# Patient Record
Sex: MALE | Race: WHITE | Employment: FULL TIME | URBAN - METROPOLITAN AREA
[De-identification: names, ages, dates, MRNs, and addresses within clinical notes are randomized per-mention and may not be internally consistent; named-entity substitution may affect disease eponyms.]

---

## 2017-04-20 ENCOUNTER — HOSPITAL ENCOUNTER (OUTPATIENT)
Age: 67
Discharge: HOME | End: 2017-04-20
Payer: MEDICARE

## 2017-04-20 LAB — PROSTATE SPECIFIC ANTIGEN: 1.63 UG/L

## 2019-01-10 DIAGNOSIS — M25.561 RIGHT KNEE PAIN, UNSPECIFIED CHRONICITY: Primary | ICD-10-CM

## 2019-01-11 ENCOUNTER — TELEPHONE (OUTPATIENT)
Dept: ORTHOPEDIC SURGERY | Age: 69
End: 2019-01-11

## 2019-01-14 ENCOUNTER — OFFICE VISIT (OUTPATIENT)
Dept: ORTHOPEDIC SURGERY | Age: 69
End: 2019-01-14
Payer: MEDICARE

## 2019-01-14 VITALS
RESPIRATION RATE: 20 BRPM | SYSTOLIC BLOOD PRESSURE: 141 MMHG | HEIGHT: 70 IN | WEIGHT: 225 LBS | DIASTOLIC BLOOD PRESSURE: 79 MMHG | BODY MASS INDEX: 32.21 KG/M2 | HEART RATE: 67 BPM

## 2019-01-14 DIAGNOSIS — M75.101 ROTATOR CUFF SYNDROME OF RIGHT SHOULDER: ICD-10-CM

## 2019-01-14 DIAGNOSIS — M17.0 BILATERAL PRIMARY OSTEOARTHRITIS OF KNEE: Primary | ICD-10-CM

## 2019-01-14 DIAGNOSIS — M25.561 PAIN IN BOTH KNEES, UNSPECIFIED CHRONICITY: ICD-10-CM

## 2019-01-14 DIAGNOSIS — M25.562 PAIN IN BOTH KNEES, UNSPECIFIED CHRONICITY: ICD-10-CM

## 2019-01-14 PROCEDURE — 4040F PNEUMOC VAC/ADMIN/RCVD: CPT | Performed by: ORTHOPAEDIC SURGERY

## 2019-01-14 PROCEDURE — 1123F ACP DISCUSS/DSCN MKR DOCD: CPT | Performed by: ORTHOPAEDIC SURGERY

## 2019-01-14 PROCEDURE — 1101F PT FALLS ASSESS-DOCD LE1/YR: CPT | Performed by: ORTHOPAEDIC SURGERY

## 2019-01-14 PROCEDURE — G8427 DOCREV CUR MEDS BY ELIG CLIN: HCPCS | Performed by: ORTHOPAEDIC SURGERY

## 2019-01-14 PROCEDURE — G8417 CALC BMI ABV UP PARAM F/U: HCPCS | Performed by: ORTHOPAEDIC SURGERY

## 2019-01-14 PROCEDURE — 1036F TOBACCO NON-USER: CPT | Performed by: ORTHOPAEDIC SURGERY

## 2019-01-14 PROCEDURE — G8484 FLU IMMUNIZE NO ADMIN: HCPCS | Performed by: ORTHOPAEDIC SURGERY

## 2019-01-14 PROCEDURE — 3017F COLORECTAL CA SCREEN DOC REV: CPT | Performed by: ORTHOPAEDIC SURGERY

## 2019-01-14 PROCEDURE — 99214 OFFICE O/P EST MOD 30 MIN: CPT | Performed by: ORTHOPAEDIC SURGERY

## 2019-01-14 ASSESSMENT — ENCOUNTER SYMPTOMS
SHORTNESS OF BREATH: 0
COLOR CHANGE: 0
DIARRHEA: 0
ABDOMINAL DISTENTION: 0
VOMITING: 0
CONSTIPATION: 0
ABDOMINAL PAIN: 0
NAUSEA: 0
CHEST TIGHTNESS: 0
COUGH: 0
APNEA: 0

## 2019-10-01 ENCOUNTER — OFFICE VISIT (OUTPATIENT)
Dept: UROLOGY | Age: 69
End: 2019-10-01
Payer: MEDICARE

## 2019-10-01 VITALS
HEIGHT: 70 IN | DIASTOLIC BLOOD PRESSURE: 60 MMHG | WEIGHT: 212.8 LBS | SYSTOLIC BLOOD PRESSURE: 110 MMHG | BODY MASS INDEX: 30.46 KG/M2 | TEMPERATURE: 98.1 F

## 2019-10-01 DIAGNOSIS — R10.9 FLANK PAIN: ICD-10-CM

## 2019-10-01 DIAGNOSIS — N20.1 URETERAL STONE: Primary | ICD-10-CM

## 2019-10-01 PROCEDURE — 1036F TOBACCO NON-USER: CPT | Performed by: UROLOGY

## 2019-10-01 PROCEDURE — G8427 DOCREV CUR MEDS BY ELIG CLIN: HCPCS | Performed by: UROLOGY

## 2019-10-01 PROCEDURE — 1123F ACP DISCUSS/DSCN MKR DOCD: CPT | Performed by: UROLOGY

## 2019-10-01 PROCEDURE — G8417 CALC BMI ABV UP PARAM F/U: HCPCS | Performed by: UROLOGY

## 2019-10-01 PROCEDURE — 99214 OFFICE O/P EST MOD 30 MIN: CPT | Performed by: UROLOGY

## 2019-10-01 PROCEDURE — 4040F PNEUMOC VAC/ADMIN/RCVD: CPT | Performed by: UROLOGY

## 2019-10-01 PROCEDURE — 3017F COLORECTAL CA SCREEN DOC REV: CPT | Performed by: UROLOGY

## 2019-10-01 PROCEDURE — G8484 FLU IMMUNIZE NO ADMIN: HCPCS | Performed by: UROLOGY

## 2019-10-01 RX ORDER — IBUPROFEN 800 MG/1
800 TABLET ORAL EVERY 6 HOURS PRN
COMMUNITY
End: 2019-12-26 | Stop reason: SDUPTHER

## 2019-10-01 RX ORDER — COVID-19 ANTIGEN TEST
KIT MISCELLANEOUS
COMMUNITY
End: 2019-10-22 | Stop reason: ALTCHOICE

## 2019-10-01 ASSESSMENT — ENCOUNTER SYMPTOMS
VOMITING: 0
COUGH: 0
EYE REDNESS: 0
BACK PAIN: 1
EYE PAIN: 0
ABDOMINAL PAIN: 0
SHORTNESS OF BREATH: 0
WHEEZING: 0
NAUSEA: 0
COLOR CHANGE: 0

## 2019-10-18 ENCOUNTER — HOSPITAL ENCOUNTER (OUTPATIENT)
Age: 69
Discharge: HOME | End: 2019-10-20
Payer: MEDICARE

## 2019-10-18 ENCOUNTER — HOSPITAL ENCOUNTER (OUTPATIENT)
Dept: GENERAL RADIOLOGY | Age: 69
Discharge: HOME | End: 2019-10-20
Payer: MEDICARE

## 2019-10-18 DIAGNOSIS — N20.1 URETERAL STONE: ICD-10-CM

## 2019-10-18 PROCEDURE — 74018 RADEX ABDOMEN 1 VIEW: CPT

## 2019-10-22 ENCOUNTER — OFFICE VISIT (OUTPATIENT)
Dept: UROLOGY | Age: 69
End: 2019-10-22
Payer: MEDICARE

## 2019-10-22 ENCOUNTER — TELEPHONE (OUTPATIENT)
Dept: UROLOGY | Age: 69
End: 2019-10-22

## 2019-10-22 VITALS
DIASTOLIC BLOOD PRESSURE: 70 MMHG | WEIGHT: 217 LBS | SYSTOLIC BLOOD PRESSURE: 120 MMHG | TEMPERATURE: 98 F | HEIGHT: 70 IN | BODY MASS INDEX: 31.07 KG/M2 | HEART RATE: 58 BPM

## 2019-10-22 DIAGNOSIS — N20.1 URETERAL STONE: ICD-10-CM

## 2019-10-22 DIAGNOSIS — N20.0 KIDNEY STONE: Primary | ICD-10-CM

## 2019-10-22 PROCEDURE — 4040F PNEUMOC VAC/ADMIN/RCVD: CPT | Performed by: UROLOGY

## 2019-10-22 PROCEDURE — G8417 CALC BMI ABV UP PARAM F/U: HCPCS | Performed by: UROLOGY

## 2019-10-22 PROCEDURE — 3017F COLORECTAL CA SCREEN DOC REV: CPT | Performed by: UROLOGY

## 2019-10-22 PROCEDURE — 1036F TOBACCO NON-USER: CPT | Performed by: UROLOGY

## 2019-10-22 PROCEDURE — G8484 FLU IMMUNIZE NO ADMIN: HCPCS | Performed by: UROLOGY

## 2019-10-22 PROCEDURE — 99214 OFFICE O/P EST MOD 30 MIN: CPT | Performed by: UROLOGY

## 2019-10-22 PROCEDURE — G8427 DOCREV CUR MEDS BY ELIG CLIN: HCPCS | Performed by: UROLOGY

## 2019-10-22 PROCEDURE — 1123F ACP DISCUSS/DSCN MKR DOCD: CPT | Performed by: UROLOGY

## 2019-10-22 RX ORDER — TRAMADOL HYDROCHLORIDE 50 MG/1
50 TABLET ORAL EVERY 8 HOURS PRN
Refills: 1 | COMMUNITY
Start: 2019-10-05 | End: 2022-02-15

## 2019-10-22 RX ORDER — CELECOXIB 200 MG/1
200 CAPSULE ORAL DAILY
Refills: 2 | COMMUNITY
Start: 2019-10-13 | End: 2020-03-10

## 2019-10-22 ASSESSMENT — ENCOUNTER SYMPTOMS
COUGH: 0
COLOR CHANGE: 0
VOMITING: 0
BACK PAIN: 0
EYE PAIN: 0
NAUSEA: 0
SHORTNESS OF BREATH: 0
ABDOMINAL PAIN: 0
EYE REDNESS: 0
WHEEZING: 0

## 2019-10-25 ENCOUNTER — HOSPITAL ENCOUNTER (OUTPATIENT)
Dept: PREADMISSION TESTING | Age: 69
Discharge: HOME | End: 2019-10-29
Payer: MEDICARE

## 2019-10-25 VITALS
DIASTOLIC BLOOD PRESSURE: 78 MMHG | OXYGEN SATURATION: 97 % | RESPIRATION RATE: 16 BRPM | TEMPERATURE: 97.9 F | HEART RATE: 56 BPM | HEIGHT: 70 IN | BODY MASS INDEX: 31.07 KG/M2 | WEIGHT: 217 LBS | SYSTOLIC BLOOD PRESSURE: 143 MMHG

## 2019-10-25 LAB
ABSOLUTE EOS #: 0 K/UL (ref 0–0.4)
ABSOLUTE IMMATURE GRANULOCYTE: ABNORMAL K/UL (ref 0–0.3)
ABSOLUTE LYMPH #: 21.41 K/UL (ref 1–4.8)
ABSOLUTE MONO #: 0.78 K/UL (ref 0.1–1.3)
ANION GAP SERPL CALCULATED.3IONS-SCNC: 13 MMOL/L (ref 9–17)
ATYPICAL LYMPHOCYTE ABSOLUTE COUNT: 0.52 K/UL
ATYPICAL LYMPHOCYTES: 2 %
BASOPHILS # BLD: 0 % (ref 0–2)
BASOPHILS ABSOLUTE: 0 K/UL (ref 0–0.2)
BUN BLDV-MCNC: 16 MG/DL (ref 8–23)
BUN/CREAT BLD: ABNORMAL (ref 9–20)
CALCIUM SERPL-MCNC: 9.4 MG/DL (ref 8.6–10.4)
CHLORIDE BLD-SCNC: 104 MMOL/L (ref 98–107)
CO2: 23 MMOL/L (ref 20–31)
CREAT SERPL-MCNC: 0.6 MG/DL (ref 0.7–1.2)
DIFFERENTIAL TYPE: ABNORMAL
EOSINOPHILS RELATIVE PERCENT: 0 % (ref 0–4)
GFR AFRICAN AMERICAN: >60 ML/MIN
GFR NON-AFRICAN AMERICAN: >60 ML/MIN
GFR SERPL CREATININE-BSD FRML MDRD: ABNORMAL ML/MIN/{1.73_M2}
GFR SERPL CREATININE-BSD FRML MDRD: ABNORMAL ML/MIN/{1.73_M2}
GLUCOSE BLD-MCNC: 89 MG/DL (ref 70–99)
HCT VFR BLD CALC: 41.9 % (ref 41–53)
HEMOGLOBIN: 13.8 G/DL (ref 13.5–17.5)
IMMATURE GRANULOCYTES: ABNORMAL %
LYMPHOCYTES # BLD: 82 % (ref 24–44)
MCH RBC QN AUTO: 30.8 PG (ref 26–34)
MCHC RBC AUTO-ENTMCNC: 33 G/DL (ref 31–37)
MCV RBC AUTO: 93.4 FL (ref 80–100)
MONOCYTES # BLD: 3 % (ref 1–7)
MORPHOLOGY: NORMAL
NRBC AUTOMATED: ABNORMAL PER 100 WBC
PDW BLD-RTO: 14 % (ref 11.5–14.9)
PLATELET # BLD: 170 K/UL (ref 150–450)
PLATELET ESTIMATE: ABNORMAL
PMV BLD AUTO: 9.2 FL (ref 6–12)
POTASSIUM SERPL-SCNC: 4.3 MMOL/L (ref 3.7–5.3)
RBC # BLD: 4.49 M/UL (ref 4.5–5.9)
RBC # BLD: ABNORMAL 10*6/UL
SEG NEUTROPHILS: 13 % (ref 36–66)
SEGMENTED NEUTROPHILS ABSOLUTE COUNT: 3.39 K/UL (ref 1.3–9.1)
SODIUM BLD-SCNC: 140 MMOL/L (ref 135–144)
WBC # BLD: 26.1 K/UL (ref 3.5–11)
WBC # BLD: ABNORMAL 10*3/UL

## 2019-10-25 PROCEDURE — 85025 COMPLETE CBC W/AUTO DIFF WBC: CPT

## 2019-10-25 PROCEDURE — 80048 BASIC METABOLIC PNL TOTAL CA: CPT

## 2019-10-25 PROCEDURE — 87086 URINE CULTURE/COLONY COUNT: CPT

## 2019-10-25 PROCEDURE — 93005 ELECTROCARDIOGRAM TRACING: CPT | Performed by: ANESTHESIOLOGY

## 2019-10-25 PROCEDURE — 36415 COLL VENOUS BLD VENIPUNCTURE: CPT

## 2019-10-25 RX ORDER — IRBESARTAN 150 MG/1
150 TABLET ORAL DAILY
COMMUNITY
End: 2022-08-22 | Stop reason: SDUPTHER

## 2019-10-26 LAB
CULTURE: NORMAL
EKG ATRIAL RATE: 55 BPM
EKG P AXIS: 34 DEGREES
EKG P-R INTERVAL: 116 MS
EKG Q-T INTERVAL: 440 MS
EKG QRS DURATION: 86 MS
EKG QTC CALCULATION (BAZETT): 420 MS
EKG R AXIS: 19 DEGREES
EKG T AXIS: 27 DEGREES
EKG VENTRICULAR RATE: 55 BPM
Lab: NORMAL
SPECIMEN DESCRIPTION: NORMAL

## 2019-10-26 PROCEDURE — 93010 ELECTROCARDIOGRAM REPORT: CPT | Performed by: INTERNAL MEDICINE

## 2019-10-28 LAB — PATHOLOGIST REVIEW: NORMAL

## 2019-11-20 ENCOUNTER — TELEPHONE (OUTPATIENT)
Dept: UROLOGY | Age: 69
End: 2019-11-20

## 2019-11-20 DIAGNOSIS — N20.0 KIDNEY STONE: Primary | ICD-10-CM

## 2019-12-03 ENCOUNTER — HOSPITAL ENCOUNTER (OUTPATIENT)
Dept: GENERAL RADIOLOGY | Age: 69
Discharge: HOME | End: 2019-12-05
Payer: MEDICARE

## 2019-12-03 ENCOUNTER — HOSPITAL ENCOUNTER (OUTPATIENT)
Age: 69
Discharge: HOME | End: 2019-12-05
Payer: MEDICARE

## 2019-12-03 DIAGNOSIS — N20.1 URETERAL STONE: ICD-10-CM

## 2019-12-03 PROCEDURE — 74018 RADEX ABDOMEN 1 VIEW: CPT

## 2019-12-10 ENCOUNTER — TELEPHONE (OUTPATIENT)
Dept: UROLOGY | Age: 69
End: 2019-12-10

## 2019-12-10 DIAGNOSIS — N20.0 KIDNEY STONE: Primary | ICD-10-CM

## 2019-12-23 ENCOUNTER — TELEPHONE (OUTPATIENT)
Dept: UROLOGY | Age: 69
End: 2019-12-23

## 2019-12-23 DIAGNOSIS — R10.9 FLANK PAIN: Primary | ICD-10-CM

## 2019-12-26 ENCOUNTER — OFFICE VISIT (OUTPATIENT)
Dept: UROLOGY | Age: 69
End: 2019-12-26

## 2019-12-26 VITALS
HEIGHT: 70 IN | SYSTOLIC BLOOD PRESSURE: 131 MMHG | DIASTOLIC BLOOD PRESSURE: 81 MMHG | BODY MASS INDEX: 31.14 KG/M2 | HEART RATE: 58 BPM

## 2019-12-26 DIAGNOSIS — R10.9 FLANK PAIN: ICD-10-CM

## 2019-12-26 DIAGNOSIS — N20.0 KIDNEY STONE: Primary | ICD-10-CM

## 2019-12-26 PROCEDURE — 99024 POSTOP FOLLOW-UP VISIT: CPT | Performed by: NURSE PRACTITIONER

## 2019-12-26 RX ORDER — IBUPROFEN 800 MG/1
800 TABLET ORAL EVERY 12 HOURS PRN
Qty: 14 TABLET | Refills: 0 | Status: SHIPPED | OUTPATIENT
Start: 2019-12-26 | End: 2022-02-15

## 2019-12-26 ASSESSMENT — ENCOUNTER SYMPTOMS
EYE REDNESS: 0
DIARRHEA: 0
COUGH: 0
VOMITING: 0
WHEEZING: 0
ABDOMINAL PAIN: 0
CONSTIPATION: 0
SHORTNESS OF BREATH: 0
EYE PAIN: 0
NAUSEA: 0
BACK PAIN: 0

## 2019-12-31 ENCOUNTER — HOSPITAL ENCOUNTER (OUTPATIENT)
Age: 69
Setting detail: SPECIMEN
Discharge: HOME | End: 2019-12-31
Payer: MEDICARE

## 2019-12-31 ENCOUNTER — OFFICE VISIT (OUTPATIENT)
Dept: UROLOGY | Age: 69
End: 2019-12-31

## 2019-12-31 VITALS
HEART RATE: 56 BPM | HEIGHT: 70 IN | SYSTOLIC BLOOD PRESSURE: 131 MMHG | WEIGHT: 216.93 LBS | DIASTOLIC BLOOD PRESSURE: 87 MMHG | BODY MASS INDEX: 31.06 KG/M2

## 2019-12-31 DIAGNOSIS — N20.0 KIDNEY STONES: Primary | ICD-10-CM

## 2019-12-31 PROCEDURE — 99024 POSTOP FOLLOW-UP VISIT: CPT | Performed by: UROLOGY

## 2020-01-03 LAB
STONE COMPOSITION: NORMAL
STONE DESCRIPTION: NORMAL
STONE MASS: 212 MG
STONE NUMBER: NORMAL
STONE SIZE: NORMAL MM

## 2020-02-26 ENCOUNTER — TELEPHONE (OUTPATIENT)
Dept: UROLOGY | Age: 70
End: 2020-02-26

## 2020-02-26 NOTE — TELEPHONE ENCOUNTER
Called Bo spoke to [place] who informed me results can take 4-6 business days to be finalized. Pt appointment will need to be rescheduled. Called pt left a message appointment being changed to 3/10/2020 at 9:40AM. Pt called to confirm new appointment date.

## 2020-03-10 ENCOUNTER — OFFICE VISIT (OUTPATIENT)
Dept: UROLOGY | Age: 70
End: 2020-03-10
Payer: MEDICARE

## 2020-03-10 VITALS
DIASTOLIC BLOOD PRESSURE: 64 MMHG | SYSTOLIC BLOOD PRESSURE: 118 MMHG | BODY MASS INDEX: 32.07 KG/M2 | HEIGHT: 70 IN | TEMPERATURE: 98 F | WEIGHT: 224 LBS

## 2020-03-10 PROCEDURE — 4040F PNEUMOC VAC/ADMIN/RCVD: CPT | Performed by: UROLOGY

## 2020-03-10 PROCEDURE — 3017F COLORECTAL CA SCREEN DOC REV: CPT | Performed by: UROLOGY

## 2020-03-10 PROCEDURE — G8427 DOCREV CUR MEDS BY ELIG CLIN: HCPCS | Performed by: UROLOGY

## 2020-03-10 PROCEDURE — 99213 OFFICE O/P EST LOW 20 MIN: CPT | Performed by: UROLOGY

## 2020-03-10 PROCEDURE — G8484 FLU IMMUNIZE NO ADMIN: HCPCS | Performed by: UROLOGY

## 2020-03-10 PROCEDURE — 1036F TOBACCO NON-USER: CPT | Performed by: UROLOGY

## 2020-03-10 PROCEDURE — G8417 CALC BMI ABV UP PARAM F/U: HCPCS | Performed by: UROLOGY

## 2020-03-10 PROCEDURE — 1123F ACP DISCUSS/DSCN MKR DOCD: CPT | Performed by: UROLOGY

## 2020-03-10 RX ORDER — GABAPENTIN 100 MG/1
100 CAPSULE ORAL 3 TIMES DAILY
COMMUNITY
End: 2022-08-25

## 2020-03-10 ASSESSMENT — ENCOUNTER SYMPTOMS
BACK PAIN: 0
NAUSEA: 0
VOMITING: 0
ABDOMINAL PAIN: 0
COUGH: 0
COLOR CHANGE: 0
EYE PAIN: 0
EYE REDNESS: 0
WHEEZING: 0
SHORTNESS OF BREATH: 0

## 2020-06-15 ENCOUNTER — TELEPHONE (OUTPATIENT)
Dept: UROLOGY | Age: 70
End: 2020-06-15

## 2020-06-15 ENCOUNTER — HOSPITAL ENCOUNTER (OUTPATIENT)
Dept: GENERAL RADIOLOGY | Age: 70
Discharge: HOME | End: 2020-06-17
Payer: MEDICARE

## 2020-06-15 ENCOUNTER — HOSPITAL ENCOUNTER (OUTPATIENT)
Age: 70
Discharge: HOME | End: 2020-06-17
Payer: MEDICARE

## 2020-06-15 PROCEDURE — 74018 RADEX ABDOMEN 1 VIEW: CPT

## 2020-06-23 ENCOUNTER — OFFICE VISIT (OUTPATIENT)
Dept: UROLOGY | Age: 70
End: 2020-06-23
Payer: MEDICARE

## 2020-06-23 VITALS — TEMPERATURE: 98.1 F

## 2020-06-23 PROCEDURE — G8427 DOCREV CUR MEDS BY ELIG CLIN: HCPCS | Performed by: UROLOGY

## 2020-06-23 PROCEDURE — G8417 CALC BMI ABV UP PARAM F/U: HCPCS | Performed by: UROLOGY

## 2020-06-23 PROCEDURE — 1123F ACP DISCUSS/DSCN MKR DOCD: CPT | Performed by: UROLOGY

## 2020-06-23 PROCEDURE — 3017F COLORECTAL CA SCREEN DOC REV: CPT | Performed by: UROLOGY

## 2020-06-23 PROCEDURE — 99214 OFFICE O/P EST MOD 30 MIN: CPT | Performed by: UROLOGY

## 2020-06-23 PROCEDURE — 4040F PNEUMOC VAC/ADMIN/RCVD: CPT | Performed by: UROLOGY

## 2020-06-23 PROCEDURE — 1036F TOBACCO NON-USER: CPT | Performed by: UROLOGY

## 2020-06-23 ASSESSMENT — ENCOUNTER SYMPTOMS
VOMITING: 0
BACK PAIN: 1
EYE REDNESS: 0
WHEEZING: 0
EYE PAIN: 0
COUGH: 0
NAUSEA: 0
SHORTNESS OF BREATH: 0
ABDOMINAL PAIN: 0
COLOR CHANGE: 0

## 2020-06-23 NOTE — PROGRESS NOTES
[facility]  [facility] UROLOGY SPECIALISTS - [address] [address]  Dept: [address] [facility] Urology Office Note - Established    Patient:  Srinath Fisher  YOB: 1950  Date: 6/23/2020    The patient is a 71 y.o. male who presents todayfor evaluation of the following problems:   Chief Complaint   Patient presents with    Nephrolithiasis     3 mo f/u with KUB        HPI  He is here in follow up for kidney stones. He has some mild right flank pain, but he does a lot of lifting in caring for his mother. He had an ESWL for a right ureteral stone, which went well. He had a KUB, which shows stable non obstructing stones on each side. The pain in his back does not radiate. Summary of old records: N/A    Additional History: N/A    Procedures Today: N/A    Urinalysis today:  No results found for this visit on 06/23/20. Last several PSA's:  Lab Results   Component Value Date    PSA 1.63 04/20/2017    PSA 1.90 03/15/2012     Last total testosterone:  No results found for: TESTOSTERONE    AUA Symptom Score (6/23/2020):                               Last BUN and creatinine:  Lab Results   Component Value Date    BUN 16 10/25/2019     Lab Results   Component Value Date    CREATININE 0.60 (L) 10/25/2019       Additional Lab/Culture results: stone was calcium oxalate. Imaging Reviewed during this Office Visit: KUB images show stone in the kidneys, which are stable.    (results were independently reviewed by physician and radiology report verified)    PAST MEDICAL, FAMILY AND SOCIAL HISTORY UPDATE:  Past Medical History:   Diagnosis Date    Arthritis     right shoulder, knee    BPH (benign prostatic hyperplasia)     Caffeine use     2 cups coffee/day    Chronic back pain     Chronic lymphocytic leukemia (HCC)     numbers are stable    Hyperlipidemia     Hypertension     Hypoglycemia     Kidney stones     Ocular myasthenia gravis ([facility] [address]     Sciatica is no height or weight on file to calculate BMI. Patient is a 71 y.o. male in no acute distress and alert and oriented to person, place and time. Physical Exam  Constitutional: Patient in no acute distress. Neuro: Alert and oriented to person, place and time. Psych: Mood normal, affect normal  Skin: No rash noted  Lungs: Respiratory effort is normal  Cardiovascular: Warm & Pink  Abdomen: Soft, non-tender, non-distended with no CVA,  No flank tenderness,  Or hepatosplenomegaly   Lymphatics: No palpablelymphadenopathy. Bladder non-tender and not distended. Musculoskeletal: Normal gait and station      Assessment and Plan      1. Kidney stone    2. Flank pain    3. Prostate cancer screening           Plan:          He is doing well  Discussed stone prevention  He said he had a normal PSA. Return in about 1 year (around 6/23/2021) for Labs. Prescriptions Ordered:  No orders of the defined types were placed in this encounter. Orders Placed:  Orders Placed This Encounter   Procedures    XR ABDOMEN (KUB) (SINGLE AP VIEW)     Standing Status:   Future     Standing Expiration Date:   6/23/2021    PSA Screening     Standing Status:   Future     Standing Expiration Date:   6/23/2021           Sherrill Church MD    Agree with the ROS entered by the MA.

## 2020-06-23 NOTE — PROGRESS NOTES
Review of Systems   Constitutional: Negative for activity change, chills and fever. Eyes: Negative for pain, redness and visual disturbance. Respiratory: Negative for cough, shortness of breath and wheezing. Cardiovascular: Negative for chest pain and leg swelling. Gastrointestinal: Negative for abdominal pain, nausea and vomiting. Genitourinary: Negative for difficulty urinating, discharge, dysuria, flank pain, frequency, hematuria, scrotal swelling and testicular pain. Musculoskeletal: Positive for back pain and myalgias. Negative for joint swelling. Skin: Negative for color change and rash. Neurological: Negative for dizziness, tremors and numbness. Hematological: Negative for adenopathy. Does not bruise/bleed easily.

## 2021-07-12 DIAGNOSIS — N20.0 KIDNEY STONES: ICD-10-CM

## 2021-07-12 DIAGNOSIS — Z12.5 PROSTATE CANCER SCREENING: Primary | ICD-10-CM

## 2021-07-14 ENCOUNTER — HOSPITAL ENCOUNTER (OUTPATIENT)
Age: 71
Discharge: HOME | End: 2021-07-16
Payer: MEDICARE

## 2021-07-14 ENCOUNTER — HOSPITAL ENCOUNTER (OUTPATIENT)
Age: 71
Discharge: HOME | End: 2021-07-14
Payer: MEDICARE

## 2021-07-14 ENCOUNTER — HOSPITAL ENCOUNTER (OUTPATIENT)
Dept: GENERAL RADIOLOGY | Age: 71
Discharge: HOME | End: 2021-07-16
Payer: MEDICARE

## 2021-07-14 DIAGNOSIS — N20.0 KIDNEY STONES: ICD-10-CM

## 2021-07-14 DIAGNOSIS — Z12.5 PROSTATE CANCER SCREENING: ICD-10-CM

## 2021-07-14 LAB — PROSTATE SPECIFIC ANTIGEN: 2.45 UG/L

## 2021-07-14 PROCEDURE — 36415 COLL VENOUS BLD VENIPUNCTURE: CPT

## 2021-07-14 PROCEDURE — 74018 RADEX ABDOMEN 1 VIEW: CPT

## 2021-07-14 PROCEDURE — G0103 PSA SCREENING: HCPCS

## 2021-07-16 ENCOUNTER — OFFICE VISIT (OUTPATIENT)
Dept: UROLOGY | Age: 71
End: 2021-07-16
Payer: MEDICARE

## 2021-07-16 VITALS
TEMPERATURE: 97 F | SYSTOLIC BLOOD PRESSURE: 117 MMHG | HEIGHT: 70 IN | WEIGHT: 186 LBS | HEART RATE: 57 BPM | DIASTOLIC BLOOD PRESSURE: 66 MMHG | BODY MASS INDEX: 26.63 KG/M2

## 2021-07-16 DIAGNOSIS — N40.1 BPH WITH OBSTRUCTION/LOWER URINARY TRACT SYMPTOMS: ICD-10-CM

## 2021-07-16 DIAGNOSIS — N20.0 KIDNEY STONES: Primary | ICD-10-CM

## 2021-07-16 DIAGNOSIS — Z12.5 PROSTATE CANCER SCREENING: ICD-10-CM

## 2021-07-16 DIAGNOSIS — N13.8 BPH WITH OBSTRUCTION/LOWER URINARY TRACT SYMPTOMS: ICD-10-CM

## 2021-07-16 PROCEDURE — 1123F ACP DISCUSS/DSCN MKR DOCD: CPT | Performed by: UROLOGY

## 2021-07-16 PROCEDURE — 3017F COLORECTAL CA SCREEN DOC REV: CPT | Performed by: UROLOGY

## 2021-07-16 PROCEDURE — G8417 CALC BMI ABV UP PARAM F/U: HCPCS | Performed by: UROLOGY

## 2021-07-16 PROCEDURE — 1036F TOBACCO NON-USER: CPT | Performed by: UROLOGY

## 2021-07-16 PROCEDURE — G8427 DOCREV CUR MEDS BY ELIG CLIN: HCPCS | Performed by: UROLOGY

## 2021-07-16 PROCEDURE — 99214 OFFICE O/P EST MOD 30 MIN: CPT | Performed by: UROLOGY

## 2021-07-16 PROCEDURE — 4040F PNEUMOC VAC/ADMIN/RCVD: CPT | Performed by: UROLOGY

## 2021-07-16 RX ORDER — TAMSULOSIN HYDROCHLORIDE 0.4 MG/1
0.4 CAPSULE ORAL NIGHTLY
Qty: 90 CAPSULE | Refills: 3 | Status: SHIPPED | OUTPATIENT
Start: 2021-07-16 | End: 2022-08-25

## 2021-07-16 RX ORDER — DUTASTERIDE 0.5 MG/1
0.5 CAPSULE, LIQUID FILLED ORAL DAILY
Qty: 90 CAPSULE | Refills: 3 | Status: SHIPPED | OUTPATIENT
Start: 2021-07-16 | End: 2022-07-17

## 2021-07-16 ASSESSMENT — ENCOUNTER SYMPTOMS
SHORTNESS OF BREATH: 0
EYE REDNESS: 0
COLOR CHANGE: 0
EYE PAIN: 0
WHEEZING: 0
ABDOMINAL PAIN: 0
COUGH: 0
NAUSEA: 0
VOMITING: 0
BACK PAIN: 0

## 2021-07-16 NOTE — PROGRESS NOTES
[address] [address]  Dept: [address] [facility] Urology Office Note - Established    Patient:  Keli Orosco  YOB: 1950  Date: 7/16/2021    The patient is a 79 y.o. male who presents todayfor evaluation of the following problems:   Chief Complaint   Patient presents with    1 Year Follow Up       HPI  He is here in follow up for stones. He ha not had any recent pain in his kidneys. He has cut back on salt. He drinks a lot for water. No hematuria or dysuria. PSA is normal. He is on Flomax, which is working well for him. He has a good stream and empties well. He is on Avodart as well. Summary of old records: N/A    Additional History: N/A    Procedures Today: N/A    Urinalysis today:  No results found for this visit on 07/16/21. Last several PSA's:  Lab Results   Component Value Date    PSA 2.45 07/14/2021    PSA 1.63 04/20/2017    PSA 1.90 03/15/2012     Last total testosterone:  No results found for: TESTOSTERONE    AUA Symptom Score (7/16/2021):   INCOMPLETE EMPTYING: How often have you had the sensation of not emptying your bladder?: Not at all  FREQUENCY: How often do you have to urinate less than every two hours?: Not at all  INTERMITTENCY: How often have you found you stopped and started again several times when you urinated?: Not at all  URGENCY: How often have you found it difficult to postpone urination?: Not at all  WEAK STREAM: How often have you had a weak urinary stream?: Not at all  STRAINING: How often have you had to strain to start  urination?: Not at all  NOCTURIA: How many times did you typically get up at night to uriniate?: 2 Times  TOTAL I-PSS SCORE[de-identified] 2  How would you feel if you were to spend the rest of your life with your urinary condition?: Delighted    Last BUN and creatinine:  Lab Results   Component Value Date    BUN 16 10/25/2019     Lab Results   Component Value Date CREATININE 0.60 (L) 10/25/2019       Additional Lab/Culture results: none    Imaging Reviewed during this Office Visit: KUB images reviewed, stable left renal stones noted. (results were independently reviewed by physician and radiology report verified)    PAST MEDICAL, FAMILY AND SOCIAL HISTORY UPDATE:  Past Medical History:   Diagnosis Date    Arthritis     right shoulder, knee    BPH (benign prostatic hyperplasia)     Caffeine use     2 cups coffee/day    Chronic back pain     Chronic lymphocytic leukemia (HCC)     numbers are stable    Hyperlipidemia     Hypertension     Hypoglycemia     Kidney stones     Ocular myasthenia gravis ([address]     Sciatica     lower back    Vitamin D deficiency     Wears glasses      Past Surgical History:   Procedure Laterality Date    COLONOSCOPY      2 times    KNEE ARTHROSCOPY Bilateral     meniscus    LITHOTRIPSY      3 times    TONSILLECTOMY AND ADENOIDECTOMY  1959     Family History   Problem Relation Age of Onset    Glaucoma Mother     Other Mother         Wyonia Saltness Dementia Father     Prostate Cancer Brother     Heart Attack Maternal Uncle      Outpatient Medications Marked as Taking for the 7/16/21 encounter (Office Visit) with Jennifer Vu MD   Medication Sig Dispense Refill    tamsulosin (FLOMAX) 0.4 MG capsule Take 1 capsule by mouth nightly 90 capsule 3    dutasteride (AVODART) 0.5 MG capsule Take 1 capsule by mouth daily 90 capsule 3    gabapentin (NEURONTIN) 100 MG capsule Take 100 mg by mouth 3 times daily.  ibuprofen (ADVIL;MOTRIN) 800 MG tablet Take 1 tablet by mouth every 12 hours as needed for Pain 14 tablet 0    irbesartan (AVAPRO) 150 MG tablet Take 150 mg by mouth daily      traMADol (ULTRAM) 50 MG tablet Take 50 mg by mouth every 8 hours as needed.    1    Cholecalciferol 2000 units TABS Take 2,000 Units by mouth daily D3      desloratadine-pseudoephedrine (CLARINEX-D 24 HOUR) 5-240 MG per tablet Take 1 tablet by mouth daily as needed       Glucosamine HCl--250 MG TABS Take 2 tablets by mouth daily Move Free      pyridostigmine (MESTINON) 60 MG tablet Take 60 mg by mouth 4 times daily       simvastatin (ZOCOR) 20 MG tablet Take 20 mg by mouth nightly. Ciprofloxacin  Social History     Tobacco Use   Smoking Status Never Smoker   Smokeless Tobacco Never Used   Tobacco Comment    smoked a pipe about 1 year in his 19's     (Ifpatient a smoker, smoking cessation counseling offered)    Social History     Substance and Sexual Activity   Alcohol Use Yes    Comment: 2-3 times a week       REVIEW OF SYSTEMS:  Review of Systems    Physical Exam:      Vitals:    07/16/21 1134   BP: 117/66   Pulse: 57   Temp: 97 °F (36.1 °C)     Body mass index is 26.69 kg/m². Patient is a 79 y.o. male in no acute distress and alert and oriented to person, place and time. Physical Exam  Constitutional: Patient in no acute distress. Neuro: Alert and oriented to person, place and time. Psych: Mood normal, affect normal  Lungs: Respiratory effort is normal  Cardiovascular: Warm & Pink  Abdomen: Soft, non-tender, non-distended with no CVA,  No flank tenderness,  Or hepatosplenomegaly   Lymphatics: No palpablelymphadenopathy. Bladder non-tender and not distended. Musculoskeletal: Normal gait and station  RICO normal.     Assessment and Plan      1. Kidney stones    2. Prostate cancer screening    3. BPH with obstruction/lower urinary tract symptoms           Plan:          He is doing well. Continue Flomax and Avodart  Stones are stable. F/U in 1 year with a PSA and RICO. Return in about 1 year (around 7/16/2022) for labs, KUB.     Prescriptions Ordered:  Orders Placed This Encounter   Medications    tamsulosin (FLOMAX) 0.4 MG capsule     Sig: Take 1 capsule by mouth nightly     Dispense:  90 capsule     Refill:  3    dutasteride (AVODART) 0.5 MG capsule     Sig: Take 1 capsule by mouth daily     Dispense:  90 capsule     Refill:  3 Orders Placed:  Orders Placed This Encounter   Procedures    XR ABDOMEN (KUB) (SINGLE AP VIEW)     Standing Status:   Future     Standing Expiration Date:   7/16/2022    PSA, Diagnostic     Standing Status:   Future     Standing Expiration Date:   7/16/2022           Renzo Zhang MD    Agree with the ROS entered by the MA.

## 2021-09-17 NOTE — LETTER
[facility]  [facility] UROLOGY SPECIALISTS - [address]  Dept: [phone]  Dept Fax: [phone]        3/10/20    Patient: Santhosh Osuna  YOB: 1950    Dear Tressa Peterson,    I had the pleasure of seeing one of your patients, EDDIE MCNALLY today in the office today. Below are the relevant portions of my assessment and plan of care. IMPRESSION:  1. Kidney stone        PLAN:  Will follow non obstructing stones with KUB in 3 months. discussed dietary changes   Return in about 3 months (around 6/10/2020). Prescriptions Ordered:  No orders of the defined types were placed in this encounter. Orders Placed:  No orders of the defined types were placed in this encounter. Thank you for allowing me to participate in the care of this patient. I will keep you updated on this patient's follow up and I look forward to serving you and your patients again in the future.         Emmanuelle Curtis MD Continue Regimen: clobetasol sol QHS PRN itch (rf sent) Detail Level: Detailed Render In Strict Bullet Format?: No

## 2022-02-15 PROBLEM — M54.50 CHRONIC LOW BACK PAIN: Status: ACTIVE | Noted: 2020-02-14

## 2022-02-15 PROBLEM — B02.9 HERPES ZOSTER: Status: ACTIVE | Noted: 2020-03-09

## 2022-02-15 PROBLEM — G89.29 CHRONIC LOW BACK PAIN: Status: ACTIVE | Noted: 2020-02-14

## 2022-07-13 DIAGNOSIS — N40.1 BPH WITH OBSTRUCTION/LOWER URINARY TRACT SYMPTOMS: ICD-10-CM

## 2022-07-13 DIAGNOSIS — N13.8 BPH WITH OBSTRUCTION/LOWER URINARY TRACT SYMPTOMS: ICD-10-CM

## 2022-07-17 RX ORDER — DUTASTERIDE 0.5 MG/1
CAPSULE, LIQUID FILLED ORAL
Qty: 90 CAPSULE | Refills: 3 | Status: SHIPPED | OUTPATIENT
Start: 2022-07-17

## 2022-07-21 ENCOUNTER — HOSPITAL ENCOUNTER (OUTPATIENT)
Age: 72
Discharge: HOME | End: 2022-07-23
Payer: MEDICARE

## 2022-07-21 ENCOUNTER — HOSPITAL ENCOUNTER (OUTPATIENT)
Age: 72
Discharge: HOME | End: 2022-07-21
Payer: MEDICARE

## 2022-07-21 ENCOUNTER — HOSPITAL ENCOUNTER (OUTPATIENT)
Dept: GENERAL RADIOLOGY | Age: 72
Discharge: HOME | End: 2022-07-23
Payer: MEDICARE

## 2022-07-21 DIAGNOSIS — Z12.5 PROSTATE CANCER SCREENING: ICD-10-CM

## 2022-07-21 DIAGNOSIS — Z12.5 PROSTATE CANCER SCREENING: Primary | ICD-10-CM

## 2022-07-21 DIAGNOSIS — N20.0 KIDNEY STONES: ICD-10-CM

## 2022-07-21 LAB — PROSTATE SPECIFIC ANTIGEN: 4.28 NG/ML

## 2022-07-21 PROCEDURE — 74018 RADEX ABDOMEN 1 VIEW: CPT

## 2022-07-21 PROCEDURE — G0103 PSA SCREENING: HCPCS

## 2022-07-21 PROCEDURE — 36415 COLL VENOUS BLD VENIPUNCTURE: CPT

## 2022-07-29 ENCOUNTER — OFFICE VISIT (OUTPATIENT)
Dept: UROLOGY | Age: 72
End: 2022-07-29
Payer: MEDICARE

## 2022-07-29 VITALS — TEMPERATURE: 97.6 F | BODY MASS INDEX: 26.77 KG/M2 | HEIGHT: 70 IN | WEIGHT: 187 LBS

## 2022-07-29 DIAGNOSIS — Z12.5 PROSTATE CANCER SCREENING: ICD-10-CM

## 2022-07-29 DIAGNOSIS — N20.0 KIDNEY STONES: Primary | ICD-10-CM

## 2022-07-29 DIAGNOSIS — N13.8 BPH WITH OBSTRUCTION/LOWER URINARY TRACT SYMPTOMS: ICD-10-CM

## 2022-07-29 DIAGNOSIS — R97.20 ELEVATED PSA: ICD-10-CM

## 2022-07-29 DIAGNOSIS — N40.1 BPH WITH OBSTRUCTION/LOWER URINARY TRACT SYMPTOMS: ICD-10-CM

## 2022-07-29 PROCEDURE — 1123F ACP DISCUSS/DSCN MKR DOCD: CPT | Performed by: UROLOGY

## 2022-07-29 PROCEDURE — 99214 OFFICE O/P EST MOD 30 MIN: CPT | Performed by: UROLOGY

## 2022-07-29 ASSESSMENT — ENCOUNTER SYMPTOMS
NAUSEA: 0
SHORTNESS OF BREATH: 0
GASTROINTESTINAL NEGATIVE: 1
ABDOMINAL PAIN: 0
CONSTIPATION: 0
EYE PAIN: 0
BACK PAIN: 0
COUGH: 0
EYES NEGATIVE: 1
EYE REDNESS: 0
WHEEZING: 0
DIARRHEA: 0
RESPIRATORY NEGATIVE: 1
VOMITING: 0

## 2022-07-29 NOTE — LETTER
[address] [address]  Dept: [phone]  Dept Fax: [phone]        7/29/22    Patient: Moira Cee  YOB: 1950    Dear Helen Deluca MD,    I had the pleasure of seeing one of your patients, EDDIE MCNALLY today in the office today. Below are the relevant portions of my assessment and plan of care. IMPRESSION:  1. Kidney stones    2. BPH with obstruction/lower urinary tract symptoms    3. Prostate cancer screening    4. Elevated PSA        PLAN:  He is voiding well. PSA has gone up, despite Avodart. Will obtain ExoDx. Ha small left renal stones. Needs KUB in 6 months. Return in about 4 weeks (around 8/26/2022). Prescriptions Ordered:  No orders of the defined types were placed in this encounter. Orders Placed:  Orders Placed This Encounter   Procedures    XR ABDOMEN (KUB) (SINGLE AP VIEW)     Standing Status:   Future     Standing Expiration Date:   7/29/2023          Thank you for allowing me to participate in the care of this patient. I will keep you updated on this patient's follow up and I look forward to serving you and your patients again in the future.         Memo Cruz MD

## 2022-07-29 NOTE — PROGRESS NOTES
[address] [address]  Dept: [address] [facility] Urology Office Note - Established    Patient:  Roberto Arita  YOB: 1950  Date: 7/29/2022    The patient is a 70 y.o. male who presents todayfor evaluation of the following problems:   Chief Complaint   Patient presents with    1 Year Follow Up     1 year f/u with KUB & Labs       HPI  He is here in follow up for stones. He has been doing very well. He has made dietary changes. He had a ureteral stone, which was treated with ESWL. KUB shows a left renal stone. He has lost over 60 pounds. His PSA is up to 4.28. He continues on Flomax and Avodart. Summary of old records: N/A    Additional History: N/A    Procedures Today: N/A    Urinalysis today:  No results found for this visit on 07/29/22. Last several PSA's:  Lab Results   Component Value Date    PSA 4.28 (H) 07/21/2022    PSA 2.45 07/14/2021    PSA 1.63 04/20/2017     Last total testosterone:  No results found for: TESTOSTERONE    AUA Symptom Score (7/29/2022):                                Last BUN and creatinine:  Lab Results   Component Value Date    BUN 16 10/25/2019     Lab Results   Component Value Date    CREATININE 0.60 (L) 10/25/2019       Additional Lab/Culture results: none    Imaging Reviewed during this Office Visit: none  (results were independently reviewed by physician and radiology report verified)    PAST MEDICAL, FAMILY AND SOCIAL HISTORY UPDATE:  Past Medical History:   Diagnosis Date    Arthritis     right shoulder, knee    BPH (benign prostatic hyperplasia)     Caffeine use     2 cups coffee/day    Chronic back pain     Chronic lymphocytic leukemia ([address]     numbers are stable    Hyperlipidemia     Hypertension     Hypoglycemia     Kidney stones     Ocular myasthenia gravis ([address]     Sciatica     lower back    Vitamin D deficiency     Wears glasses Past Surgical History:   Procedure Laterality Date    COLONOSCOPY      2 times    KNEE ARTHROSCOPY Bilateral     meniscus    LITHOTRIPSY      3 times    TONSILLECTOMY AND ADENOIDECTOMY  1959     Family History   Problem Relation Age of Onset    Glaucoma Mother     Other Mother         tia    Dementia Father     Prostate Cancer Brother     Heart Attack Maternal Uncle      Outpatient Medications Marked as Taking for the 7/29/22 encounter (Office Visit) with Pipe Mclean MD   Medication Sig Dispense Refill    dutasteride (AVODART) 0.5 MG capsule take 1 capsule by mouth once daily 90 capsule 3    simvastatin (ZOCOR) 20 MG tablet Take 1 tablet by mouth nightly 90 tablet 3    CLARITIN-D 24 HOUR  MG per extended release tablet Take 1 tablet by mouth daily 301 tablet 11    tamsulosin (FLOMAX) 0.4 MG capsule Take 1 capsule by mouth nightly 90 capsule 3    gabapentin (NEURONTIN) 100 MG capsule Take 100 mg by mouth 3 times daily. irbesartan (AVAPRO) 150 MG tablet Take 150 mg by mouth daily      Cholecalciferol 2000 units TABS Take 2,000 Units by mouth daily D3      desloratadine-pseudoephedrine (CLARINEX-D 24-HOUR) 5-240 MG per extended release tablet Take 1 tablet by mouth daily as needed       Glucosamine HCl--250 MG TABS Take 2 tablets by mouth daily Move Free      pyridostigmine (MESTINON) 60 MG tablet Take 60 mg by mouth 4 times daily          Ciprofloxacin  Social History     Tobacco Use   Smoking Status Never   Smokeless Tobacco Never   Tobacco Comments    smoked a pipe about 1 year in his 19's     (Ifpatient a smoker, smoking cessation counseling offered)    Social History     Substance and Sexual Activity   Alcohol Use Yes    Comment: 2-3 times a week       REVIEW OF SYSTEMS:  Review of Systems    Physical Exam:      Vitals:    07/29/22 1255   Temp: 97.6 °F (36.4 °C)     Body mass index is 26.83 kg/m².   Patient is a 70 y.o. male in no acute distress and alert and oriented to person, place and time. Physical Exam  Constitutional: Patient in no acute distress. Neuro: Alert and oriented to person, place and time. Psych: Mood normal, affect normal  Lungs: Respiratory effort is normal  Cardiovascular: Warm & Pink  Abdomen: Soft, non-tender, non-distended with no CVA,  No flank tenderness,  Or hepatosplenomegaly   Lymphatics: No palpablelymphadenopathy. Bladder non-tender and not distended. Musculoskeletal: Normal gait and station  Testiscles: Normal, bilaterally  Prostate: deferred. Assessment and Plan      1. Kidney stones    2. BPH with obstruction/lower urinary tract symptoms    3. Prostate cancer screening    4. Elevated PSA           Plan:         He is voiding well. PSA has gone up, despite Avodart. Will obtain ExoDx. Ha small left renal stones. Needs KUB in 6 months. Return in about 4 weeks (around 8/26/2022). Prescriptions Ordered:  No orders of the defined types were placed in this encounter. Orders Placed:  Orders Placed This Encounter   Procedures    XR ABDOMEN (KUB) (SINGLE AP VIEW)     Standing Status:   Future     Standing Expiration Date:   7/29/2023             Adeline Child MD    Agree with the ROS entered by the MA.

## 2022-08-22 DIAGNOSIS — N40.1 BPH WITH OBSTRUCTION/LOWER URINARY TRACT SYMPTOMS: ICD-10-CM

## 2022-08-22 DIAGNOSIS — N13.8 BPH WITH OBSTRUCTION/LOWER URINARY TRACT SYMPTOMS: ICD-10-CM

## 2022-08-22 RX ORDER — TAMSULOSIN HYDROCHLORIDE 0.4 MG/1
0.4 CAPSULE ORAL DAILY
Qty: 30 CAPSULE | Refills: 5 | Status: SHIPPED | OUTPATIENT
Start: 2022-08-22 | End: 2022-08-25 | Stop reason: SDUPTHER

## 2022-08-22 NOTE — TELEPHONE ENCOUNTER
Patient requesting a refill stating that he does not have enough pills to last him the week. Patient states that he and Dr. Stacy Barnes discussed taking Flomax Twice daily, so he will need a new prescription sent to [facility] on [place].

## 2022-08-23 RX ORDER — TAMSULOSIN HYDROCHLORIDE 0.4 MG/1
0.4 CAPSULE ORAL NIGHTLY
Qty: 90 CAPSULE | Refills: 3 | Status: CANCELLED | OUTPATIENT
Start: 2022-08-23

## 2022-08-25 PROBLEM — M51.369 DEGENERATION OF LUMBAR INTERVERTEBRAL DISC: Status: ACTIVE | Noted: 2022-08-25

## 2022-08-25 PROBLEM — G56.20 LESION OF ULNAR NERVE: Status: ACTIVE | Noted: 2022-08-25

## 2022-08-25 PROBLEM — M51.36 DEGENERATION OF LUMBAR INTERVERTEBRAL DISC: Status: ACTIVE | Noted: 2022-08-25

## 2022-08-25 PROBLEM — M17.9 OSTEOARTHRITIS OF KNEE: Status: ACTIVE | Noted: 2022-08-25

## 2022-08-25 PROBLEM — H25.13 NUCLEAR SCLEROSIS OF BOTH EYES: Status: ACTIVE | Noted: 2022-08-25

## 2022-08-25 PROBLEM — G70.00 MYASTHENIA GRAVIS (HCC): Status: ACTIVE | Noted: 2022-08-25

## 2022-08-25 PROBLEM — N40.1 LOWER URINARY TRACT SYMPTOMS DUE TO BENIGN PROSTATIC HYPERPLASIA: Status: ACTIVE | Noted: 2022-08-25

## 2022-08-25 PROBLEM — M46.1 SACROILIITIS, NOT ELSEWHERE CLASSIFIED (HCC): Status: ACTIVE | Noted: 2022-08-25

## 2022-08-25 PROBLEM — S83.281A TEAR OF LATERAL MENISCUS OF RIGHT KNEE, CURRENT: Status: ACTIVE | Noted: 2022-08-25

## 2022-08-25 PROBLEM — E78.5 HYPERLIPIDEMIA: Status: ACTIVE | Noted: 2022-08-25

## 2022-08-25 PROBLEM — H40.003 PREGLAUCOMA, UNSPECIFIED, BILATERAL: Status: ACTIVE | Noted: 2022-08-25

## 2022-08-25 PROBLEM — H25.10 NUCLEAR SENILE CATARACT: Status: ACTIVE | Noted: 2022-08-25

## 2022-08-25 PROBLEM — I10 ESSENTIAL HYPERTENSION: Status: ACTIVE | Noted: 2022-08-25

## 2022-08-25 PROBLEM — M19.019 LOCALIZED, PRIMARY OSTEOARTHRITIS OF SHOULDER REGION: Status: ACTIVE | Noted: 2022-08-25

## 2022-08-25 PROBLEM — R73.9 HYPERGLYCEMIA: Status: ACTIVE | Noted: 2022-08-25

## 2022-08-25 PROBLEM — H02.831 DERMATOCHALASIS OF RIGHT UPPER EYELID: Status: ACTIVE | Noted: 2022-08-25

## 2022-08-25 PROBLEM — M47.817 LUMBOSACRAL SPONDYLOSIS WITHOUT MYELOPATHY: Status: ACTIVE | Noted: 2022-08-25

## 2022-08-25 PROBLEM — M43.10 ACQUIRED SPONDYLOLISTHESIS: Status: ACTIVE | Noted: 2022-08-25

## 2022-08-25 PROBLEM — M75.101 RUPTURE OF RIGHT ROTATOR CUFF: Status: ACTIVE | Noted: 2022-08-25

## 2022-08-25 PROBLEM — H02.834 DERMATOCHALASIS OF LEFT UPPER EYELID: Status: ACTIVE | Noted: 2022-08-25

## 2022-08-25 PROBLEM — M76.899 PES ANSERINUS TENDINITIS: Status: ACTIVE | Noted: 2022-07-11

## 2022-08-30 ENCOUNTER — OFFICE VISIT (OUTPATIENT)
Dept: UROLOGY | Age: 72
End: 2022-08-30
Payer: MEDICARE

## 2022-08-30 ENCOUNTER — TELEPHONE (OUTPATIENT)
Dept: UROLOGY | Age: 72
End: 2022-08-30

## 2022-08-30 VITALS
BODY MASS INDEX: 24.91 KG/M2 | DIASTOLIC BLOOD PRESSURE: 71 MMHG | WEIGHT: 174 LBS | HEART RATE: 60 BPM | RESPIRATION RATE: 16 BRPM | SYSTOLIC BLOOD PRESSURE: 135 MMHG | TEMPERATURE: 97.8 F | HEIGHT: 70 IN

## 2022-08-30 DIAGNOSIS — Z87.898 HISTORY OF ELEVATED PSA: ICD-10-CM

## 2022-08-30 DIAGNOSIS — N13.8 BPH WITH OBSTRUCTION/LOWER URINARY TRACT SYMPTOMS: Primary | ICD-10-CM

## 2022-08-30 DIAGNOSIS — N40.1 BPH WITH OBSTRUCTION/LOWER URINARY TRACT SYMPTOMS: Primary | ICD-10-CM

## 2022-08-30 PROCEDURE — G8427 DOCREV CUR MEDS BY ELIG CLIN: HCPCS | Performed by: UROLOGY

## 2022-08-30 PROCEDURE — 1123F ACP DISCUSS/DSCN MKR DOCD: CPT | Performed by: UROLOGY

## 2022-08-30 PROCEDURE — 99213 OFFICE O/P EST LOW 20 MIN: CPT | Performed by: UROLOGY

## 2022-08-30 PROCEDURE — G8420 CALC BMI NORM PARAMETERS: HCPCS | Performed by: UROLOGY

## 2022-08-30 PROCEDURE — 1036F TOBACCO NON-USER: CPT | Performed by: UROLOGY

## 2022-08-30 PROCEDURE — 3017F COLORECTAL CA SCREEN DOC REV: CPT | Performed by: UROLOGY

## 2022-08-30 RX ORDER — TAMSULOSIN HYDROCHLORIDE 0.4 MG/1
0.4 CAPSULE ORAL 2 TIMES DAILY
Qty: 180 CAPSULE | Refills: 5 | Status: SHIPPED | OUTPATIENT
Start: 2022-08-30

## 2022-08-30 ASSESSMENT — ENCOUNTER SYMPTOMS
DIARRHEA: 0
COUGH: 0
BACK PAIN: 0
EYE PAIN: 0
NAUSEA: 0
ABDOMINAL PAIN: 0
WHEEZING: 0
VOMITING: 0
CONSTIPATION: 0
SHORTNESS OF BREATH: 0

## 2022-08-30 NOTE — PROGRESS NOTES
[address] [address]  Dept: [address] [facility] Urology Office Note - Established    Patient:  Rosemarie Acuna  YOB: 1950  Date: 8/30/2022    The patient is a 70 y.o. male who presents todayfor evaluation of the following problems:   Chief Complaint   Patient presents with    Results     4 weeks w/ exo dx       HPI  He is here in follow up for elevated PSA. He had an ExoDx, which came back at 59. His PSA has been going up on Avodart. He is voiding fine. He is on Flomax BID as well. Summary of old records: N/A    Additional History: N/A    Procedures Today: N/A    Urinalysis today:  No results found for this visit on 08/30/22. Last several PSA's:  Lab Results   Component Value Date    PSA 3.78 08/08/2022    PSA 4.28 (H) 07/21/2022    PSA 2.45 07/14/2021     Last total testosterone:  No results found for: TESTOSTERONE    AUA Symptom Score (8/30/2022):   INCOMPLETE EMPTYING: How often have you had the sensation of not emptying your bladder?: Not at all  FREQUENCY: How often do you have to urinate less than every two hours?: Not at all  INTERMITTENCY: How often have you found you stopped and started again several times when you urinated?: Not at all  URGENCY: How often have you found it difficult to postpone urination?: Not at all  WEAK STREAM: How often have you had a weak urinary stream?: Not at all  STRAINING: How often have you had to strain to start  urination?: Not at all  NOCTURIA: How many times did you typically get up at night to uriniate?: NONE  TOTAL I-PSS SCORE[de-identified] 0       Last BUN and creatinine:  Lab Results   Component Value Date    BUN 16 10/25/2019     Lab Results   Component Value Date    CREATININE 0.60 (L) 10/25/2019       Additional Lab/Culture results: none    Imaging Reviewed during this Office Visit: none  (results were independently reviewed by physician and a smoker, smoking cessation counseling offered)    Social History     Substance and Sexual Activity   Alcohol Use Yes    Comment: 2-3 times a week       REVIEW OF SYSTEMS:  Review of Systems    Physical Exam:      Vitals:    08/30/22 1004   BP: 135/71   Pulse: 60   Resp: 16   Temp: 97.8 °F (36.6 °C)     Body mass index is 24.97 kg/m². Patient is a 70 y.o. male in no acute distress and alert and oriented to person, place and time. Physical Exam  Constitutional: Patient in no acute distress. Neuro: Alert and oriented to person, place and time. Psych: Mood normal, affect normal  Lungs: Respiratory effort is normal  Cardiovascular: Warm & Pink  Abdomen: Soft, non-tender, non-distended with no CVA,  No flank tenderness,  Or hepatosplenomegaly   Lymphatics: No palpablelymphadenopathy. Bladder non-tender and not distended. Assessment and Plan      1. BPH with obstruction/lower urinary tract symptoms    2. History of elevated PSA           Plan:         PSA was elevated. ExoDx was elevated as well. Will move forward with prostate MRI. Return in about 6 weeks (around 10/11/2022). Prescriptions Ordered:  No orders of the defined types were placed in this encounter. Orders Placed:  No orders of the defined types were placed in this encounter. Sergio Rausch MD    Agree with the ROS entered by the MA.

## 2022-08-30 NOTE — LETTER
[address] [address]  Dept: [phone]  Dept Fax: [phone]        8/30/22    Patient: Eddi Hall  YOB: 1950    Dear Veto Cheng MD,    I had the pleasure of seeing one of your patients, EDDIE MCNALLY today in the office today. Below are the relevant portions of my assessment and plan of care. IMPRESSION:  1. BPH with obstruction/lower urinary tract symptoms    2. History of elevated PSA        PLAN:  PSA was elevated. ExoDx was elevated as well. Will move forward with prostate MRI. Return in about 6 weeks (around 10/11/2022). Prescriptions Ordered:  No orders of the defined types were placed in this encounter. Orders Placed:  No orders of the defined types were placed in this encounter. Thank you for allowing me to participate in the care of this patient. I will keep you updated on this patient's follow up and I look forward to serving you and your patients again in the future.         Indy Murillo MD

## 2022-09-20 ENCOUNTER — HOSPITAL ENCOUNTER (OUTPATIENT)
Dept: MRI IMAGING | Age: 72
Discharge: HOME | End: 2022-09-22
Payer: MEDICARE

## 2022-09-20 DIAGNOSIS — Z87.898 HISTORY OF ELEVATED PSA: ICD-10-CM

## 2022-09-20 LAB
GFR NON-AFRICAN AMERICAN: >60 ML/MIN
GFR SERPL CREATININE-BSD FRML MDRD: >60 ML/MIN
GFR SERPL CREATININE-BSD FRML MDRD: NORMAL ML/MIN/{1.73_M2}
POC CREATININE: 0.52 MG/DL (ref 0.51–1.19)

## 2022-09-20 PROCEDURE — 82565 ASSAY OF CREATININE: CPT

## 2022-09-20 PROCEDURE — 72197 MRI PELVIS W/O & W/DYE: CPT

## 2022-09-20 PROCEDURE — 2580000003 HC RX 258: Performed by: UROLOGY

## 2022-09-20 PROCEDURE — A9579 GAD-BASE MR CONTRAST NOS,1ML: HCPCS | Performed by: UROLOGY

## 2022-09-20 PROCEDURE — 6360000004 HC RX CONTRAST MEDICATION: Performed by: UROLOGY

## 2022-09-20 RX ORDER — SODIUM CHLORIDE 0.9 % (FLUSH) 0.9 %
10 SYRINGE (ML) INJECTION PRN
Status: DISCONTINUED | OUTPATIENT
Start: 2022-09-20 | End: 2022-09-23 | Stop reason: HOSPADM

## 2022-09-20 RX ORDER — 0.9 % SODIUM CHLORIDE 0.9 %
40 INTRAVENOUS SOLUTION INTRAVENOUS ONCE
Status: COMPLETED | OUTPATIENT
Start: 2022-09-20 | End: 2022-09-20

## 2022-09-20 RX ADMIN — SODIUM CHLORIDE, PRESERVATIVE FREE 10 ML: 5 INJECTION INTRAVENOUS at 12:28

## 2022-09-20 RX ADMIN — GADOTERIDOL 17 ML: 279.3 INJECTION, SOLUTION INTRAVENOUS at 12:28

## 2022-09-20 RX ADMIN — SODIUM CHLORIDE 40 ML: 9 INJECTION, SOLUTION INTRAVENOUS at 12:27

## 2022-09-26 ENCOUNTER — TELEPHONE (OUTPATIENT)
Dept: UROLOGY | Age: 72
End: 2022-09-26

## 2022-09-26 NOTE — TELEPHONE ENCOUNTER
Patient left a voicemail on nurse line wanting to know his results of his MRI. \"I would like to know if I have any lesions or if surgery is needed. \"     Obie Blackwood will speak with NP Geannie Ahumada since Tamela Clement is out of the office until 9/30/22

## 2022-09-27 NOTE — TELEPHONE ENCOUNTER
I spoke with patient regarding MRI results, he will keep f/u appt 10/11/22 with Dr. Eli Thompson to discuss further management of his elevated PSA. Patient and I also discussed that there is free fluid within the pelvis which recommends folllow up with CT abd/pelvis with contrast.  Patient was encouraged to call PCP to discuss follow up regarding this. We will also fax report to PCP.

## 2022-10-04 PROBLEM — G70.00 MYASTHENIA GRAVIS (HCC): Status: RESOLVED | Noted: 2022-08-25 | Resolved: 2022-10-04

## 2022-10-04 PROBLEM — Z86.69 HISTORY OF MYASTHENIA GRAVIS: Status: ACTIVE | Noted: 2022-10-04

## 2022-10-04 PROBLEM — M46.1 SACROILIITIS, NOT ELSEWHERE CLASSIFIED (HCC): Status: RESOLVED | Noted: 2022-08-25 | Resolved: 2022-10-04

## 2022-10-11 ENCOUNTER — OFFICE VISIT (OUTPATIENT)
Dept: UROLOGY | Age: 72
End: 2022-10-11
Payer: MEDICARE

## 2022-10-11 ENCOUNTER — HOSPITAL ENCOUNTER (OUTPATIENT)
Age: 72
Setting detail: SPECIMEN
Discharge: HOME | End: 2022-10-11

## 2022-10-11 VITALS — WEIGHT: 175 LBS | BODY MASS INDEX: 25.05 KG/M2 | HEIGHT: 70 IN

## 2022-10-11 DIAGNOSIS — Z87.898 HISTORY OF ELEVATED PSA: Primary | ICD-10-CM

## 2022-10-11 DIAGNOSIS — Z87.898 HISTORY OF ELEVATED PSA: ICD-10-CM

## 2022-10-11 DIAGNOSIS — N13.8 BPH WITH OBSTRUCTION/LOWER URINARY TRACT SYMPTOMS: ICD-10-CM

## 2022-10-11 DIAGNOSIS — N40.1 BPH WITH OBSTRUCTION/LOWER URINARY TRACT SYMPTOMS: ICD-10-CM

## 2022-10-11 PROCEDURE — G8484 FLU IMMUNIZE NO ADMIN: HCPCS | Performed by: UROLOGY

## 2022-10-11 PROCEDURE — 3017F COLORECTAL CA SCREEN DOC REV: CPT | Performed by: UROLOGY

## 2022-10-11 PROCEDURE — G8417 CALC BMI ABV UP PARAM F/U: HCPCS | Performed by: UROLOGY

## 2022-10-11 PROCEDURE — G8427 DOCREV CUR MEDS BY ELIG CLIN: HCPCS | Performed by: UROLOGY

## 2022-10-11 PROCEDURE — 1123F ACP DISCUSS/DSCN MKR DOCD: CPT | Performed by: UROLOGY

## 2022-10-11 PROCEDURE — 99214 OFFICE O/P EST MOD 30 MIN: CPT | Performed by: UROLOGY

## 2022-10-11 PROCEDURE — 1036F TOBACCO NON-USER: CPT | Performed by: UROLOGY

## 2022-10-11 ASSESSMENT — ENCOUNTER SYMPTOMS
GASTROINTESTINAL NEGATIVE: 1
RESPIRATORY NEGATIVE: 1
CONSTIPATION: 0
WHEEZING: 0
EYE PAIN: 0
ABDOMINAL PAIN: 0
EYES NEGATIVE: 1
VOMITING: 0
SHORTNESS OF BREATH: 0
DIARRHEA: 0
BACK PAIN: 0
COUGH: 0
NAUSEA: 0
EYE REDNESS: 0

## 2022-10-11 NOTE — PROGRESS NOTES
Review of Systems   Constitutional: Negative. Negative for appetite change, chills and fatigue. Eyes: Negative. Negative for pain, redness and visual disturbance. Respiratory: Negative. Negative for cough, shortness of breath and wheezing. Cardiovascular: Negative. Negative for chest pain and leg swelling. Gastrointestinal: Negative. Negative for abdominal pain, constipation, diarrhea, nausea and vomiting. Genitourinary:  Positive for difficulty urinating. Negative for dysuria, flank pain, frequency, hematuria and urgency. Musculoskeletal: Negative. Negative for back pain, joint swelling and myalgias. Skin: Negative. Negative for rash and wound. Neurological: Negative. Negative for dizziness, weakness and numbness. Hematological: Negative. Does not bruise/bleed easily.

## 2022-10-11 NOTE — PROGRESS NOTES
[address] [address]  Dept: [address] [facility] Urology Office Note - Established    Patient:  Jessica Krishnan  YOB: 1950  Date: 10/11/2022    The patient is a 67 y.o. male who presents todayfor evaluation of the following problems:   Chief Complaint   Patient presents with    Results     6 weeks with MRI       HPI  He is here in follow up for elevated PSA. He is on Avodart, and his PSA was going up. ExoDx was elevated at 64. MRI is PI RADS 2. He is on an OTC testosterone supplement. He is on Flomax BID. He does have some issues starting his stream.       Summary of old records: N/A    Additional History: N/A    Procedures Today: N/A    Urinalysis today:  No results found for this visit on 10/11/22. Last several PSA's:  Lab Results   Component Value Date    PSA 3.78 08/08/2022    PSA 4.28 (H) 07/21/2022    PSA 2.45 07/14/2021     Last total testosterone:  No results found for: TESTOSTERONE    AUA Symptom Score (10/11/2022): Last BUN and creatinine:  Lab Results   Component Value Date    BUN 16 10/25/2019     Lab Results   Component Value Date    CREATININE 0.52 09/20/2022       Additional Lab/Culture results: none    Imaging Reviewed during this Office Visit: MRI images reviewed, PI RADS 2. Lesion in bladder is median lobe.    (results were independently reviewed by physician and radiology report verified)    PAST MEDICAL, FAMILY AND SOCIAL HISTORY UPDATE:  Past Medical History:   Diagnosis Date    Arthritis     right shoulder, knee    BPH (benign prostatic hyperplasia)     Caffeine use     2 cups coffee/day    Chronic back pain     Chronic lymphocytic leukemia ([address]     numbers are stable    History of myasthenia gravis     Hyperlipidemia     Hypertension     Hypoglycemia     Kidney stones     Ocular myasthenia gravis ([address]     Sciatica     lower back    Vitamin D deficiency     Wears glasses      Past Surgical History:   Procedure Laterality Date    COLONOSCOPY      2 times    KNEE ARTHROSCOPY Bilateral     meniscus    LITHOTRIPSY      3 times    TONSILLECTOMY AND ADENOIDECTOMY  1959     Family History   Problem Relation Age of Onset    Glaucoma Mother     Other Mother         tia    Dementia Father     Prostate Cancer Brother     Heart Attack Maternal Uncle      Outpatient Medications Marked as Taking for the 10/11/22 encounter (Office Visit) with Whitney Christianson MD   Medication Sig Dispense Refill    tamsulosin (FLOMAX) 0.4 MG capsule Take 1 capsule by mouth in the morning and at bedtime 180 capsule 5    irbesartan (AVAPRO) 150 MG tablet Take 1 tablet by mouth daily 30 tablet 11    meloxicam (MOBIC) 15 MG tablet Take 1 tablet by mouth daily 30 tablet 11    dutasteride (AVODART) 0.5 MG capsule take 1 capsule by mouth once daily 90 capsule 3    simvastatin (ZOCOR) 20 MG tablet Take 1 tablet by mouth nightly 90 tablet 3    CLARITIN-D 24 HOUR  MG per extended release tablet Take 1 tablet by mouth daily 301 tablet 11    Cholecalciferol 2000 units TABS Take 2,000 Units by mouth daily D3      Glucosamine HCl--250 MG TABS Take 2 tablets by mouth daily Move Free      pyridostigmine (MESTINON) 60 MG tablet Take 60 mg by mouth 4 times daily          Ciprofloxacin  Social History     Tobacco Use   Smoking Status Never   Smokeless Tobacco Never   Tobacco Comments    smoked a pipe about 1 year in his 19's     (Ifpatient a smoker, smoking cessation counseling offered)    Social History     Substance and Sexual Activity   Alcohol Use Yes    Comment: 2-3 times a week       REVIEW OF SYSTEMS:  Review of Systems    Physical Exam:    There were no vitals filed for this visit. Body mass index is 25.11 kg/m². Patient is a 67 y.o. male in no acute distress and alert and oriented to person, place and time.   Physical Exam  Constitutional: Patient in no acute distress. Neuro: Alert and oriented to person, place and time. Lungs: Respiratory effort is normal  Cardiovascular: Warm & Pink  Abdomen: Soft, non-tender, non-distended with no CVA,  No flank tenderness,  Or hepatosplenomegaly   Lymphatics: No palpablelymphadenopathy. Bladder non-tender and not distended. Assessment and Plan      1. BPH with obstruction/lower urinary tract symptoms    2. History of elevated PSA           Plan:         MRI is negative. EXo DX is elevated at 64. He has voiding complaints despite Avodart and Flomax BID. Will move forward with cysto and prostate biopsy. Will move forward under MAC. Return in about 4 weeks (around 11/8/2022) for surgery. Prescriptions Ordered:  No orders of the defined types were placed in this encounter. Orders Placed:  Orders Placed This Encounter   Procedures    PSA, Diagnostic     Standing Status:   Future     Standing Expiration Date:   10/11/2023             Sandra Pedroza MD    Agree with the ROS entered by the MA.

## 2022-10-11 NOTE — LETTER
[address] [address]  Dept: [phone]  Dept Fax: [phone]        10/11/22    Patient: Vladimir Grace  YOB: 1950    Dear Arsenio Bruno MD,    I had the pleasure of seeing one of your patients, EDDIE MCNALLY today in the office today. Below are the relevant portions of my assessment and plan of care. IMPRESSION:  1. BPH with obstruction/lower urinary tract symptoms    2. History of elevated PSA        PLAN:  MRI is negative. EXo DX is elevated at 64. He has voiding complaints despite Avodart and Flomax BID. Will move forward with cysto and prostate biopsy. Will move forward under MAC. Return in about 4 weeks (around 11/8/2022) for surgery. Prescriptions Ordered:  No orders of the defined types were placed in this encounter. Orders Placed:  Orders Placed This Encounter   Procedures    PSA, Diagnostic     Standing Status:   Future     Standing Expiration Date:   10/11/2023          Thank you for allowing me to participate in the care of this patient. I will keep you updated on this patient's follow up and I look forward to serving you and your patients again in the future.         Lay Sahu MD

## 2022-10-12 ENCOUNTER — TELEPHONE (OUTPATIENT)
Dept: UROLOGY | Age: 72
End: 2022-10-12

## 2022-10-12 NOTE — TELEPHONE ENCOUNTER
Attempted to contact patient for procedure scheduling. Left voicemail for patient to call back for procedure scheduling.

## 2022-10-13 NOTE — TELEPHONE ENCOUNTER
Received call back from Father Reyna Greco, he is tentatively scheduled for 11/28/2022 @ [facility] . Trupti Herrera . will call back with all surgery details once scheduled

## 2022-10-17 DIAGNOSIS — Z79.2 PROPHYLACTIC ANTIBIOTIC: Primary | ICD-10-CM

## 2022-10-17 DIAGNOSIS — Z87.898 HISTORY OF ELEVATED PSA: ICD-10-CM

## 2022-10-17 LAB
FLUOROQUINOLONE RESISTANT ORG, CULT: NORMAL
ZZ NTE WITH NAME CLEAN UP: SPECIMEN SOURCE: NORMAL

## 2022-10-17 RX ORDER — CEPHALEXIN 500 MG/1
500 CAPSULE ORAL 3 TIMES DAILY
Qty: 9 CAPSULE | Refills: 0 | Status: SHIPPED | OUTPATIENT
Start: 2022-10-17 | End: 2022-10-20

## 2022-10-17 NOTE — PROGRESS NOTES
Status: Final result    Visible to patient: No (not released)    Dx: BPH with obstruction/lower urinary tr. .. Specimen Information: Rectal Swab   0 Result Notes  Component 6 d ago    Specimen Source . RECTAL SWAB    Fluoroquinolone Resistant Org Cult          Comment: PERFORMED AT [facility]   [address] [place], [place]  [address]   (NOTE)   Fluoroquinolone-Resistant Organism, Culture   ARUP test code [id number]   Collected: 10/11/2022 21:33 MT   Started: 10/13/2022 12:45 MT                                   Source: Rectal                 Body Site: Rectal Swab                 Free Text Sources: Rectal                                   Final Report                                   Culture negative for fluoroquinolone-resistant   organisms                 Interpretive Results   This test is not intended for screening donor material.     ===========================================================                    Resulting Agency [facility]              Specimen Collected: 10/11/22 21:33 EDT Last Resulted: 10/17/22 11:08 EDT           Patient is getting scheduled for prostate bx, has cipro allergy- will send keflex.

## 2022-11-11 RX ORDER — SODIUM CHLORIDE, SODIUM LACTATE, POTASSIUM CHLORIDE, CALCIUM CHLORIDE 600; 310; 30; 20 MG/100ML; MG/100ML; MG/100ML; MG/100ML
1000 INJECTION, SOLUTION INTRAVENOUS CONTINUOUS
Status: CANCELLED | OUTPATIENT
Start: 2022-11-11

## 2022-11-11 NOTE — DISCHARGE INSTRUCTIONS
Pre-operative Instructions    Please arrive at the surgery center by 12:15 PM on 11/28/2022 (or as directed by your surgeon's office). See Directons to Surgery Center below. FASTING    NOTHING TO EAT OR DRINK AFTER MIDNIGHT the night prior to surgery (This includes gum, candy, mints, chewing tobacco, etc). (Follow bowel prep instructions if instructed by your surgeon.)                MEDICATIONS    What to STOP: ANY BLOOD THINNING MEDICATION(S) as directed by your surgeon or prescribing physician. FAILURE TO STOP CERTAIN MEDICATIONS MAY INTERFERE WITH YOUR SCHEDULED SURGERY. According to the medication list you provided today, PLEASE STOP: ibuprofen (Motrin), diclofenac sodium (Voltaren)    2. What to CONTINUE leading up to your surgery:   Please take all your other daily medications except the medications listed above that you were instructed to hold. 3. What to [place] with SMALL SIP OF WATER: irbesartan (Avapro), pyridostigmine (Mestinon)                       IF APPLICABLE:  -If you have been given a blood band, you must bring it with you the day of surgery, unclasped.  -Use routine inhalers and bring inhalers the day of surgery.   -Bring C-Pap/Bi-pap with you morning of surgery if planning on staying in the hospital overnight.  -Do not take diabetic medications on the day of surgery. OTHER IMPORTANT REMINDERS    1) Please REMEMBER to get Covid-19 Screening if scheduled    2) You may be required to provide a urine sample upon your arrival to the pre-op area, so please take this into consideration. 3) If  NOT planning on staying in the hospital overnight : A. You will need an adult family member /friend to drive you home after your procedure.  Taxi cabs or any form of public transportation ALONE is not acceptable.   -Your  must be 25years of age or older and able to sign off on your discharge instructions.     -It is preferable that the friend or family member stay at the hospital throughout your procedure. Ron Varma must remain with you once you have arrived home for the first 24 hours after your surgery if you receive anesthesia or medication. If you do not have someone to stay with you, your procedure may be cancelled. 4) Do not wear any jewelry or body piercings day of surgery. 5) In case of illness - If you have cold or flu like symptoms (high fever, runny nose, sore throat, cough, etc.) rash, nausea, vomiting, loose stools, and/or recent contact with someone who has a contagious disease (Covid-19, chicken pox, measles, etc.) PLEASE notify your surgeon as soon as possible. 11/11/22  2:17 PM      ___________________  _______________________  Signature (Provider)              Signature (Patient)     Day of Surgery/Procedure    As a patient at [address] [place] [address] you can expect quality medical and nursing care that is centered on your individual needs. Our goal is to make your surgical experience as comfortable as possible  . Directions to the [address] [place] [address] is located at [address] [place], [address] Please pull into the Emergency/Surgery Center parking lot or there is additional parking across the street. You will enter the facility under through the glass doors and proceed to registration check-in which is right inside the door. Thereafter you will be directed to the [address] [place] [address]. Patient Instructions    ·Please shower the night before and the morning of surgery with an antibacterial soap. Please use the cleaning solution (bottle) given to you the night before your surgery after your shower. Unless otherwise told by your physician, please do not shave legs or any part of your body below your neck the night before or day of your surgery. You may shave your face or neck. ·Please wear loose, comfortable clothing.   If you are potentially going to have a cast or brace bring clothing that will fit over them. ·Bring a list of all medications you take, along with the dose of the medications and how often you take it. If more convenient bring the pharmacy bottles in a zip lock bag. ·Brush your teeth but do not swallow water. ·Bring your eyeglasses and case with you. No contacts are to be worn the day of surgery. You also may bring your hearing aids. ·Do not bring any valuables, such as jewelry, cash or credit cards. If you are staying overnight with us, please bring a SMALL bag of personal items. We cannot accommodate large items, like suitcases. ·If your child is having surgery please make arrangements for any other children to be cared for at home on the day of surgery. Other children are not permitted in recovery room and we want you to be able to spend time with the patient. If other arrangements are not available then we suggest that you have a second adult to stay in the waiting room. ·If you are having any type of anesthesia you are to have nothing to eat or drink after midnight the night before your surgery. This includes gum, mints, water or smoking or chewing tobacco.  The only exception to this is a small sip of water to take with any morning dose of heart, blood pressure, or seizure medications. ·Bring your inhaler if you are currently using one. ·Bring your blood band if one has been given to you. Please do not close the clasp. ·If you are on C-PAP or Bi-PAP at home and plan on staying in the hospital overnight for your surgery please bring the machine with you. ·Do not wear any jewelry or body piercings day of surgery. Also, NO lotion, perfume or deodorant to be used the day of surgery. If you have any other questions regarding your procedure/surgery please call  your surgeon's office.      If you have a last minute question(s) the DAY OF your surgery, you may call [phone]

## 2022-11-15 ENCOUNTER — HOSPITAL ENCOUNTER (OUTPATIENT)
Dept: PREADMISSION TESTING | Age: 72
Discharge: HOME | End: 2022-11-19
Payer: MEDICARE

## 2022-11-15 VITALS
SYSTOLIC BLOOD PRESSURE: 110 MMHG | RESPIRATION RATE: 18 BRPM | BODY MASS INDEX: 24.77 KG/M2 | TEMPERATURE: 97.6 F | WEIGHT: 173 LBS | OXYGEN SATURATION: 98 % | DIASTOLIC BLOOD PRESSURE: 63 MMHG | HEART RATE: 62 BPM | HEIGHT: 70 IN

## 2022-11-15 LAB
ANION GAP SERPL CALCULATED.3IONS-SCNC: 8 MMOL/L (ref 9–17)
BUN BLDV-MCNC: 18 MG/DL (ref 8–23)
CHLORIDE BLD-SCNC: 106 MMOL/L (ref 98–107)
CO2: 26 MMOL/L (ref 20–31)
CREAT SERPL-MCNC: 0.59 MG/DL (ref 0.7–1.2)
GFR SERPL CREATININE-BSD FRML MDRD: >60 ML/MIN/1.73M2
GLUCOSE BLD-MCNC: 76 MG/DL (ref 70–99)
HCT VFR BLD CALC: 39.9 % (ref 40.7–50.3)
HEMOGLOBIN: 12.8 G/DL (ref 13–17)
POTASSIUM SERPL-SCNC: 4.7 MMOL/L (ref 3.7–5.3)
SODIUM BLD-SCNC: 140 MMOL/L (ref 135–144)

## 2022-11-15 PROCEDURE — 82947 ASSAY GLUCOSE BLOOD QUANT: CPT

## 2022-11-15 PROCEDURE — 85014 HEMATOCRIT: CPT

## 2022-11-15 PROCEDURE — 85018 HEMOGLOBIN: CPT

## 2022-11-15 PROCEDURE — 93005 ELECTROCARDIOGRAM TRACING: CPT | Performed by: ANESTHESIOLOGY

## 2022-11-15 PROCEDURE — 80051 ELECTROLYTE PANEL: CPT

## 2022-11-15 PROCEDURE — 82565 ASSAY OF CREATININE: CPT

## 2022-11-15 PROCEDURE — 36415 COLL VENOUS BLD VENIPUNCTURE: CPT

## 2022-11-15 PROCEDURE — 87086 URINE CULTURE/COLONY COUNT: CPT

## 2022-11-15 PROCEDURE — 84520 ASSAY OF UREA NITROGEN: CPT

## 2022-11-15 RX ORDER — IBUPROFEN 800 MG/1
800 TABLET ORAL 3 TIMES DAILY
COMMUNITY

## 2022-11-15 NOTE — PROGRESS NOTES
Anesthesia Focused Assessment    Hx of anesthesia complications:  no  Family hx of anesthesia complications:  no      Prior + Covid-19 test?  yes  Date: 10/22/2022  Symptoms: cough, congestion, myalgias, fatigue x 5 days  Complications: none  Hospitalization required? no      STOP-BANG Sleep Apnea Questionnaire    SNORE loudly (heard through closed doors)? No  TIRED, fatigued, sleepy during daytime? No  OBSERVED stopping breathing during sleep? No  High blood PRESSURE or being treated? Yes    BMI over 35? No  AGE over 48? Yes  NECK circumference over 16\"? No  GENDER (male)? Yes             Total 3  High risk 5-8  Intermediate risk 3-4  Low risk 0-2    ----------------------------------------------------------------------------------------------------------------------  JOELLE                              No  If yes, machine? DM1                                            No  DM2                   No    Coronary Artery Disease      No  HTN         Yes  Defib/AICD/Pacemaker               No             Renal Failure                   No  If yes, on dialysis           Active smoker? No  Drinks alcohol? Yes, 2-3 per week  Illicit drugs? No  Dentition?        benign      Past Medical History:   Diagnosis Date    Arthritis     right shoulder, knee    BPH (benign prostatic hyperplasia)     Caffeine use     2 cups coffee/day    Chronic back pain     Chronic lymphocytic leukemia ([address]     numbers are stable. [facility] Dr. Edson Miller. 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[address]     Sciatica     lower back    Vitamin D deficiency     Wears glasses          Patient was evaluated in PAT & anesthesia guidelines were applied.    NPO guidelines, medication instructions and scheduled arrival time were reviewed with patient. Patient was sent for post PAT anesthesia interview for covid + history and evaluated by Dr. Stacy Morales. Medical or cardiac clearance ordered: no, per Dr. Stacy Morales, recommended to delay surgery 6 weeks due to recent covid history.      ALONDRA Clarke - CNP   11/15/22  3:50 PM

## 2022-11-16 ENCOUNTER — TELEPHONE (OUTPATIENT)
Dept: UROLOGY | Age: 72
End: 2022-11-16

## 2022-11-16 LAB
CULTURE: NO GROWTH
EKG ATRIAL RATE: 62 BPM
EKG P AXIS: 51 DEGREES
EKG P-R INTERVAL: 114 MS
EKG Q-T INTERVAL: 414 MS
EKG QRS DURATION: 84 MS
EKG QTC CALCULATION (BAZETT): 420 MS
EKG R AXIS: 41 DEGREES
EKG T AXIS: 39 DEGREES
EKG VENTRICULAR RATE: 62 BPM
SPECIMEN DESCRIPTION: NORMAL

## 2022-11-16 PROCEDURE — 93010 ELECTROCARDIOGRAM REPORT: CPT | Performed by: INTERNAL MEDICINE

## 2022-11-16 NOTE — TELEPHONE ENCOUNTER
Received call from [address] at [facility] - pt tested COVID positive on 10/22/2022. PT scheduled for Prostate Biopsy on 11/28/2022. Anesthesia recommended tat surgery be delayed for 6 weeks. .. pt will be 5 weeks post COVID diagnosis for procedure. Spoke to patient, he does not wish to post pone procedure. Patient stated he is feeling better and is back to his old self. Spoke to Dr. Chante Bradley notified of anesthesia's recommendation & pts wishes to proceed. Per verbal instructions from Dr. Chante Bradley - proceed as planned unless pts COVID symptoms return. Spoke to [address] in [facility] to notify her of Dr. Aleida Rueda decision. Patient notified & verbalized an understanding.

## 2022-11-22 ENCOUNTER — TELEPHONE (OUTPATIENT)
Dept: UROLOGY | Age: 72
End: 2022-11-22

## 2022-11-22 NOTE — TELEPHONE ENCOUNTER
11/28/2022 Procedure w/ Dr. White Bio time changed to 12:30pm; 10:30am arrival. NPO: midnight. Pt notified of time change. Pt verbalized an understanding and all questions were answered.

## 2022-11-23 ENCOUNTER — TELEPHONE (OUTPATIENT)
Dept: UROLOGY | Age: 72
End: 2022-11-23

## 2022-11-23 NOTE — TELEPHONE ENCOUNTER
Procedure time changed to 1:45pm; 11:45am arrival. NPO midnight. Pt notified. Pt verbalized an understanding and all questions were answered.

## 2022-11-28 ENCOUNTER — ANESTHESIA EVENT (OUTPATIENT)
Dept: OPERATING ROOM | Age: 72
End: 2022-11-28
Payer: MEDICARE

## 2022-11-28 ENCOUNTER — ANESTHESIA (OUTPATIENT)
Dept: OPERATING ROOM | Age: 72
End: 2022-11-28
Payer: MEDICARE

## 2022-11-28 ENCOUNTER — HOSPITAL ENCOUNTER (OUTPATIENT)
Dept: ULTRASOUND IMAGING | Age: 72
Discharge: HOME | End: 2022-11-30
Payer: MEDICARE

## 2022-11-28 ENCOUNTER — HOSPITAL ENCOUNTER (OUTPATIENT)
Age: 72
Setting detail: OUTPATIENT SURGERY
Discharge: HOME | End: 2022-11-28
Attending: UROLOGY | Admitting: UROLOGY
Payer: MEDICARE

## 2022-11-28 VITALS
HEIGHT: 70 IN | HEART RATE: 50 BPM | TEMPERATURE: 96.8 F | RESPIRATION RATE: 18 BRPM | SYSTOLIC BLOOD PRESSURE: 127 MMHG | BODY MASS INDEX: 24.77 KG/M2 | OXYGEN SATURATION: 99 % | WEIGHT: 173 LBS | DIASTOLIC BLOOD PRESSURE: 78 MMHG

## 2022-11-28 DIAGNOSIS — R97.20 ELEVATED PSA: ICD-10-CM

## 2022-11-28 PROCEDURE — 6360000002 HC RX W HCPCS: Performed by: NURSE ANESTHETIST, CERTIFIED REGISTERED

## 2022-11-28 PROCEDURE — 2709999900 HC NON-CHARGEABLE SUPPLY: Performed by: UROLOGY

## 2022-11-28 PROCEDURE — 3700000000 HC ANESTHESIA ATTENDED CARE: Performed by: UROLOGY

## 2022-11-28 PROCEDURE — 3700000001 HC ADD 15 MINUTES (ANESTHESIA): Performed by: UROLOGY

## 2022-11-28 PROCEDURE — 7100000041 HC SPAR PHASE II RECOVERY - ADDTL 15 MIN: Performed by: UROLOGY

## 2022-11-28 PROCEDURE — 76942 ECHO GUIDE FOR BIOPSY: CPT

## 2022-11-28 PROCEDURE — 2580000003 HC RX 258: Performed by: ANESTHESIOLOGY

## 2022-11-28 PROCEDURE — 2500000003 HC RX 250 WO HCPCS: Performed by: UROLOGY

## 2022-11-28 PROCEDURE — 7100000040 HC SPAR PHASE II RECOVERY - FIRST 15 MIN: Performed by: UROLOGY

## 2022-11-28 PROCEDURE — 2580000003 HC RX 258: Performed by: UROLOGY

## 2022-11-28 PROCEDURE — 2500000003 HC RX 250 WO HCPCS: Performed by: NURSE ANESTHETIST, CERTIFIED REGISTERED

## 2022-11-28 PROCEDURE — 3600000012 HC SURGERY LEVEL 2 ADDTL 15MIN: Performed by: UROLOGY

## 2022-11-28 PROCEDURE — 88305 TISSUE EXAM BY PATHOLOGIST: CPT

## 2022-11-28 PROCEDURE — 3600000002 HC SURGERY LEVEL 2 BASE: Performed by: UROLOGY

## 2022-11-28 RX ORDER — PROPOFOL 10 MG/ML
INJECTION, EMULSION INTRAVENOUS PRN
Status: DISCONTINUED | OUTPATIENT
Start: 2022-11-28 | End: 2022-11-28 | Stop reason: SDUPTHER

## 2022-11-28 RX ORDER — LIDOCAINE HYDROCHLORIDE 10 MG/ML
INJECTION, SOLUTION EPIDURAL; INFILTRATION; INTRACAUDAL; PERINEURAL PRN
Status: DISCONTINUED | OUTPATIENT
Start: 2022-11-28 | End: 2022-11-28 | Stop reason: ALTCHOICE

## 2022-11-28 RX ORDER — AZITHROMYCIN 250 MG/1
TABLET, FILM COATED ORAL
COMMUNITY
Start: 2022-10-31

## 2022-11-28 RX ORDER — POLYETHYLENE GLYCOL 3350, SODIUM CHLORIDE, SODIUM BICARBONATE, POTASSIUM CHLORIDE 420; 11.2; 5.72; 1.48 G/4L; G/4L; G/4L; G/4L
POWDER, FOR SOLUTION ORAL
COMMUNITY
Start: 2022-10-12

## 2022-11-28 RX ORDER — LIDOCAINE HYDROCHLORIDE 10 MG/ML
INJECTION, SOLUTION EPIDURAL; INFILTRATION; INTRACAUDAL; PERINEURAL PRN
Status: DISCONTINUED | OUTPATIENT
Start: 2022-11-28 | End: 2022-11-28 | Stop reason: SDUPTHER

## 2022-11-28 RX ORDER — SODIUM CHLORIDE, SODIUM LACTATE, POTASSIUM CHLORIDE, CALCIUM CHLORIDE 600; 310; 30; 20 MG/100ML; MG/100ML; MG/100ML; MG/100ML
1000 INJECTION, SOLUTION INTRAVENOUS CONTINUOUS
Status: DISCONTINUED | OUTPATIENT
Start: 2022-11-28 | End: 2022-11-28 | Stop reason: HOSPADM

## 2022-11-28 RX ORDER — SODIUM CHLORIDE 0.9 % (FLUSH) 0.9 %
5-40 SYRINGE (ML) INJECTION EVERY 12 HOURS SCHEDULED
Status: DISCONTINUED | OUTPATIENT
Start: 2022-11-28 | End: 2022-11-28 | Stop reason: HOSPADM

## 2022-11-28 RX ORDER — SODIUM CHLORIDE 9 MG/ML
INJECTION, SOLUTION INTRAVENOUS PRN
Status: DISCONTINUED | OUTPATIENT
Start: 2022-11-28 | End: 2022-11-28 | Stop reason: HOSPADM

## 2022-11-28 RX ORDER — MAGNESIUM HYDROXIDE 1200 MG/15ML
LIQUID ORAL PRN
Status: DISCONTINUED | OUTPATIENT
Start: 2022-11-28 | End: 2022-11-28 | Stop reason: ALTCHOICE

## 2022-11-28 RX ORDER — SODIUM CHLORIDE 0.9 % (FLUSH) 0.9 %
5-40 SYRINGE (ML) INJECTION PRN
Status: DISCONTINUED | OUTPATIENT
Start: 2022-11-28 | End: 2022-11-28 | Stop reason: HOSPADM

## 2022-11-28 RX ADMIN — PROPOFOL 30 MG: 10 INJECTION, EMULSION INTRAVENOUS at 13:52

## 2022-11-28 RX ADMIN — PROPOFOL 50 MG: 10 INJECTION, EMULSION INTRAVENOUS at 13:50

## 2022-11-28 RX ADMIN — Medication 2 G: at 14:07

## 2022-11-28 RX ADMIN — LIDOCAINE HYDROCHLORIDE 50 MG: 10 INJECTION, SOLUTION EPIDURAL; INFILTRATION; INTRACAUDAL; PERINEURAL at 13:56

## 2022-11-28 RX ADMIN — PROPOFOL 200 MCG/KG/MIN: 10 INJECTION, EMULSION INTRAVENOUS at 13:58

## 2022-11-28 RX ADMIN — SODIUM CHLORIDE, POTASSIUM CHLORIDE, SODIUM LACTATE AND CALCIUM CHLORIDE 1000 ML: 600; 310; 30; 20 INJECTION, SOLUTION INTRAVENOUS at 12:40

## 2022-11-28 RX ADMIN — LIDOCAINE HYDROCHLORIDE 50 MG: 10 INJECTION, SOLUTION EPIDURAL; INFILTRATION; INTRACAUDAL; PERINEURAL at 14:15

## 2022-11-28 ASSESSMENT — PAIN SCALES - GENERAL: PAINLEVEL_OUTOF10: 0

## 2022-11-28 ASSESSMENT — PAIN - FUNCTIONAL ASSESSMENT: PAIN_FUNCTIONAL_ASSESSMENT: 0-10

## 2022-11-28 NOTE — DISCHARGE INSTRUCTIONS
You may experience blood in stool, urine, and semen. This should resolve over the next couple days. Tylenol for pain control  Pt ok to discharge home in good condition  No heavy lifting, >10 lbs for today  Pt should avoid strenuous activity for today  Pt should walk moderately at home  Pt ok to shower   Pt may resume diet as tolerated  Pt should take Rx as directed: antibiotic that was previously perscribed. No driving while on narcotics  Please call attending physician or hospital  with questions  Call or Present to ED if fever (> 101F), intractable nausea vomiting or pain. Pt should follow up with Dr. Atwood in 1 week for pathology results. call to confirm appointment       No alcoholic beverages, no driving or operating machinery, no making important decisions for 24 hours. You may have a normal diet but should eat lightly day of surgery. Drink plenty of fluids.   Urinate within 8 hours after surgery, if unable to urinate call your doctor

## 2022-11-28 NOTE — ANESTHESIA POSTPROCEDURE EVALUATION
Department of Anesthesiology  Postprocedure Note    Patient: Hang Alex  MRN: [id number]  YOB: 1950  Date of evaluation: 11/28/2022      Procedure Summary     Date: 11/28/22 Room / Location: [address]    Anesthesia Start: 8679 Anesthesia Stop: 9495    Procedure: CYSTOSCOPY,  PROSTATE BIOPSY ULTRASOUND Diagnosis:       Elevated PSA      (HISTORY OF ELEVATED PSA,)    Surgeons: Kathleen Acosta MD Responsible Provider: Candi Bedolla MD    Anesthesia Type: MAC ASA Status: 2          Anesthesia Type: No value filed.     Na Phase I:      Na Phase II: Na Score: 4      Anesthesia Post Evaluation    Patient location during evaluation: PACU  Patient participation: complete - patient participated  Level of consciousness: awake and alert  Pain score: 2  Airway patency: patent  Nausea & Vomiting: no nausea and no vomiting  Complications: no  Cardiovascular status: hemodynamically stable  Respiratory status: acceptable  Hydration status: stable

## 2022-11-28 NOTE — H&P
History and Physical    Patient:  Juliana Bruner  MRN: [id number]  YOB: 1950    CHIEF COMPLAINT:  Elevated PSA    HISTORY OF PRESENT ILLNESS:   The patient is a 67 y.o. male who presents with elevated PSA, ExoDx 64, PIRADS 2 on MRI, on OTC testosterone supplement, difficulty streaming desipte avodart and flomax bid. Past Medical History:    Past Medical History:   Diagnosis Date    Arthritis     right shoulder, knee    BPH (benign prostatic hyperplasia)     Caffeine use     2 cups coffee/day    Chronic back pain     Chronic lymphocytic leukemia ([facility]     numbers are stable. [facility] Dr. Danilo Issa. 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[facility]     Sciatica     lower back    Vitamin D deficiency     Wears glasses        Past Surgical History:    Past Surgical History:   Procedure Laterality Date    COLONOSCOPY      2 times    KNEE ARTHROSCOPY Bilateral     meniscus    LITHOTRIPSY      3 times    TONSILLECTOMY AND ADENOIDECTOMY  1959       Medications Prior to Admission:    Prior to Admission medications    Medication Sig Start Date End Date Taking?  Authorizing Provider   ibuprofen (ADVIL;MOTRIN) 800 MG tablet Take 800 mg by mouth three times daily    Historical Provider, MD   diclofenac sodium (VOLTAREN) 1 % GEL Apply 1 g topically 2 times daily    Historical Provider, MD   tamsulosin (FLOMAX) 0.4 MG capsule Take 1 capsule by mouth in the morning and at bedtime 8/30/22   Bisi Fitzgerald MD   irbesartan (AVAPRO) 150 MG tablet Take 1 tablet by mouth daily 8/22/22   Ciro Suarez MD   dutasteride (AVODART) 0.5 MG capsule take 1 capsule by mouth once daily 7/17/22   Bisi Fitzgerald MD   simvastatin (ZOCOR) 20 MG tablet Take 1 tablet by mouth nightly 5/10/22   Ciro Suarez MD   CLARITIN-D 24 HOUR  MG per extended release tablet Take 1 tablet by mouth daily 4/14/22   Roseline Burrows MD   Cholecalciferol 2000 units TABS Take 2,000 Units by mouth daily D3    Historical Provider, MD   Glucosamine HCl--250 MG TABS Take 2 tablets by mouth daily Move Free    Historical Provider, MD   pyridostigmine (MESTINON) 60 MG tablet Take 60 mg by mouth 4 times daily     Historical Provider, MD       Allergies:  Ciprofloxacin    Social History:    Social History     Socioeconomic History    Marital status: Single     Spouse name: Not on file    Number of children: Not on file    Years of education: Not on file    Highest education level: Not on file   Occupational History    Not on file   Tobacco Use    Smoking status: Never    Smokeless tobacco: Never    Tobacco comments:     smoked a pipe about 1 year in his 19's   Vaping Use    Vaping Use: Never used   Substance and Sexual Activity    Alcohol use: Yes     Comment: 2-3 times a week    Drug use: Never    Sexual activity: Not on file   Other Topics Concern    Not on file   Social History Narrative    Not on file     Social Determinants of Health     Financial Resource Strain: Not on file   Food Insecurity: Not on file   Transportation Needs: Not on file   Physical Activity: Sufficiently Active    Days of Exercise per Week: 3 days    Minutes of Exercise per Session: 60 min   Stress: Not on file   Social Connections: Not on file   Intimate Partner Violence: Not on file   Housing Stability: Not on file       Family History:    Family History   Problem Relation Age of Onset    Glaucoma Mother     Other Mother         tia    Dementia Father     Prostate Cancer Brother     Heart Attack Maternal Uncle        REVIEW OF SYSTEMS:  Constitutional: negative  Eyes: negative  Respiratory: negative  Cardiovascular: negative  Gastrointestinal: negative  Genitourinary: see HPI  Musculoskeletal: negative  Skin: negative   Neurological: negative  Hematological/Lymphatic: negative  Psychological: negative      Physical Exam:      No data found. Constitutional: Patient in no acute distress; Neuro: alert and oriented to person place and time. Psych: Mood and affect normal.  Lungs: Respiratory effort normal  Cardiovascular:  Normal peripheral pulses. Regular rate. Abdomen: Soft, non-tender, non-distended        LABS:   No results for input(s): WBC, HGB, HCT, MCV, PLT in the last 72 hours. No results for input(s): NA, K, CL, CO2, PHOS, BUN, CREATININE, CA in the last 72 hours. Lab Results   Component Value Date    PSA 3.78 08/08/2022    PSA 4.28 (H) 07/21/2022    PSA 2.45 07/14/2021       Additional Lab/culture results:    Urinalysis: No results for input(s): COLORU, PHUR, LABCAST, WBCUA, RBCUA, MUCUS, TRICHOMONAS, YEAST, BACTERIA, CLARITYU, SPECGRAV, LEUKOCYTESUR, UROBILINOGEN, BILIRUBINUR, BLOODU in the last 72 hours. Invalid input(s): NITRATE, GLUCOSEUKETONESUAMORPHOUS     -----------------------------------------------------------------  Imaging Results:    Assessment and Plan   Impression:    67 y.o. male with difficulty streaming, elevated PSA, PIRADS 2    Plan:   OR today for cystoscopy, prostate biopsy.     Liam Poon MD  9:43 PM 11/27/2022

## 2022-11-28 NOTE — ANESTHESIA PRE PROCEDURE
Department of Anesthesiology  Preprocedure Note       Name:  Darlene Gorman   Age:  67 y.o.  :  1950                                          MRN:  [id number]         Date:  2022      Surgeon: Mi Delgado):  Yomaira Ling MD    Procedure: Procedure(s):  CYSTOSCOPY PROSTATE BIOPSY, ULTRASOUND    Medications prior to admission:   Prior to Admission medications    Medication Sig Start Date End Date Taking?  Authorizing Provider   azithromycin (ZITHROMAX) 250 MG tablet take 2 tablets by mouth TODAY then take 1 tablet DAILY FOR 4 DAYS 10/31/22   Historical Provider, MD   polyethylene glycol-electrolytes (NULYTELY) 420 g solution  10/12/22   Historical Provider, MD   ibuprofen (ADVIL;MOTRIN) 800 MG tablet Take 800 mg by mouth three times daily    Historical Provider, MD   diclofenac sodium (VOLTAREN) 1 % GEL Apply 1 g topically 2 times daily    Historical Provider, MD   tamsulosin (FLOMAX) 0.4 MG capsule Take 1 capsule by mouth in the morning and at bedtime 22   Yomaira Ling MD   irbesartan (AVAPRO) 150 MG tablet Take 1 tablet by mouth daily 22   Gerardo Fair MD   dutasteride (AVODART) 0.5 MG capsule take 1 capsule by mouth once daily 22   Yomaira Ling MD   simvastatin (ZOCOR) 20 MG tablet Take 1 tablet by mouth nightly 5/10/22   Gerardo Fair MD   CLARITIN-D 24 HOUR  MG per extended release tablet Take 1 tablet by mouth daily 22   Gerardo Fair MD   Cholecalciferol 2000 units TABS Take 2,000 Units by mouth daily D3    Historical Provider, MD   Glucosamine HCl--250 MG TABS Take 2 tablets by mouth daily Move Free    Historical Provider, MD   pyridostigmine (MESTINON) 60 MG tablet Take 60 mg by mouth 4 times daily     Historical Provider, MD       Current medications:    Current Facility-Administered Medications   Medication Dose Route Frequency Provider Last Rate Last Admin    lactated ringers infusion 1,000 mL  1,000 mL IntraVENous Continuous Leahej Salinasr Rebeca Camarena MD           Allergies: Allergies   Allergen Reactions    Ciprofloxacin Other (See Comments)     Chronic lymph leukemia, , occular miastenia gravis and cipro make it worse         Problem List:    Patient Active Problem List   Diagnosis Code    Microscopic hematuria R31.29    Calculus of kidney N20.0    Hypertrophy of prostate with urinary obstruction and other lower urinary tract symptoms (LUTS) N40.1, N13.8    Chronic low back pain M54.50, G89.29    Chronic lymphocytic leukemia (HCC) C91.10    Herpes zoster B02.9    Acquired spondylolisthesis M43.10    Degeneration of lumbar intervertebral disc M51.36    Dermatochalasis of left upper eyelid H02.834    Dermatochalasis of right upper eyelid H02.831    Essential hypertension I10    Hyperglycemia R73.9    Hyperlipidemia E78.5    Lesion of ulnar nerve G56.20    Localized, primary osteoarthritis of shoulder region M19.019    Lower urinary tract symptoms due to benign prostatic hyperplasia N40.1    Lumbosacral spondylosis without myelopathy M47.817    Nuclear sclerosis of both eyes H25.13    Nuclear senile cataract H25.10    Osteoarthritis of knee M17.9    Pes anserinus tendinitis M76.899    Preglaucoma, unspecified, bilateral H40.003    Rupture of right rotator cuff M75.101    Tear of lateral meniscus of right knee, current S83.281A    History of myasthenia gravis Z86.69       Past Medical History:        Diagnosis Date    Arthritis     right shoulder, knee    BPH (benign prostatic hyperplasia)     Caffeine use     2 cups coffee/day    Chronic back pain     Chronic lymphocytic leukemia (HCC)     numbers are stable. [facility] Dr. Hetal Cline.  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[address]     Sciatica     lower back    Vitamin D deficiency  Wears glasses        Past Surgical History:        Procedure Laterality Date    COLONOSCOPY      2 times    KNEE ARTHROSCOPY Bilateral     meniscus    LITHOTRIPSY      3 times    TONSILLECTOMY AND ADENOIDECTOMY  1959       Social History:    Social History     Tobacco Use    Smoking status: Never    Smokeless tobacco: Never    Tobacco comments:     smoked a pipe about 1 year in his 19's   Substance Use Topics    Alcohol use: Yes     Comment: 2-3 times a week                                Counseling given: Not Answered  Tobacco comments: smoked a pipe about 1 year in his 20's      Vital Signs (Current):   Vitals:    11/28/22 1232   BP: 123/76   Pulse: 56   Resp: 18   Temp: 96.8 °F (36 °C)   SpO2: 100%   Weight: 173 lb (78.5 kg)   Height: 5' 10\" (1.778 m)                                              BP Readings from Last 3 Encounters:   11/28/22 123/76   11/15/22 110/63   10/04/22 110/60       NPO Status: Time of last liquid consumption: 1800                        Time of last solid consumption: 1800                        Date of last liquid consumption: 11/27/22                        Date of last solid food consumption: 11/27/22    BMI:   Wt Readings from Last 3 Encounters:   11/28/22 173 lb (78.5 kg)   11/15/22 173 lb (78.5 kg)   10/11/22 175 lb (79.4 kg)     Body mass index is 24.82 kg/m².     CBC:   Lab Results   Component Value Date/Time    WBC 26.1 10/25/2019 03:35 PM    RBC 4.49 10/25/2019 03:35 PM    HGB 12.8 11/15/2022 02:46 PM    HCT 39.9 11/15/2022 02:46 PM    MCV 93.4 10/25/2019 03:35 PM    RDW 14.0 10/25/2019 03:35 PM     10/25/2019 03:35 PM       CMP:   Lab Results   Component Value Date/Time     11/15/2022 02:46 PM    K 4.7 11/15/2022 02:46 PM     11/15/2022 02:46 PM    CO2 26 11/15/2022 02:46 PM    BUN 18 11/15/2022 02:46 PM    CREATININE 0.59 11/15/2022 02:46 PM    GFRAA >60 10/25/2019 03:35 PM    LABGLOM >60 11/15/2022 02:46 PM    GLUCOSE 76 11/15/2022 02:46 PM CALCIUM 9.4 10/25/2019 03:35 PM       POC Tests: No results for input(s): POCGLU, POCNA, POCK, POCCL, POCBUN, POCHEMO, POCHCT in the last 72 hours. Coags: No results found for: PROTIME, INR, APTT    HCG (If Applicable): No results found for: PREGTESTUR, PREGSERUM, HCG, HCGQUANT     ABGs: No results found for: PHART, PO2ART, CVP6GUH, ZAG3AER, BEART, F3IEAIRJ     Type & Screen (If Applicable):  No results found for: LABABO, LABRH    Drug/Infectious Status (If Applicable):  No results found for: HIV, HEPCAB    COVID-19 Screening (If Applicable): No results found for: COVID19        Anesthesia Evaluation    Airway: Mallampati: II     Neck ROM: full     Dental:          Pulmonary:Negative Pulmonary ROS breath sounds clear to auscultation                             Cardiovascular:    (+) hypertension:, hyperlipidemia        Rhythm: regular  Rate: normal                    Neuro/Psych:   (+) neuromuscular disease: myasthenia gravis,             GI/Hepatic/Renal:   (+) renal disease: kidney stones,           Endo/Other:    (+) : arthritis:., malignancy/cancer. Abdominal:         (-) obese       Vascular: negative vascular ROS. Other Findings:           Anesthesia Plan      MAC     ASA 2       Induction: intravenous. Anesthetic plan and risks discussed with patient. Plan discussed with CRNA.                     Kate Mtz MD   11/28/2022

## 2022-11-28 NOTE — OP NOTE
Operative Note      Patient: Zane Hernandez  YOB: 1950  MRN: [id number]    Date of Procedure: 11/28/2022    Pre-Op Diagnosis: HISTORY OF ELEVATED PSA    Post-Op Diagnosis: Same       Procedure(s):  CYSTOSCOPY,  PROSTATE BIOPSY ULTRASOUND    Surgeon(s):  Sandra Pedroza MD    Assistant:   Resident: Odalis Chatman MD    Anesthesia: General    Estimated Blood Loss (mL): Minimal    Complications: None    Specimens:   ID Type Source Tests Collected by Time Destination   A : PROSTATE BIOPSY X 12 Tissue Prostate Dougie Valle MD 11/28/2022 1409        Implants:  * No implants in log *      Drains: * No LDAs found *    Findings:   Cystoscopy: Bilateral prostatic lobes obstructing, intravesical median lobe      Indication: This is 67 y.o. gentleman with PSA of 3.78. Lab Results   Component Value Date    PSA 3.78 08/08/2022    PSA 4.28 (H) 07/21/2022      He is here today for cystoscopy, biopsy of the prostate. Risks benefits and alternatives goals and possible complications of the procedure were explained to the patient for consent was obtained. He has elected to proceed. Procedure in Detail: Patient was brought back to the operating room table. He was laid in the left lateral position. EPC cuffs were placed on and functioning prior to induction of anesthesia. Anesthesia was inducted. A timeout performed. We started with the cystoscopy portion of the procedure. A flexible cystoscope was lubricated and inserted to the patient's urethra advanced into the bladder. Bilateral obstructing prostatic lobes was observed. In the bladder bilateral ureteral orifice ease were in orthotopic positions. Complete cystoscopy did not reveal any tumors or lesions. Upon retroflexion of the cystoscope a large intravesical median lobe was observed. The flexible cystoscope was removed and that concluded this portion of the procedure.     We then proceeded with the prostate biopsy part of the operation. The ultrasound probe was placed per rectum using the Bluetest biopsy system. The prostate was brought into view. His prostate is approximately 43 cc by ultrasound. Seminal vesicles appeared normal.      We used 1 percent lidocaine to inject around the neurovascular bundle bilaterally. We proceeded with a standard 12 core biopsy starting on the right side taking biopsies from the medial and lateral aspects of the base, mid and apical gland and then on the left side. These core specimens were sent off for permanent pathology. After completion of the biopsy we then removed the ultrasound probe. There is no evidence of brisk bleeding per the rectum. The patient tolerated the procedure well. His anesthesia was reversed. He was then taken to recovery in stable condition. He was discharged home in stable condition and instructed to follow-up for review of his pathology results. He is instructed to call if he has any fevers shaking chills. Dr. Atwood was present for the entirety of the procedure. Disposition Follow-up: Patient will follow up in 1-2 weeks for review of pathology results. Patient will need to undergo BPH surgery.        Electronically signed by Rao Bell MD on 11/28/2022 at 2:20 PM

## 2022-12-01 LAB — SURGICAL PATHOLOGY REPORT: NORMAL

## 2022-12-06 ENCOUNTER — OFFICE VISIT (OUTPATIENT)
Dept: UROLOGY | Age: 72
End: 2022-12-06
Payer: MEDICARE

## 2022-12-06 VITALS — HEIGHT: 70 IN | WEIGHT: 173 LBS | TEMPERATURE: 98.6 F | BODY MASS INDEX: 24.77 KG/M2

## 2022-12-06 DIAGNOSIS — Z87.898 HISTORY OF ELEVATED PSA: Primary | ICD-10-CM

## 2022-12-06 DIAGNOSIS — N40.1 BPH WITH OBSTRUCTION/LOWER URINARY TRACT SYMPTOMS: ICD-10-CM

## 2022-12-06 DIAGNOSIS — N13.8 BPH WITH OBSTRUCTION/LOWER URINARY TRACT SYMPTOMS: ICD-10-CM

## 2022-12-06 DIAGNOSIS — C61 PROSTATE CANCER (HCC): ICD-10-CM

## 2022-12-06 PROCEDURE — G8427 DOCREV CUR MEDS BY ELIG CLIN: HCPCS | Performed by: UROLOGY

## 2022-12-06 PROCEDURE — 3017F COLORECTAL CA SCREEN DOC REV: CPT | Performed by: UROLOGY

## 2022-12-06 PROCEDURE — 99214 OFFICE O/P EST MOD 30 MIN: CPT | Performed by: UROLOGY

## 2022-12-06 PROCEDURE — 1123F ACP DISCUSS/DSCN MKR DOCD: CPT | Performed by: UROLOGY

## 2022-12-06 PROCEDURE — 1036F TOBACCO NON-USER: CPT | Performed by: UROLOGY

## 2022-12-06 PROCEDURE — G8420 CALC BMI NORM PARAMETERS: HCPCS | Performed by: UROLOGY

## 2022-12-06 PROCEDURE — G8484 FLU IMMUNIZE NO ADMIN: HCPCS | Performed by: UROLOGY

## 2022-12-06 ASSESSMENT — ENCOUNTER SYMPTOMS
SHORTNESS OF BREATH: 0
EYE REDNESS: 0
VOMITING: 0
EYE PAIN: 0
WHEEZING: 0
COUGH: 0
BACK PAIN: 0
DIARRHEA: 0
NAUSEA: 0
ABDOMINAL PAIN: 0
CONSTIPATION: 0

## 2022-12-06 NOTE — LETTER
[address] [address]  Dept: [phone]  Dept Fax: [phone]        12/6/22    Patient: Cipriano Pereira  YOB: 1950    Dear Lashaun Hammond MD,    I had the pleasure of seeing one of your patients, EDDIE MCNALLY today in the office today. Below are the relevant portions of my assessment and plan of care. IMPRESSION:  1. History of elevated PSA    2. Prostate cancer ([address]    3. BPH with obstruction/lower urinary tract symptoms        PLAN:  Doing well post biopsy. Path showed [place] [address] disease. Will obtain CT and bone scan. Likely will need radical prostatectomy. Return in about 2 weeks (around 12/20/2022). Prescriptions Ordered:  No orders of the defined types were placed in this encounter. Orders Placed:  Orders Placed This Encounter   Procedures    CT PELVIS W CONTRAST     Standing Status:   Future     Standing Expiration Date:   12/6/2023     Order Specific Question:   Additional Contrast?     Answer:   Oral     Order Specific Question:   STAT Creatinine as needed:     Answer:   No    NM BONE SCAN WHOLE BODY     Standing Status:   Future     Standing Expiration Date:   12/6/2023          Thank you for allowing me to participate in the care of this patient. I will keep you updated on this patient's follow up and I look forward to serving you and your patients again in the future.         Cyndi King MD

## 2022-12-06 NOTE — PROGRESS NOTES
Review of Systems   Constitutional:  Negative for chills, fatigue and fever. Eyes:  Negative for pain, redness and visual disturbance. Respiratory:  Negative for cough, shortness of breath and wheezing. Cardiovascular:  Negative for chest pain and leg swelling. Gastrointestinal:  Negative for abdominal pain, constipation, diarrhea, nausea and vomiting. Genitourinary:  Positive for hematuria. Negative for difficulty urinating, dysuria, flank pain, frequency, scrotal swelling, testicular pain and urgency. Musculoskeletal:  Negative for back pain, joint swelling and myalgias. Skin:  Negative for rash and wound. Neurological:  Negative for dizziness, tremors, weakness and numbness. Hematological:  Does not bruise/bleed easily.

## 2022-12-06 NOTE — PROGRESS NOTES
[address] [address]  Dept: [address] [facility] Urology Office Note - Established    Patient:  Eladio Celaya  YOB: 1950  Date: 12/6/2022    The patient is a 67 y.o. male who presents todayfor evaluation of the following problems:   Chief Complaint   Patient presents with    Results       HPI  He is here in follow up after prostate biopsy. Cysto showed some BPH as well. Path did show prostate cancer. Path was [place] 7 and 8. His brother had prostate cancer. Summary of old records: N/A    Additional History: N/A    Procedures Today: N/A    Urinalysis today:  No results found for this visit on 12/06/22. Last several PSA's:  Lab Results   Component Value Date    PSA 3.78 08/08/2022    PSA 4.28 (H) 07/21/2022    PSA 2.45 07/14/2021     Last total testosterone:  No results found for: TESTOSTERONE    AUA Symptom Score (12/6/2022): Last BUN and creatinine:  Lab Results   Component Value Date    BUN 18 11/15/2022     Lab Results   Component Value Date    CREATININE 0.59 (L) 11/15/2022       Additional Lab/Culture results: none    Imaging Reviewed during this Office Visit: none  (results were independently reviewed by physician and radiology report verified)    PAST MEDICAL, FAMILY AND SOCIAL HISTORY UPDATE:  Past Medical History:   Diagnosis Date    Arthritis     right shoulder, knee    BPH (benign prostatic hyperplasia)     Caffeine use     2 cups coffee/day    Chronic back pain     Chronic lymphocytic leukemia ([address]     numbers are stable. [facility] Dr. Miguel Angel Corey.  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[address]     Sciatica     lower back    Vitamin D deficiency     Wears glasses      Past Surgical History:   Procedure Laterality Date    COLONOSCOPY      2 times    CYSTOSCOPY  11/28/2022    KNEE ARTHROSCOPY Bilateral     meniscus    LITHOTRIPSY      3 times    PROSTATE BIOPSY  11/28/2022    PROSTATE BIOPSY N/A 11/28/2022    CYSTOSCOPY,  PROSTATE BIOPSY ULTRASOUND performed by Kwame Jack MD at [address] [place] [address]     Family History   Problem Relation Age of Onset    Glaucoma Mother     Other Mother         tia    Dementia Father     Prostate Cancer Brother     Heart Attack Maternal Uncle      No outpatient medications have been marked as taking for the 12/6/22 encounter (Office Visit) with Kwame Jack MD.       Ciprofloxacin  Social History     Tobacco Use   Smoking Status Never   Smokeless Tobacco Never   Tobacco Comments    smoked a pipe about 1 year in his 19's     (Ifpatient a smoker, smoking cessation counseling offered)    Social History     Substance and Sexual Activity   Alcohol Use Yes    Comment: 2-3 times a week       REVIEW OF SYSTEMS:  Review of Systems    Physical Exam:      Vitals:    12/06/22 1122   Temp: 98.6 °F (37 °C)     Body mass index is 24.82 kg/m². Patient is a 67 y.o. male in no acute distress and alert and oriented to person, place and time. Physical Exam  Constitutional: Patient in no acute distress. Neuro: Alert and oriented to person, place and time. Psych: Mood normal, affect normal    Assessment and Plan      1. History of elevated PSA    2. Prostate cancer ([address]    3. BPH with obstruction/lower urinary tract symptoms       Cancer is new. Plan:         Doing well post biopsy. Path showed [place] 8 disease. Will obtain CT and bone scan. Likely will need radical prostatectomy. Return in about 2 weeks (around 12/20/2022). Prescriptions Ordered:  No orders of the defined types were placed in this encounter.     Orders Placed:  Orders Placed This Encounter   Procedures    CT PELVIS W CONTRAST Standing Status:   Future     Standing Expiration Date:   12/6/2023     Order Specific Question:   Additional Contrast?     Answer:   Oral     Order Specific Question:   STAT Creatinine as needed:     Answer:   No    NM BONE SCAN WHOLE BODY     Standing Status:   Future     Standing Expiration Date:   12/6/2023             Sandra Pedroza MD    Agree with the ROS entered by the MA.

## 2022-12-08 ENCOUNTER — HOSPITAL ENCOUNTER (OUTPATIENT)
Dept: NUCLEAR MEDICINE | Age: 72
Discharge: HOME | End: 2022-12-10
Payer: MEDICARE

## 2022-12-08 ENCOUNTER — HOSPITAL ENCOUNTER (OUTPATIENT)
Dept: CT IMAGING | Age: 72
Discharge: HOME | End: 2022-12-10
Payer: MEDICARE

## 2022-12-08 DIAGNOSIS — C61 PROSTATE CANCER (HCC): ICD-10-CM

## 2022-12-08 PROCEDURE — 78306 BONE IMAGING WHOLE BODY: CPT | Performed by: UROLOGY

## 2022-12-08 PROCEDURE — 2580000003 HC RX 258: Performed by: UROLOGY

## 2022-12-08 PROCEDURE — A9503 TC99M MEDRONATE: HCPCS | Performed by: UROLOGY

## 2022-12-08 PROCEDURE — 3430000000 HC RX DIAGNOSTIC RADIOPHARMACEUTICAL: Performed by: UROLOGY

## 2022-12-08 PROCEDURE — 72193 CT PELVIS W/DYE: CPT

## 2022-12-08 PROCEDURE — 6360000004 HC RX CONTRAST MEDICATION: Performed by: UROLOGY

## 2022-12-08 RX ORDER — 0.9 % SODIUM CHLORIDE 0.9 %
100 INTRAVENOUS SOLUTION INTRAVENOUS ONCE
Status: COMPLETED | OUTPATIENT
Start: 2022-12-08 | End: 2022-12-08

## 2022-12-08 RX ORDER — SODIUM CHLORIDE 0.9 % (FLUSH) 0.9 %
10 SYRINGE (ML) INJECTION PRN
Status: DISCONTINUED | OUTPATIENT
Start: 2022-12-08 | End: 2022-12-11 | Stop reason: HOSPADM

## 2022-12-08 RX ORDER — TC 99M MEDRONATE 20 MG/10ML
25 INJECTION, POWDER, LYOPHILIZED, FOR SOLUTION INTRAVENOUS
Status: COMPLETED | OUTPATIENT
Start: 2022-12-08 | End: 2022-12-08

## 2022-12-08 RX ADMIN — SODIUM CHLORIDE, PRESERVATIVE FREE 10 ML: 5 INJECTION INTRAVENOUS at 07:56

## 2022-12-08 RX ADMIN — IOPAMIDOL 75 ML: 755 INJECTION, SOLUTION INTRAVENOUS at 07:55

## 2022-12-08 RX ADMIN — SODIUM CHLORIDE, PRESERVATIVE FREE 10 ML: 5 INJECTION INTRAVENOUS at 07:36

## 2022-12-08 RX ADMIN — TC 99M MEDRONATE 26.4 MILLICURIE: 20 INJECTION, POWDER, LYOPHILIZED, FOR SOLUTION INTRAVENOUS at 07:36

## 2022-12-08 RX ADMIN — SODIUM CHLORIDE 100 ML: 9 INJECTION, SOLUTION INTRAVENOUS at 07:56

## 2022-12-21 ENCOUNTER — TELEPHONE (OUTPATIENT)
Dept: UROLOGY | Age: 72
End: 2022-12-21

## 2022-12-21 NOTE — TELEPHONE ENCOUNTER
Pt called into the office to get results of CT and bone scan-adv patient that he was scheduled to come in for those results. Patient stated \" That's pathetic, I have them on my chart. Writer adv a message would be sent to the NP and I would f/u with him. Patient verbalized understanding, call was ended.

## 2023-01-06 ENCOUNTER — TELEPHONE (OUTPATIENT)
Dept: UROLOGY | Age: 73
End: 2023-01-06

## 2023-01-06 NOTE — TELEPHONE ENCOUNTER
Patient called in and stated \"I just want to thank you guvanessa for sending over my records. I am also eagerly waiting on if surgery will be scheduled. \"    Writer let patient know that Radha Robert is back in office today. We will speak with him and follow up.     Verbalized understanding

## 2023-01-12 ENCOUNTER — TELEPHONE (OUTPATIENT)
Dept: UROLOGY | Age: 73
End: 2023-01-12

## 2023-01-12 ENCOUNTER — OFFICE VISIT (OUTPATIENT)
Dept: UROLOGY | Age: 73
End: 2023-01-12
Payer: MEDICARE

## 2023-01-12 VITALS
BODY MASS INDEX: 24.77 KG/M2 | SYSTOLIC BLOOD PRESSURE: 132 MMHG | TEMPERATURE: 98 F | RESPIRATION RATE: 16 BRPM | WEIGHT: 173 LBS | HEIGHT: 70 IN | DIASTOLIC BLOOD PRESSURE: 78 MMHG | HEART RATE: 79 BPM

## 2023-01-12 DIAGNOSIS — C61 PROSTATE CANCER (HCC): Primary | ICD-10-CM

## 2023-01-12 PROCEDURE — 1123F ACP DISCUSS/DSCN MKR DOCD: CPT | Performed by: UROLOGY

## 2023-01-12 PROCEDURE — 3078F DIAST BP <80 MM HG: CPT | Performed by: UROLOGY

## 2023-01-12 PROCEDURE — G8427 DOCREV CUR MEDS BY ELIG CLIN: HCPCS | Performed by: UROLOGY

## 2023-01-12 PROCEDURE — G8484 FLU IMMUNIZE NO ADMIN: HCPCS | Performed by: UROLOGY

## 2023-01-12 PROCEDURE — 3075F SYST BP GE 130 - 139MM HG: CPT | Performed by: UROLOGY

## 2023-01-12 PROCEDURE — 1036F TOBACCO NON-USER: CPT | Performed by: UROLOGY

## 2023-01-12 PROCEDURE — G8420 CALC BMI NORM PARAMETERS: HCPCS | Performed by: UROLOGY

## 2023-01-12 PROCEDURE — 99215 OFFICE O/P EST HI 40 MIN: CPT | Performed by: UROLOGY

## 2023-01-12 PROCEDURE — 3017F COLORECTAL CA SCREEN DOC REV: CPT | Performed by: UROLOGY

## 2023-01-12 ASSESSMENT — ENCOUNTER SYMPTOMS
SHORTNESS OF BREATH: 0
COUGH: 0
ABDOMINAL PAIN: 0
NAUSEA: 0
EYE PAIN: 0
VOMITING: 0
BACK PAIN: 0
DIARRHEA: 0
CONSTIPATION: 0
WHEEZING: 0

## 2023-01-12 NOTE — TELEPHONE ENCOUNTER
Robotic prostatectomy w/ PLND @ [facility] 1/31/23 8:00am **STOP BLOOD THINNERS 1/24/23**   PAT @ [address] [place] [address] 1/18/23 12:30pm   Preteaching done 1/12/23 in office   Cystogram @ [address] [place] [address] 2/7/23 10:00am   Post-op @ office 2/7/23 11:00am         Spoke with patient procedure info given to patient.

## 2023-01-12 NOTE — PROGRESS NOTES
Review of Systems   Constitutional:  Negative for appetite change, chills, fatigue and fever. Eyes:  Negative for pain and visual disturbance. Respiratory:  Negative for cough, shortness of breath and wheezing. Cardiovascular:  Negative for chest pain and leg swelling. Gastrointestinal:  Negative for abdominal pain, constipation, diarrhea, nausea and vomiting. Genitourinary:  Negative for difficulty urinating, dysuria, frequency, hematuria, penile pain and testicular pain. Musculoskeletal:  Negative for back pain and myalgias. Neurological:  Negative for dizziness, tremors, weakness, light-headedness, numbness and headaches. Hematological:  Negative for adenopathy. Does not bruise/bleed easily.  None

## 2023-01-12 NOTE — PROGRESS NOTES
[address] [address]  Dept: [address] [facility] Urology Office Note - Established    Patient:  Javi Sanabria  YOB: 1950  Date: 1/12/2023    The patient is a 67 y.o. male who presents todayfor evaluation of the following problems:   Chief Complaint   Patient presents with    Results     Discuss bone scan /ct . Discuss surgery        HPI  Patient is presenting for f/u prostate ca. Has raj 7 and 8 disease. CT and bone scan neg for mets. Here to discuss treatment- cares for his bed bound mother. Summary of old records: N/A    Additional History: N/A    Procedures Today: N/A    Urinalysis today:  No results found for this visit on 01/12/23. Last several PSA's:  Lab Results   Component Value Date    PSA 3.78 08/08/2022    PSA 4.28 (H) 07/21/2022    PSA 2.45 07/14/2021     Last total testosterone:  No results found for: TESTOSTERONE    AUA Symptom Score (1/12/2023):   INCOMPLETE EMPTYING: How often have you had the sensation of not emptying your bladder?: Not at all  FREQUENCY: How often do you have to urinate less than every two hours?: Not at all  INTERMITTENCY: How often have you found you stopped and started again several times when you urinated?: Not at all  URGENCY: How often have you found it difficult to postpone urination?: Not at all  WEAK STREAM: How often have you had a weak urinary stream?: Not at all  STRAINING: How often have you had to strain to start  urination?: Not at all  NOCTURIA: How many times did you typically get up at night to uriniate?: NONE  TOTAL I-PSS SCORE[de-identified] 0       Last BUN and creatinine:  Lab Results   Component Value Date    BUN 18 11/15/2022     Lab Results   Component Value Date    CREATININE 0.59 (L) 11/15/2022       Additional Lab/Culture results: none    Imaging Reviewed during this Office Visit: none  (results were independently reviewed by physician and radiology report verified)    PAST MEDICAL, FAMILY AND SOCIAL HISTORY UPDATE:  Past Medical History:   Diagnosis Date    Arthritis     right shoulder, knee    BPH (benign prostatic hyperplasia)     Caffeine use     2 cups coffee/day    Chronic back pain     Chronic lymphocytic leukemia ([address]     numbers are stable. [facility] Dr. Rana Schlatter.  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[address]     Sciatica     lower back    Vitamin D deficiency     Wears glasses      Past Surgical History:   Procedure Laterality Date    COLONOSCOPY      2 times    CYSTOSCOPY  11/28/2022    KNEE ARTHROSCOPY Bilateral     meniscus    LITHOTRIPSY      3 times    PROSTATE BIOPSY  11/28/2022    PROSTATE BIOPSY N/A 11/28/2022    CYSTOSCOPY,  PROSTATE BIOPSY ULTRASOUND performed by Aldo Griffiths MD at [facility]     Family History   Problem Relation Age of Onset    Glaucoma Mother     Other Mother         tia    Dementia Father     Prostate Cancer Brother     Heart Attack Maternal Uncle      Outpatient Medications Marked as Taking for the 1/12/23 encounter (Office Visit) with Kavita Magallanes MD   Medication Sig Dispense Refill    azithromycin (ZITHROMAX) 250 MG tablet take 2 tablets by mouth TODAY then take 1 tablet DAILY FOR 4 DAYS      polyethylene glycol-electrolytes (NULYTELY) 420 g solution       ibuprofen (ADVIL;MOTRIN) 800 MG tablet Take 800 mg by mouth three times daily      diclofenac sodium (VOLTAREN) 1 % GEL Apply 1 g topically 2 times daily      tamsulosin (FLOMAX) 0.4 MG capsule Take 1 capsule by mouth in the morning and at bedtime 180 capsule 5    irbesartan (AVAPRO) 150 MG tablet Take 1 tablet by mouth daily 30 tablet 11    dutasteride (AVODART) 0.5 MG capsule take 1 capsule by mouth once daily 90 capsule 3    simvastatin (ZOCOR) 20 MG tablet Take 1 tablet by mouth nightly 90 tablet 3    CLARITIN-D 24 HOUR  MG per extended release tablet Take 1 tablet by mouth daily 301 tablet 11    Cholecalciferol 2000 units TABS Take 2,000 Units by mouth daily D3      Glucosamine HCl--250 MG TABS Take 2 tablets by mouth daily Move Free      pyridostigmine (MESTINON) 60 MG tablet Take 60 mg by mouth 4 times daily          Ciprofloxacin  Social History     Tobacco Use   Smoking Status Never   Smokeless Tobacco Never   Tobacco Comments    smoked a pipe about 1 year in his 19's     (Ifpatient a smoker, smoking cessation counseling offered)    Social History     Substance and Sexual Activity   Alcohol Use Yes    Comment: 2-3 times a week       REVIEW OF SYSTEMS:  Review of Systems    Physical Exam:      Vitals:    01/12/23 0933   BP: 132/78   Pulse: 79   Resp: 16   Temp: 98 °F (36.7 °C)     Body mass index is 24.82 kg/m². Patient is a 67 y.o. male in no acute distress and alert and oriented to person, place and time. Physical Exam  Constitutional: Patient in no acute distress. Neuro: Alert and oriented to person, place and time. Psych: Mood normal, affect normal  Skin: No rash noted  HEENT: Head: Normocephalic andatraumatic  Conjunctivae and EOM are normal. Pupils are equal, round  Nose:Normal  Right External Ear: Normal; Left External Ear: Normal  Mouth: Mucosa Moist  Neck: Supple  Lungs: Respiratory effort is normal  Cardiovascular: Warm & Pink  Abdomen: Soft, non-tender, non-distended with no CVA,  No flank tenderness,  Or hepatosplenomegaly       Assessment and Plan      1. Prostate cancer [facility])           Plan:     Robotic laparoscopic radical prostatectomy with PLND  Disease primarily right-side. Cat 1  Do mod standard on right and standard on left and nodes  D/C SAME DAY  This is invasive surgery which risks discussed. Thoroughly reviewed activity restriction with caring for mother- agreeable. Return for Surgery. Prescriptions Ordered:  No orders of the defined types were placed in this encounter. Orders Placed:  No orders of the defined types were placed in this encounter. Hood Harris MD    Agree with the ROS entered by the MA.

## 2023-01-17 PROBLEM — Z01.818 PREOP EXAMINATION: Status: ACTIVE | Noted: 2023-01-17

## 2023-01-17 NOTE — H&P
HISTORY and [address] [address]       NAME:  Brice Fleming  MRN: [id number]   YOB: 1950   Date: 1/18/2023   Age: 67 y.o. Gender: male     COMPLAINT AND PRESENT HISTORY:   Brice Fleming is 67 y.o.,  male, presents for pre-anesthesia/admission testing for PROSTATECTOMY LAPAROSCOPIC ROBOTIC XI WITH PELVIC LYMPH NODE DISSECTION per Dr. Mark Romero. Primary dx: PROSTATE CANCER. HPI:  SEE PORTION OF NOTE BELOW PER DR. STEVENS, 1-12-23 (REVIEWED):  \"The patient is a 67 y.o. male who presents todayfor evaluation of the following problems:        Chief Complaint   Patient presents with    Results       Discuss bone scan /ct . Discuss surgery          HPI  Patient is presenting for f/u prostate ca. Has raj 7 and 8 disease. CT and bone scan neg for mets. Here to discuss treatment- cares for his bed bound mother. Plan:     Robotic laparoscopic radical prostatectomy with PLND  Disease primarily right-side. Cat 1  Do mod standard on right and standard on left and nodes  D/C SAME DAY  This is invasive surgery which risks discussed. Thoroughly reviewed activity restriction with caring for mother- agreeable. \"    Update: Patient states that prostate was initially dx'd November/December 2022 r/t PSA- states was following w/Dr. Alden Montanez prior to this- PSA increased to \"1 to 4\". States he was given the option of radiation or surgery, he has chosen surgery. States he is not urinating any more frequently than his normal, denies dysuria, denies any visible hematuria, denies flank pain, denies pelvic pain, denies fever/chills, denies nausea/vomiting. States that his brother had hx of prostate CA, but states was exposed to agent orange exposure. He states that his brother's prostate was removed d/t advanced stage (\"level 3\"). Completed CYSTOSCOPY, PROSTATE BIOPSY ULTRASOUND 11-28-22.   Lab Results   Component Value Date    PSA 3.78 08/08/2022    PSA 4.28 (H) 07/21/2022    PSA 2.45 07/14/2021 RECENT IMAGING R/T HPI     Narrative   EXAMINATION:   MULTIPARAMETRIC MRI OF THE PROSTATE WITH AND WITHOUT CONTRAST       9/20/2022:       TECHNIQUE:   Multiparametric imaging with dynamic contrast enhanced imaging and diffusion   weighted imaging was performed. COMPARISON:   No priors       HISTORY:   ORDERING SYSTEM PROVIDED HISTORY: History of elevated PSA   TECHNOLOGIST PROVIDED HISTORY:   STAT Creatinine as needed:->No   Reason for Exam: History of elevated PSA       FINDINGS:   Prostate:  4.0 x 4.5 x 5.1 cm. Estimated volume 48 cc. PERIPHERAL ZONE:       Mostly diffusely T2 hyperintense except toward the base where it is more   hypointense. No abnormal ADC or DWI signal.  Overall score PI-RADS 2. TRANSITION ZONE: Enlarged and heterogeneous with multiple nodules, consistent   with BPH. A 2.7 cm BPH nodule projects into the bladder base. No   significant restricted diffusion or abnormal enhancement. SEMINAL VESICLES:       Somewhat smaller size with some areas showing decreased signal intensity may   reflect chronic inflammation. Neurovascular bundle:  Unremarkable. Lymphadenopathy:  No evidence of lymphadenopathy. Bladder:  Urinary bladder is mostly collapsed. No suspicious lesions. Bowel:  Extensive sigmoid diverticulosis. Peritoneal cavity:   Nonspecific small volume pelvic free fluid. May reflect   occult infectious or inflammatory process. Consider CT abdomen pelvis. Bones/soft tissues:   Normal bone marrow signal intensity. No suspicious or   aggressive osseous lesions. Impression   1. BPH. A 2.7 cm nodule projects into the bladder base. 2. Clinically significant cancer is unlikely to be present in the peripheral   zone. PI-RADS 2 as discussed above. 3. Pelvic free fluid. Nonspecific. Could be related to occult   infectious/inflammatory process or malignancy.   Consider CT abdomen pelvis   with contrast for further evaluation. Narrative   EXAMINATION:   CT OF THE PELVIS WITH CONTRAST 12/8/2022 7:50 am       TECHNIQUE:   CT of the pelvis was performed with the administration of intravenous   contrast. Multiplanar reformatted images are provided for review. Automated   exposure control, iterative reconstruction, and/or weight based adjustment of   the mA/kV was utilized to reduce the radiation dose to as low as reasonably   achievable. COMPARISON:   MR dated 09/20/2022       HISTORY:   ORDERING SYSTEM PROVIDED HISTORY: Prostate cancer [facility])   TECHNOLOGIST PROVIDED HISTORY:       STAT Creatinine as needed:->No   Reason for Exam: new finding of spot on prostate       FINDINGS:   Heterogeneous enlargement of the prostate with dystrophic calcification. There is posterior impression identified the base of the bladder. The   bladder is incompletely distended. No gross mass or lesion. There is no   evidence of pelvic adenopathy. No significant atherosclerotic disease seen   within the pelvic vessels. Only minimal atherosclerotic disease within the   common iliac arteries bilaterally. Pelvic portions of the gastrointestinal tract reveal extensive diverticulosis   of the colon. Minimal wall thickening identified of the sigmoid colon to   suggest chronic inflammation. No significant surrounding stranding. No   abnormal mass or fluid collections seen within the pelvis. Trace fluid   identified within the pelvis. Small bilateral inguinal hernias containing fat only. Bony structures reveal degenerative changes seen within the spine. There is   no aggressive osseous abnormality present. Degenerative changes seen within   the hips. Impression   There is heterogeneous enlargement of the prostate with posterior impression   identified of the base of the bladder. There is incomplete distension of the   bladder with no significant wall thickening.        Trace fluid identified within the pelvis with extensive diverticulosis and   mild wall thickening of the sigmoid colon to suggest chronic inflammation. No significant surrounding stranding to suggest an acute process. Narrative   EXAMINATION:   WHOLE BODY BONE SCAN  12/8/2022       TECHNIQUE:   The patient was injected intravenously with 26.4 mCi of 99 mTc MDP and   scintigraphy of the entire skeleton was performed approximately three hours   later. COMPARISON:   No prior bone scintigraphy for comparison       HISTORY:   ORDERING SYSTEM PROVIDED HISTORY: Prostate cancer [facility])   TECHNOLOGIST PROVIDED HISTORY:   Reason for Exam: Prostate cancer ([facility] [address]   Additional signs and symptoms: prostate biospy last week, arthritis, chronic   lymphocytic leukemia x 2006       FINDINGS:   There is mild diffuse radiotracer activity in the spine which is likely   degenerative. Uptake in bilateral shoulders, sternoclavicular joints,   elbows, wrists, hands, hips, and knees are most likely degenerative related   as well. Asymmetric increased uptake is noted in the right greater trochanter which   could be degenerative or inflammatory. Questionable faint focal uptake is   noted in the posterior left 5th rib. Attention on follow-up is recommended. Otherwise, there is no suspicious focus of abnormal increased radiotracer   uptake in the axial or visualized appendicular skeleton. Physiologic activity is present in the kidneys and urinary bladder. Impression   Asymmetric increased uptake noted in the right greater trochanter could be   degenerative or inflammatory. Clinical correlation is recommended. Otherwise, there is no definite scintigraphic evidence to suggest   osteoblastic metastatic disease.          SURGICAL PATHOLOGY REPORT  Order: [id number]  Status: Final result    Visible to patient: Yes (seen)    Next appt: 02/07/2023 at 10:00 AM in Radiology [facility] ROOM 1)    0 Result Notes  Component 11/28/22 0759    Surgical Pathology Report -- Diagnosis --   A. PROSTATE, LB, NEEDLE CORE BIOPSY:        -  BENIGN PROSTATIC TISSUE. B.  PROSTATE, LLB, NEEDLE CORE BIOPSY:        -  BENIGN PROSTATIC TISSUE. C.  PROSTATE, LM, NEEDLE CORE BIOPSY:        -  BENIGN PROSTATIC TISSUE. D.  PROSTATE, LLM, NEEDLE CORE BIOPSY:        -  BENIGN PROSTATIC TISSUE. E.  PROSTATE, LA, NEEDLE CORE BIOPSY:        -  BENIGN PROSTATIC TISSUE. F.  PROSTATE, LLA, NEEDLE CORE BIOPSY:        -  BENIGN PROSTATIC TISSUE. G.  PROSTATE, RB, NEEDLE CORE BIOPSY:        -  BENIGN PROSTATIC TISSUE. H.  PROSTATE, RLB, NEEDLE CORE BIOPSY:        -  BENIGN PROSTATIC TISSUE. I.  PROSTATE, RM, NEEDLE CORE BIOPSY:             -  ADENOCARCINOMA, QUINN SCORE 4+3 = 7 (WHO/ISUP GRADE   GROUP 3), INVOLVING 1 OF 1 CORE, 0.6 MM CONTIGUOUS LENGTH, 4% OF TOTAL   BIOPSY VOLUME. J.  PROSTATE, RLM, NEEDLE CORE BIOPSY:             -  ADENOCARCINOMA, QUINN SCORE 3+4= 7 (WHO/ISUP GRADE   GROUP 2), INVOLVING 1 OF 1 CORE, 2.4 MM DISCONTIGUOUS LENGTH, 14% OF   TOTAL BIOPSY VOLUME.     K.  PROSTATE, RA, NEEDLE CORE BIOPSY:             -  ADENOCARCINOMA, QUINN SCORE 4+3 = 7 (WHO/ISUP GRADE   GROUP 3), INVOLVING 1 OF 1 CORE, 5.0 MM DISCONTIGUOUS LENGTH, 31% OF   TOTAL BIOPSY VOLUME. L.  PROSTATE, RLA, NEEDLE CORE BIOPSY:             -  ADENOCARCINOMA, QUINN SCORE 4+4 = 8 (WHO/ISUP GRADE   GROUP 4), INVOLVING 1 OF 1 CORE, 2.4 MM CONTIGUOUS LENGTH, 16% OF   TOTAL BIOPSY VOLUME. Gwen Liriano M.D.   **Electronically Signed Out**         jet/11/30/2022         Review of additional significant medical hx:  CHRONIC LYMPHOCYTIC LEUKEMIA: Patient states that his numbers are stable, most recently followed with Dr. Hyacinth Alatorre at [facility] [address] (last OV noted 10-30-20). States that his prior local oncologist passes away unexpectedly, started following w/Dr. Hyacinth Alatorre after this (OV note placed on surgery chart- see Care Everywhere for further detail).  Was told per specialist that when he has annual physical, requested PCP send him copies of lab work. Denies any prior hx of chemo/radiation. COVID-19: States x3 days had \"bad flu\"- felt \"normal\" by day 5. OCULAR MYASTHENIA GRAVIS: States he noted blurry vision with reading 12-15 years ago, he was referred to a neuro ophthalmologist- was started on Mestinon, which cleared s/s within 24 hours- still takes 4 times/day. He will be following w/new specialist at [facility] [place] in the future. HLD, HTN: States BP is typically stable, takes all medications as rx'd. Denies current/recent chest pain, palpitations, SOB, + dizziness (at times, states r/t low blood sugar- states PCP is aware), denies leg swelling, denies persistent headaches. Current medications r/t condition: AVAPRO, SIMVASTATIN  BP Readings from Last 3 Encounters:   01/18/23 111/73   01/12/23 132/78   11/28/22 127/78      HYPOGLYCEMIA: States occurs sporadically, has to be careful w/Latter-day fasting. States he will feel a little lightheaded, eats some carbs to help. He is not diabetic. Denies any syncope hx. KIDNEY STONES: States s/s improved w/decreased sodium intake- has not had a kidney stone in about a year- has needed surgical intervention. Activity level: Patient states that he does power walk, depending on the weather. He does take care of his mother, states he is primary caregiver. He does try to walk 3 miles/day, he is also a vegetarian for x2 years. Functional Capacity per patient:              1. Patient is able to walk 2 city blocks on level ground without SOB. 2. Patient is able to climb 2 flights of stairs without SOB. Denies hx of MRSA infection. Denies hx of blood clots. Denies hx of any personal or family hx of complications w/anesthesia.    PAST MEDICAL HISTORY     Past Medical History:   Diagnosis Date    Arthritis     right shoulder, knee    BPH (benign prostatic hyperplasia)     Caffeine use     2 cups coffee/day    Chronic back pain     Chronic lymphocytic leukemia ([address]     numbers are stable. [facility] Dr. José Keenan. last appt 2020    COVID-19 10/22/2022    body aches, sinus congestion x 3 days, felt \"normal\" by day 5    Elevated PSA     History of myasthenia gravis     occular    Hyperlipidemia     Hypertension     Hypoglycemia     Kidney stones     Lightheadedness     Patient states dizziness/lightheadedness w/hypoglycemia    Ocular myasthenia gravis ([address]     Prostate cancer ([address] 12/2022    PVC's (premature ventricular contractions)     Sciatica     lower back    Vitamin D deficiency     Wears glasses        SURGICAL HISTORY       Past Surgical History:   Procedure Laterality Date    COLONOSCOPY      2 times    CYSTOSCOPY  11/28/2022    EXTRACORPOREAL SHOCK WAVE LITHOTRIPSY  06/20/2016    Procedure: EXTRACORPOREAL SHOCK WAVE LITHOTRIPSY, right needs KUB; Surgeon: Veto Delgadillo MD; [facility]; Service: Urology Surgery; Laterality: Right; MR    EYE SURGERY      30 years ago laser to close holes in retina    KNEE ARTHROSCOPY Bilateral     meniscus    LITHOTRIPSY      3 times    PROSTATE BIOPSY  11/28/2022    PROSTATE BIOPSY N/A 11/28/2022    CYSTOSCOPY,  PROSTATE BIOPSY ULTRASOUND performed by Antonette Grey MD at [facility]       Social History     Socioeconomic History    Marital status: Single     Spouse name: None    Number of children: None    Years of education: None    Highest education level: None   Tobacco Use    Smoking status: Former     Types: Pipe, Cigarettes, Cigars    Smokeless tobacco: Never    Tobacco comments:     Smoked a pipe about 1 year in his 19's, also smoked cigarettes in college, smoked x1 cigar in 1992 (quit smoking in 1992).    Vaping Use    Vaping Use: Never used   Substance and Sexual Activity    Alcohol use: Not Currently     Alcohol/week: 3.0 standard drinks     Types: 3 Standard drinks or equivalent per week     Comment: 2-3 times a week glass of wine, once in a while a mixed drink    Drug use: Never     Social Determinants of Health     Physical Activity: Sufficiently Active    Days of Exercise per Week: 3 days    Minutes of Exercise per Session: 60 min       REVIEW OF SYSTEMS      Allergies   Allergen Reactions    Ciprofloxacin Other (See Comments)     Chronic lymph leukemia, , occular miastenia gravis and cipro make it worse         Current Outpatient Medications on File Prior to Encounter   Medication Sig Dispense Refill    acetaminophen (TYLENOL) 500 MG tablet Take 1,000 mg by mouth every 6 hours as needed for Pain      tamsulosin (FLOMAX) 0.4 MG capsule Take 1 capsule by mouth in the morning and at bedtime 180 capsule 5    irbesartan (AVAPRO) 150 MG tablet Take 1 tablet by mouth daily 30 tablet 11    dutasteride (AVODART) 0.5 MG capsule take 1 capsule by mouth once daily 90 capsule 3    simvastatin (ZOCOR) 20 MG tablet Take 1 tablet by mouth nightly 90 tablet 3    CLARITIN-D 24 HOUR  MG per extended release tablet Take 1 tablet by mouth daily 301 tablet 11    Cholecalciferol 2000 units TABS Take 2,000 Units by mouth daily D3      Glucosamine HCl--250 MG TABS Take 2 tablets by mouth daily Move Free      pyridostigmine (MESTINON) 60 MG tablet Take 60 mg by mouth 4 times daily        No current facility-administered medications on file prior to encounter. Review of Systems   Constitutional:  Negative for chills and fever. HENT:  Negative for congestion (Takes Claritin-D- hx of congestion), ear pain, rhinorrhea, sore throat and trouble swallowing. Respiratory:  Negative for apnea, cough, shortness of breath and wheezing. Cardiovascular:  Negative for chest pain, palpitations and leg swelling. Gastrointestinal:  Negative for abdominal pain, anal bleeding, blood in stool, constipation, diarrhea, nausea and vomiting. Genitourinary:         See HPI.    Musculoskeletal:         States \"bone on bone\" to right hip area, effects pain radiating down RLE. Skin:  Positive for wound (Cut under right thumb, no bleeding). Negative for rash. Allergic/Immunologic: Positive for environmental allergies. Neurological:  Positive for dizziness (Hx of with hypoglycemia, denies currently) and light-headedness (Hx of with hypoglycemia, no complaints currently). Negative for syncope and headaches. Hematological:  Does not bruise/bleed easily. GENERAL PHYSICAL EXAM     Vitals: /73   Pulse 60 Comment: Per auscultation  Temp 97.5 °F (36.4 °C) (Infrared)   Resp 16   Ht 5' 10.5\" (1.791 m)   Wt 170 lb (77.1 kg)   SpO2 100%   BMI 24.05 kg/m²               Physical Exam  Vitals reviewed. Constitutional:       General: He is not in acute distress. Appearance: He is well-developed. He is not ill-appearing, toxic-appearing or diaphoretic. HENT:      Head: Normocephalic. Right Ear: External ear normal.      Left Ear: External ear normal.      Nose: Nose normal.      Mouth/Throat:      Pharynx: No oropharyngeal exudate or posterior oropharyngeal erythema. Tonsils: No tonsillar abscesses. Eyes:      General:         Right eye: No discharge. Left eye: No discharge. Conjunctiva/sclera: Conjunctivae normal.      Pupils: Pupils are equal, round, and reactive to light. Comments: + glasses. Cardiovascular:      Rate and Rhythm: Normal rate and regular rhythm. Pulses: Intact distal pulses. Heart sounds: Normal heart sounds. Comments: Overall rhythm is regular w/isolated, occasional irregular beats- consistent w/PVC's noted on EKG 11-15-22. Pulmonary:      Effort: Pulmonary effort is normal. No accessory muscle usage or respiratory distress. Breath sounds: Normal breath sounds. No decreased breath sounds, wheezing, rhonchi or rales. Abdominal:      General: Bowel sounds are normal. There is no distension. Palpations: Abdomen is soft.  There is no mass.      Tenderness: There is no abdominal tenderness. There is no right CVA tenderness, left CVA tenderness, guarding or rebound. Musculoskeletal:      Right lower leg: No swelling or tenderness. No edema. Left lower leg: No swelling or tenderness. No edema. Comments: Negative Shelia's sign b/l (performed in sitting position). Lymphadenopathy:      Cervical: No cervical adenopathy. Skin:     General: Skin is warm and dry. Findings: Erythema (Mild erythema inbetween eye brows, forehead region- patient states dry skin, uses medicated lotion) present. Comments: Superficial cut to right thumb nail bed area, scabbing noted, no active bleeding, no surrounding erythema. Neurological:      Mental Status: He is alert and oriented to person, place, and time.    Psychiatric:         Behavior: Behavior normal.       LAB REVIEW     Lab Results   Component Value Date     01/18/2023    K 5.0 01/18/2023     01/18/2023    CO2 28 01/18/2023    BUN 11 01/18/2023    CREATININE 0.64 (L) 01/18/2023    GLUCOSE 89 01/18/2023    CALCIUM 9.1 01/18/2023    LABGLOM >60 01/18/2023    GFRAA >60 10/25/2019     Lab Results   Component Value Date    WBC 25.2 (H) 01/18/2023    HGB 13.8 01/18/2023    HCT 42.4 01/18/2023    MCV 97.0 01/18/2023     01/18/2023     Contains abnormal data CBC with Auto Differential  Order: [id number]  Status: Final result    Visible to patient: Yes (not seen)    Next appt: 02/07/2023 at 10:00 AM in Radiology [facility] ROOM 1)    0 Result Notes  Component Ref Range & Units 1/18/23 1300 11/15/22 1446 8/8/22 10/25/19 1535   WBC 3.5 - 11.0 k/uL 25.2 High     R  26.1 High     RBC 4.5 - 5.9 m/uL 4.37 Low     R  4.49 Low     Hemoglobin 13.5 - 17.5 g/dL 13.8  12.8 Low  R   R  13.8    Hematocrit 41 - 53 % 42.4  39.9 Low  R   R  41.9    MCV 80 - 100 fL 97.0    R  93.4    MCH 26 - 34 pg 31.5    R  30.8    MCHC 31 - 37 g/dL 32.5    R  33.0    RDW 11.5 - 14.9 % 13.9    R  14.0    Platelets 150 - 450 k/uL 162    R  170    MPV 6.0 - 12.0 fL 8.7    R  9.2    Seg Neutrophils 36 - 66 % 16 Low     13 Low     Lymphocytes 24 - 44 % 81 High     82 High     Monocytes 1 - 7 % 3    3    Eosinophils % 0 - 4 % 0    0    Basophils 0 - 2 % 0    0    Segs Absolute 1.3 - 9.1 k/uL 4.03    3.39    Absolute Lymph # 1.0 - 4.8 k/uL 20.41 High     21.41 High     Absolute Mono # 0.1 - 1.3 k/uL 0.76    0.78    Absolute Eos # 0.0 - 0.4 k/uL 0.00    0.00    Basophils Absolute 0.0 - 0.2 k/uL 0.00    R  0.00    Morphology  SMUDGE CELLS PRESENT    Normal    Neutrophils %         NRBC Automated     NOT REPORTED R    Differential Type     NOT REPORTED    Immature Granulocytes     NOT REPORTED R    Absolute Immature Granulocyte     NOT REPORTED R    WBC Morphology     NOT REPORTED    RBC Morphology     NOT REPORTED    Platelet Estimate     NOT REPORTED    Atypical Lymphocytes     2 R    Atypical Lymphocytes Absolute     0.52 R    Lymphocytes %         Monocytes %         Eosinophils %         Basophils %         Neutrophils Absolute         Lymphocytes Absolute         Monocytes Absolute         Eosinophils Absolute         Resulting [place] [id number]  [address] [facility]              Specimen Collected: 01/18/23 13:00 EST Last Resulted: 01/18/23 14:35 EST           EKG REVIEW, DATE: 11-15-22     Narrative & Impression    Sinus rhythm with occasional Premature ventricular complexes  Otherwise normal ECG  No previous ECGs available      Specimen Collected: 11/15/22 15:22 EST Last Resulted: 11/16/22 08:11 EST        SURGERY / PROVISIONAL DIAGNOSES:      PROSTATECTOMY LAPAROSCOPIC ROBOTIC XI WITH PELVIC LYMPH NODE DISSECTION    PROSTATE CANCER    Patient Active Problem List    Diagnosis Date Noted    Preop examination 01/17/2023    Spondylosis without myelopathy or radiculopathy, sacral and sacrococcygeal region 11/08/2022    History of myasthenia gravis 10/04/2022    Acquired spondylolisthesis 08/25/2022    Degeneration of lumbar intervertebral disc 08/25/2022    Dermatochalasis of left upper eyelid 08/25/2022    Dermatochalasis of right upper eyelid 08/25/2022    Essential hypertension 08/25/2022    Hyperglycemia 08/25/2022    Hyperlipidemia 08/25/2022    Lesion of ulnar nerve 08/25/2022    Localized, primary osteoarthritis of shoulder region 08/25/2022    Lower urinary tract symptoms due to benign prostatic hyperplasia 08/25/2022    Lumbosacral spondylosis without myelopathy 08/25/2022    Nuclear sclerosis of both eyes 08/25/2022    Nuclear senile cataract 08/25/2022    Osteoarthritis of knee 08/25/2022    Preglaucoma, unspecified, bilateral 08/25/2022    Rupture of right rotator cuff 08/25/2022    Tear of lateral meniscus of right knee, current 08/25/2022    Other specified enthesopathies of right lower limb, excluding foot 07/12/2022    Pes anserinus tendinitis 07/11/2022    Pain in right leg 05/11/2022    Unilateral primary osteoarthritis, right hip 04/07/2022    Unsp athscl native arteries of extremities, right leg ([facility] [address] 04/07/2022    Primary osteoarthritis, left hand 03/03/2022    Other specified arthritis, unspecified hand 03/03/2022    Pain in right knee 11/22/2021    Unilateral primary osteoarthritis, right knee 11/22/2021    Herpes zoster 03/09/2020    Chronic low back pain 02/14/2020    Chronic lymphocytic leukemia ([facility] [address] 02/22/2016    Calculus of kidney 01/14/2015    Hypertrophy of prostate with urinary obstruction and other lower urinary tract symptoms (LUTS) 01/14/2015    Microscopic hematuria 01/12/2015     Multiple problems were reconciled today from outside sources (see updated \"Patient Active Problem List\" above). See Care Everywhere for additional detail. Note: Patient questioning when he should stop Avodart, Flomax prior to surgery- advised to call Dr. Deniz Llanes office for clarification.     EKG noted above, CBC, BMP completed today has been previewed per myself. CLEARANCE: Medical clearance requested per surgeon (patient stated). Based on my personal evaluation of patient including review of patient's chart, medical clearance will be required for scheduled surgery d/t the following issues which are discussed in H&P above (also discussed w/anesthesia, Dr. Sid Stratton- per Dr. Sid Stratton, up to surgeon if they would like HemOc clearance, order given to re-check potassium in pre-op (K+ today is 5.0, specimen slightly hemolyzed), day of surgery):  - CHRONIC LYMPHOCYTIC LEUKEMIA (WBC- 25.2, 1-18-23)  - HX OF OCULAR MYASTHENIA GRAVIS  - HX OF HTN, HLD  - HX OF HYPOGLYCEMIA, DIZZINESS/LIGHTHEADEDNESS ASSOCIATED W/HYPOGLYCEMIA  - EKG RESULT, 11-15-22: \"SINUS RHYTHM WITH OCCASIONAL Janas Sprout / Estefania Arnoldo NORMAL ECG / Farzana Suzanne"  Dr. Leora Ryder office who will be responsible for making sure the clearance is obtained and is in the chart for [email or web address]     Total time spent on encounter- PAT provider minutes: 31-40 minutes     Nancy Burrows, APRN - CNP on 1/18/2023 at 3:38 PM

## 2023-01-17 NOTE — H&P (VIEW-ONLY)
HISTORY and [place] [facility] [id number]       NAME:  Keo Pham  MRN: [id number]   YOB: 1950   Date: 1/18/2023   Age: 67 y.o. Gender: male     COMPLAINT AND PRESENT HISTORY:   Keo Pham is 67 y.o.,  male, presents for pre-anesthesia/admission testing for PROSTATECTOMY LAPAROSCOPIC ROBOTIC XI WITH PELVIC LYMPH NODE DISSECTION per Dr. Jose Mclean. Primary dx: PROSTATE CANCER. HPI:  SEE PORTION OF NOTE BELOW PER DR. STEEVNS, 1-12-23 (REVIEWED):  \"The patient is a 67 y.o. male who presents todayfor evaluation of the following problems:        Chief Complaint   Patient presents with    Results       Discuss bone scan /ct . Discuss surgery          HPI  Patient is presenting for f/u prostate ca. Has raj 7 and 8 disease. CT and bone scan neg for mets. Here to discuss treatment- cares for his bed bound mother. Plan:     Robotic laparoscopic radical prostatectomy with PLND  Disease primarily right-side. Cat 1  Do mod standard on right and standard on left and nodes  D/C SAME DAY  This is invasive surgery which risks discussed. Thoroughly reviewed activity restriction with caring for mother- agreeable. \"    Update: Patient states that prostate was initially dx'd November/December 2022 r/t PSA- states was following w/Dr. De La Cruz Oas prior to this- PSA increased to \"1 to 4\". States he was given the option of radiation or surgery, he has chosen surgery. States he is not urinating any more frequently than his normal, denies dysuria, denies any visible hematuria, denies flank pain, denies pelvic pain, denies fever/chills, denies nausea/vomiting. States that his brother had hx of prostate CA, but states was exposed to agent orange exposure. He states that his brother's prostate was removed d/t advanced stage (\"level 3\"). Completed CYSTOSCOPY, PROSTATE BIOPSY ULTRASOUND 11-28-22.   Lab Results   Component Value Date    PSA 3.78 08/08/2022    PSA 4.28 (H) 07/21/2022    PSA 2.45 07/14/2021 RECENT IMAGING R/T HPI     Narrative   EXAMINATION:   MULTIPARAMETRIC MRI OF THE PROSTATE WITH AND WITHOUT CONTRAST       9/20/2022:       TECHNIQUE:   Multiparametric imaging with dynamic contrast enhanced imaging and diffusion   weighted imaging was performed. COMPARISON:   No priors       HISTORY:   ORDERING SYSTEM PROVIDED HISTORY: History of elevated PSA   TECHNOLOGIST PROVIDED HISTORY:   STAT Creatinine as needed:->No   Reason for Exam: History of elevated PSA       FINDINGS:   Prostate:  4.0 x 4.5 x 5.1 cm. Estimated volume 48 cc. PERIPHERAL ZONE:       Mostly diffusely T2 hyperintense except toward the base where it is more   hypointense. No abnormal ADC or DWI signal.  Overall score PI-RADS 2. TRANSITION ZONE: Enlarged and heterogeneous with multiple nodules, consistent   with BPH. A 2.7 cm BPH nodule projects into the bladder base. No   significant restricted diffusion or abnormal enhancement. SEMINAL VESICLES:       Somewhat smaller size with some areas showing decreased signal intensity may   reflect chronic inflammation. Neurovascular bundle:  Unremarkable. Lymphadenopathy:  No evidence of lymphadenopathy. Bladder:  Urinary bladder is mostly collapsed. No suspicious lesions. Bowel:  Extensive sigmoid diverticulosis. Peritoneal cavity:   Nonspecific small volume pelvic free fluid. May reflect   occult infectious or inflammatory process. Consider CT abdomen pelvis. Bones/soft tissues:   Normal bone marrow signal intensity. No suspicious or   aggressive osseous lesions. Impression   1. BPH. A 2.7 cm nodule projects into the bladder base. 2. Clinically significant cancer is unlikely to be present in the peripheral   zone. PI-RADS 2 as discussed above. 3. Pelvic free fluid. Nonspecific. Could be related to occult   infectious/inflammatory process or malignancy.   Consider CT abdomen pelvis   with contrast for further evaluation. Narrative   EXAMINATION:   CT OF THE PELVIS WITH CONTRAST 12/8/2022 7:50 am       TECHNIQUE:   CT of the pelvis was performed with the administration of intravenous   contrast. Multiplanar reformatted images are provided for review. Automated   exposure control, iterative reconstruction, and/or weight based adjustment of   the mA/kV was utilized to reduce the radiation dose to as low as reasonably   achievable. COMPARISON:   MR dated 09/20/2022       HISTORY:   ORDERING SYSTEM PROVIDED HISTORY: Prostate cancer [facility])   TECHNOLOGIST PROVIDED HISTORY:       STAT Creatinine as needed:->No   Reason for Exam: new finding of spot on prostate       FINDINGS:   Heterogeneous enlargement of the prostate with dystrophic calcification. There is posterior impression identified the base of the bladder. The   bladder is incompletely distended. No gross mass or lesion. There is no   evidence of pelvic adenopathy. No significant atherosclerotic disease seen   within the pelvic vessels. Only minimal atherosclerotic disease within the   common iliac arteries bilaterally. Pelvic portions of the gastrointestinal tract reveal extensive diverticulosis   of the colon. Minimal wall thickening identified of the sigmoid colon to   suggest chronic inflammation. No significant surrounding stranding. No   abnormal mass or fluid collections seen within the pelvis. Trace fluid   identified within the pelvis. Small bilateral inguinal hernias containing fat only. Bony structures reveal degenerative changes seen within the spine. There is   no aggressive osseous abnormality present. Degenerative changes seen within   the hips. Impression   There is heterogeneous enlargement of the prostate with posterior impression   identified of the base of the bladder. There is incomplete distension of the   bladder with no significant wall thickening.        Trace fluid identified within the pelvis with extensive diverticulosis and   mild wall thickening of the sigmoid colon to suggest chronic inflammation. No significant surrounding stranding to suggest an acute process. Narrative   EXAMINATION:   WHOLE BODY BONE SCAN  12/8/2022       TECHNIQUE:   The patient was injected intravenously with 26.4 mCi of 99 mTc MDP and   scintigraphy of the entire skeleton was performed approximately three hours   later. COMPARISON:   No prior bone scintigraphy for comparison       HISTORY:   ORDERING SYSTEM PROVIDED HISTORY: Prostate cancer [facility])   TECHNOLOGIST PROVIDED HISTORY:   Reason for Exam: Prostate cancer ([facility] [address]   Additional signs and symptoms: prostate biospy last week, arthritis, chronic   lymphocytic leukemia x 2006       FINDINGS:   There is mild diffuse radiotracer activity in the spine which is likely   degenerative. Uptake in bilateral shoulders, sternoclavicular joints,   elbows, wrists, hands, hips, and knees are most likely degenerative related   as well. Asymmetric increased uptake is noted in the right greater trochanter which   could be degenerative or inflammatory. Questionable faint focal uptake is   noted in the posterior left 5th rib. Attention on follow-up is recommended. Otherwise, there is no suspicious focus of abnormal increased radiotracer   uptake in the axial or visualized appendicular skeleton. Physiologic activity is present in the kidneys and urinary bladder. Impression   Asymmetric increased uptake noted in the right greater trochanter could be   degenerative or inflammatory. Clinical correlation is recommended. Otherwise, there is no definite scintigraphic evidence to suggest   osteoblastic metastatic disease.          SURGICAL PATHOLOGY REPORT  Order: [id number]  Status: Final result    Visible to patient: Yes (seen)    Next appt: 02/07/2023 at 10:00 AM in Radiology [facility] ROOM 1)    0 Result Notes  Component 11/28/22 0759    Surgical Pathology Report -- Diagnosis --   A. PROSTATE, LB, NEEDLE CORE BIOPSY:        -  BENIGN PROSTATIC TISSUE. B.  PROSTATE, LLB, NEEDLE CORE BIOPSY:        -  BENIGN PROSTATIC TISSUE. C.  PROSTATE, LM, NEEDLE CORE BIOPSY:        -  BENIGN PROSTATIC TISSUE. D.  PROSTATE, LLM, NEEDLE CORE BIOPSY:        -  BENIGN PROSTATIC TISSUE. E.  PROSTATE, LA, NEEDLE CORE BIOPSY:        -  BENIGN PROSTATIC TISSUE. F.  PROSTATE, LLA, NEEDLE CORE BIOPSY:        -  BENIGN PROSTATIC TISSUE. G.  PROSTATE, RB, NEEDLE CORE BIOPSY:        -  BENIGN PROSTATIC TISSUE. H.  PROSTATE, RLB, NEEDLE CORE BIOPSY:        -  BENIGN PROSTATIC TISSUE. I.  PROSTATE, RM, NEEDLE CORE BIOPSY:             -  ADENOCARCINOMA, QUINN SCORE 4+3 = 7 (WHO/ISUP GRADE   GROUP 3), INVOLVING 1 OF 1 CORE, 0.6 MM CONTIGUOUS LENGTH, 4% OF TOTAL   BIOPSY VOLUME. J.  PROSTATE, RLM, NEEDLE CORE BIOPSY:             -  ADENOCARCINOMA, QUINN SCORE 3+4= 7 (WHO/ISUP GRADE   GROUP 2), INVOLVING 1 OF 1 CORE, 2.4 MM DISCONTIGUOUS LENGTH, 14% OF   TOTAL BIOPSY VOLUME.     K.  PROSTATE, RA, NEEDLE CORE BIOPSY:             -  ADENOCARCINOMA, QUINN SCORE 4+3 = 7 (WHO/ISUP GRADE   GROUP 3), INVOLVING 1 OF 1 CORE, 5.0 MM DISCONTIGUOUS LENGTH, 31% OF   TOTAL BIOPSY VOLUME. L.  PROSTATE, RLA, NEEDLE CORE BIOPSY:             -  ADENOCARCINOMA, QUINN SCORE 4+4 = 8 (WHO/ISUP GRADE   GROUP 4), INVOLVING 1 OF 1 CORE, 2.4 MM CONTIGUOUS LENGTH, 16% OF   TOTAL BIOPSY VOLUME. Nallely Delong M.D.   **Electronically Signed Out**         jet/11/30/2022         Review of additional significant medical hx:  CHRONIC LYMPHOCYTIC LEUKEMIA: Patient states that his numbers are stable, most recently followed with Dr. Kacy Nogueira at [place] [facility] (last OV noted 10-30-20). States that his prior local oncologist passes away unexpectedly, started following w/Dr. Kacy Nogueira after this (OV note placed on surgery chart- see Care Everywhere for further detail).  Was told per specialist that when he has annual physical, requested PCP send him copies of lab work. Denies any prior hx of chemo/radiation.    COVID-19: States x3 days had \"bad flu\"- felt \"normal\" by day 5.     OCULAR MYASTHENIA GRAVIS: States he noted blurry vision with reading 12-15 years ago, he was referred to a neuro ophthalmologist- was started on Mestinon, which cleared s/s within 24 hours- still takes 4 times/day. He will be following w/new specialist at [facility] in the future.     HLD, HTN: States BP is typically stable, takes all medications as rx'd. Denies current/recent chest pain, palpitations, SOB, + dizziness (at times, states r/t low blood sugar- states PCP is aware), denies leg swelling, denies persistent headaches.   Current medications r/t condition: AVAPRO, SIMVASTATIN  BP Readings from Last 3 Encounters:   01/18/23 111/73   01/12/23 132/78   11/28/22 127/78      HYPOGLYCEMIA: States occurs sporadically, has to be careful w/Jew fasting. States he will feel a little lightheaded, eats some carbs to help. He is not diabetic. Denies any syncope hx.    KIDNEY STONES: States s/s improved w/decreased sodium intake- has not had a kidney stone in about a year- has needed surgical intervention.    Activity level: Patient states that he does power walk, depending on the weather. He does take care of his mother, states he is primary caregiver. He does try to walk 3 miles/day, he is also a vegetarian for x2 years.  Functional Capacity per patient:              1. Patient is able to walk 2 city blocks on level ground without SOB.              2. Patient is able to climb 2 flights of stairs without SOB.    Denies hx of MRSA infection.  Denies hx of blood clots.  Denies hx of any personal or family hx of complications w/anesthesia.   PAST MEDICAL HISTORY     Past Medical History:   Diagnosis Date    Arthritis     right shoulder, knee    BPH (benign prostatic hyperplasia)     Caffeine use     2 cups  coffee/day    Chronic back pain     Chronic lymphocytic leukemia ([address]     numbers are stable. [facility] Dr. Bobbi Payne. last appt 2020    COVID-19 10/22/2022    body aches, sinus congestion x 3 days, felt \"normal\" by day 5    Elevated PSA     History of myasthenia gravis     occular    Hyperlipidemia     Hypertension     Hypoglycemia     Kidney stones     Lightheadedness     Patient states dizziness/lightheadedness w/hypoglycemia    Ocular myasthenia gravis ([address]     Prostate cancer ([address] 12/2022    PVC's (premature ventricular contractions)     Sciatica     lower back    Vitamin D deficiency     Wears glasses        SURGICAL HISTORY       Past Surgical History:   Procedure Laterality Date    COLONOSCOPY      2 times    CYSTOSCOPY  11/28/2022    EXTRACORPOREAL SHOCK WAVE LITHOTRIPSY  06/20/2016    Procedure: EXTRACORPOREAL SHOCK WAVE LITHOTRIPSY, right needs KUB; Surgeon: Rain Scherer MD; Location: [facility] OR; Service: Urology Surgery; Laterality: Right; MR    EYE SURGERY      30 years ago laser to close holes in retina    KNEE ARTHROSCOPY Bilateral     meniscus    LITHOTRIPSY      3 times    PROSTATE BIOPSY  11/28/2022    PROSTATE BIOPSY N/A 11/28/2022    CYSTOSCOPY,  PROSTATE BIOPSY ULTRASOUND performed by Deana Peña MD at [facility]       Social History     Socioeconomic History    Marital status: Single     Spouse name: None    Number of children: None    Years of education: None    Highest education level: None   Tobacco Use    Smoking status: Former     Types: Pipe, Cigarettes, Cigars    Smokeless tobacco: Never    Tobacco comments:     Smoked a pipe about 1 year in his 19's, also smoked cigarettes in college, smoked x1 cigar in 1992 (quit smoking in 1992).    Vaping Use    Vaping Use: Never used   Substance and Sexual Activity    Alcohol use: Not Currently     Alcohol/week: 3.0 standard drinks     Types: 3 Standard drinks or equivalent per week     Comment: 2-3 times a week glass of wine, once in a while a mixed drink    Drug use: Never     Social Determinants of Health     Physical Activity: Sufficiently Active    Days of Exercise per Week: 3 days    Minutes of Exercise per Session: 60 min       REVIEW OF SYSTEMS      Allergies   Allergen Reactions    Ciprofloxacin Other (See Comments)     Chronic lymph leukemia, , occular miastenia gravis and cipro make it worse         Current Outpatient Medications on File Prior to Encounter   Medication Sig Dispense Refill    acetaminophen (TYLENOL) 500 MG tablet Take 1,000 mg by mouth every 6 hours as needed for Pain      tamsulosin (FLOMAX) 0.4 MG capsule Take 1 capsule by mouth in the morning and at bedtime 180 capsule 5    irbesartan (AVAPRO) 150 MG tablet Take 1 tablet by mouth daily 30 tablet 11    dutasteride (AVODART) 0.5 MG capsule take 1 capsule by mouth once daily 90 capsule 3    simvastatin (ZOCOR) 20 MG tablet Take 1 tablet by mouth nightly 90 tablet 3    CLARITIN-D 24 HOUR  MG per extended release tablet Take 1 tablet by mouth daily 301 tablet 11    Cholecalciferol 2000 units TABS Take 2,000 Units by mouth daily D3      Glucosamine HCl--250 MG TABS Take 2 tablets by mouth daily Move Free      pyridostigmine (MESTINON) 60 MG tablet Take 60 mg by mouth 4 times daily        No current facility-administered medications on file prior to encounter. Review of Systems   Constitutional:  Negative for chills and fever. HENT:  Negative for congestion (Takes Claritin-D- hx of congestion), ear pain, rhinorrhea, sore throat and trouble swallowing. Respiratory:  Negative for apnea, cough, shortness of breath and wheezing. Cardiovascular:  Negative for chest pain, palpitations and leg swelling. Gastrointestinal:  Negative for abdominal pain, anal bleeding, blood in stool, constipation, diarrhea, nausea and vomiting. Genitourinary:         See HPI.    Musculoskeletal:         States \"bone on bone\" to right hip area, effects pain radiating down RLE. Skin:  Positive for wound (Cut under right thumb, no bleeding). Negative for rash. Allergic/Immunologic: Positive for environmental allergies. Neurological:  Positive for dizziness (Hx of with hypoglycemia, denies currently) and light-headedness (Hx of with hypoglycemia, no complaints currently). Negative for syncope and headaches. Hematological:  Does not bruise/bleed easily. GENERAL PHYSICAL EXAM     Vitals: /73   Pulse 60 Comment: Per auscultation  Temp 97.5 °F (36.4 °C) (Infrared)   Resp 16   Ht 5' 10.5\" (1.791 m)   Wt 170 lb (77.1 kg)   SpO2 100%   BMI 24.05 kg/m²               Physical Exam  Vitals reviewed. Constitutional:       General: He is not in acute distress. Appearance: He is well-developed. He is not ill-appearing, toxic-appearing or diaphoretic. HENT:      Head: Normocephalic. Right Ear: External ear normal.      Left Ear: External ear normal.      Nose: Nose normal.      Mouth/Throat:      Pharynx: No oropharyngeal exudate or posterior oropharyngeal erythema. Tonsils: No tonsillar abscesses. Eyes:      General:         Right eye: No discharge. Left eye: No discharge. Conjunctiva/sclera: Conjunctivae normal.      Pupils: Pupils are equal, round, and reactive to light. Comments: + glasses. Cardiovascular:      Rate and Rhythm: Normal rate and regular rhythm. Pulses: Intact distal pulses. Heart sounds: Normal heart sounds. Comments: Overall rhythm is regular w/isolated, occasional irregular beats- consistent w/PVC's noted on EKG 11-15-22. Pulmonary:      Effort: Pulmonary effort is normal. No accessory muscle usage or respiratory distress. Breath sounds: Normal breath sounds. No decreased breath sounds, wheezing, rhonchi or rales. Abdominal:      General: Bowel sounds are normal. There is no distension. Palpations: Abdomen is soft.  There is no mass.      Tenderness: There is no abdominal tenderness. There is no right CVA tenderness, left CVA tenderness, guarding or rebound. Musculoskeletal:      Right lower leg: No swelling or tenderness. No edema. Left lower leg: No swelling or tenderness. No edema. Comments: Negative Shelia's sign b/l (performed in sitting position). Lymphadenopathy:      Cervical: No cervical adenopathy. Skin:     General: Skin is warm and dry. Findings: Erythema (Mild erythema inbetween eye brows, forehead region- patient states dry skin, uses medicated lotion) present. Comments: Superficial cut to right thumb nail bed area, scabbing noted, no active bleeding, no surrounding erythema. Neurological:      Mental Status: He is alert and oriented to person, place, and time.    Psychiatric:         Behavior: Behavior normal.       LAB REVIEW     Lab Results   Component Value Date     01/18/2023    K 5.0 01/18/2023     01/18/2023    CO2 28 01/18/2023    BUN 11 01/18/2023    CREATININE 0.64 (L) 01/18/2023    GLUCOSE 89 01/18/2023    CALCIUM 9.1 01/18/2023    LABGLOM >60 01/18/2023    GFRAA >60 10/25/2019     Lab Results   Component Value Date    WBC 25.2 (H) 01/18/2023    HGB 13.8 01/18/2023    HCT 42.4 01/18/2023    MCV 97.0 01/18/2023     01/18/2023     Contains abnormal data CBC with Auto Differential  Order: [id number]  Status: Final result    Visible to patient: Yes (not seen)    Next appt: 02/07/2023 at 10:00 AM in Radiology [facility] [place] ROOM 1)    0 Result Notes  Component Ref Range & Units 1/18/23 1300 11/15/22 1446 8/8/22 10/25/19 1535   WBC 3.5 - 11.0 k/uL 25.2 High     R  26.1 High     RBC 4.5 - 5.9 m/uL 4.37 Low     R  4.49 Low     Hemoglobin 13.5 - 17.5 g/dL 13.8  12.8 Low  R   R  13.8    Hematocrit 41 - 53 % 42.4  39.9 Low  R   R  41.9    MCV 80 - 100 fL 97.0    R  93.4    MCH 26 - 34 pg 31.5    R  30.8    MCHC 31 - 37 g/dL 32.5    R  33.0    RDW 11.5 - 14.9 % 13.9    R  14.0    Platelets 150 - 450 k/uL 162    R  170    MPV 6.0 - 12.0 fL 8.7    R  9.2    Seg Neutrophils 36 - 66 % 16 Low     13 Low     Lymphocytes 24 - 44 % 81 High     82 High     Monocytes 1 - 7 % 3    3    Eosinophils % 0 - 4 % 0    0    Basophils 0 - 2 % 0    0    Segs Absolute 1.3 - 9.1 k/uL 4.03    3.39    Absolute Lymph # 1.0 - 4.8 k/uL 20.41 High     21.41 High     Absolute Mono # 0.1 - 1.3 k/uL 0.76    0.78    Absolute Eos # 0.0 - 0.4 k/uL 0.00    0.00    Basophils Absolute 0.0 - 0.2 k/uL 0.00    R  0.00    Morphology  SMUDGE CELLS PRESENT    Normal    Neutrophils %         NRBC Automated     NOT REPORTED R    Differential Type     NOT REPORTED    Immature Granulocytes     NOT REPORTED R    Absolute Immature Granulocyte     NOT REPORTED R    WBC Morphology     NOT REPORTED    RBC Morphology     NOT REPORTED    Platelet Estimate     NOT REPORTED    Atypical Lymphocytes     2 R    Atypical Lymphocytes Absolute     0.52 R    Lymphocytes %         Monocytes %         Eosinophils %         Basophils %         Neutrophils Absolute         Lymphocytes Absolute         Monocytes Absolute         Eosinophils Absolute         Resulting [place] [id number]  [address] [facility] Lab              Specimen Collected: 01/18/23 13:00 EST Last Resulted: 01/18/23 14:35 EST           EKG REVIEW, DATE: 11-15-22     Narrative & Impression    Sinus rhythm with occasional Premature ventricular complexes  Otherwise normal ECG  No previous ECGs available      Specimen Collected: 11/15/22 15:22 EST Last Resulted: 11/16/22 08:11 EST        SURGERY / PROVISIONAL DIAGNOSES:      PROSTATECTOMY LAPAROSCOPIC ROBOTIC XI WITH PELVIC LYMPH NODE DISSECTION    PROSTATE CANCER    Patient Active Problem List    Diagnosis Date Noted    Preop examination 01/17/2023    Spondylosis without myelopathy or radiculopathy, sacral and sacrococcygeal region 11/08/2022    History of myasthenia gravis 10/04/2022    Acquired spondylolisthesis 08/25/2022    Degeneration of lumbar intervertebral disc 08/25/2022    Dermatochalasis of left upper eyelid 08/25/2022    Dermatochalasis of right upper eyelid 08/25/2022    Essential hypertension 08/25/2022    Hyperglycemia 08/25/2022    Hyperlipidemia 08/25/2022    Lesion of ulnar nerve 08/25/2022    Localized, primary osteoarthritis of shoulder region 08/25/2022    Lower urinary tract symptoms due to benign prostatic hyperplasia 08/25/2022    Lumbosacral spondylosis without myelopathy 08/25/2022    Nuclear sclerosis of both eyes 08/25/2022    Nuclear senile cataract 08/25/2022    Osteoarthritis of knee 08/25/2022    Preglaucoma, unspecified, bilateral 08/25/2022    Rupture of right rotator cuff 08/25/2022    Tear of lateral meniscus of right knee, current 08/25/2022    Other specified enthesopathies of right lower limb, excluding foot 07/12/2022    Pes anserinus tendinitis 07/11/2022    Pain in right leg 05/11/2022    Unilateral primary osteoarthritis, right hip 04/07/2022    Unsp athscl native arteries of extremities, right leg ([facility] [address] 04/07/2022    Primary osteoarthritis, left hand 03/03/2022    Other specified arthritis, unspecified hand 03/03/2022    Pain in right knee 11/22/2021    Unilateral primary osteoarthritis, right knee 11/22/2021    Herpes zoster 03/09/2020    Chronic low back pain 02/14/2020    Chronic lymphocytic leukemia ([facility] [address] 02/22/2016    Calculus of kidney 01/14/2015    Hypertrophy of prostate with urinary obstruction and other lower urinary tract symptoms (LUTS) 01/14/2015    Microscopic hematuria 01/12/2015     Multiple problems were reconciled today from outside sources (see updated \"Patient Active Problem List\" above). See Care Everywhere for additional detail. Note: Patient questioning when he should stop Avodart, Flomax prior to surgery- advised to call Dr. Dorethia Cabot office for clarification.     EKG noted above, CBC, BMP completed today has been previewed per myself. CLEARANCE: Medical clearance requested per surgeon (patient stated). Based on my personal evaluation of patient including review of patient's chart, medical clearance will be required for scheduled surgery d/t the following issues which are discussed in H&P above (also discussed w/anesthesia, Dr. Yoan Conway- per Dr. Yoan Conway, up to surgeon if they would like HemOc clearance, order given to re-check potassium in pre-op (K+ today is 5.0, specimen slightly hemolyzed), day of surgery):  - CHRONIC LYMPHOCYTIC LEUKEMIA (WBC- 25.2, 1-18-23)  - HX OF OCULAR MYASTHENIA GRAVIS  - HX OF HTN, HLD  - HX OF HYPOGLYCEMIA, DIZZINESS/LIGHTHEADEDNESS ASSOCIATED W/HYPOGLYCEMIA  - EKG RESULT, 11-15-22: \"SINUS RHYTHM WITH OCCASIONAL [place] Newton Orellana NORMAL ECG / Roberto Pew"  Dr. Blackwood Pals office who will be responsible for making sure the clearance is obtained and is in the chart for surgery.     Total time spent on encounter- PAT provider minutes: 31-40 minutes     ALONDRA Perkins CNP on 1/18/2023 at 3:38 PM

## 2023-01-18 ENCOUNTER — HOSPITAL ENCOUNTER (OUTPATIENT)
Dept: PREADMISSION TESTING | Age: 73
Discharge: HOME | End: 2023-01-18
Payer: MEDICARE

## 2023-01-18 VITALS
DIASTOLIC BLOOD PRESSURE: 73 MMHG | BODY MASS INDEX: 23.8 KG/M2 | SYSTOLIC BLOOD PRESSURE: 111 MMHG | RESPIRATION RATE: 16 BRPM | TEMPERATURE: 97.5 F | OXYGEN SATURATION: 100 % | HEART RATE: 60 BPM | HEIGHT: 71 IN | WEIGHT: 170 LBS

## 2023-01-18 DIAGNOSIS — Z01.818 PREOP EXAMINATION: ICD-10-CM

## 2023-01-18 PROBLEM — M19.042 PRIMARY OSTEOARTHRITIS, LEFT HAND: Status: ACTIVE | Noted: 2022-03-03

## 2023-01-18 PROBLEM — M16.11 UNILATERAL PRIMARY OSTEOARTHRITIS, RIGHT HIP: Status: ACTIVE | Noted: 2022-04-07

## 2023-01-18 PROBLEM — M47.818 SPONDYLOSIS WITHOUT MYELOPATHY OR RADICULOPATHY, SACRAL AND SACROCOCCYGEAL REGION: Status: ACTIVE | Noted: 2022-11-08

## 2023-01-18 PROBLEM — I70.201: Status: ACTIVE | Noted: 2022-04-07

## 2023-01-18 PROBLEM — M79.604 PAIN IN RIGHT LEG: Status: ACTIVE | Noted: 2022-05-11

## 2023-01-18 PROBLEM — M17.11 UNILATERAL PRIMARY OSTEOARTHRITIS, RIGHT KNEE: Status: ACTIVE | Noted: 2021-11-22

## 2023-01-18 PROBLEM — M25.561 PAIN IN RIGHT KNEE: Status: ACTIVE | Noted: 2021-11-22

## 2023-01-18 PROBLEM — M76.891 OTHER SPECIFIED ENTHESOPATHIES OF RIGHT LOWER LIMB, EXCLUDING FOOT: Status: ACTIVE | Noted: 2022-07-12

## 2023-01-18 PROBLEM — M13.849: Status: ACTIVE | Noted: 2022-03-03

## 2023-01-18 LAB
ABO/RH: NORMAL
ABSOLUTE EOS #: 0 K/UL (ref 0–0.4)
ABSOLUTE LYMPH #: 20.41 K/UL (ref 1–4.8)
ABSOLUTE MONO #: 0.76 K/UL (ref 0.1–1.3)
ANION GAP SERPL CALCULATED.3IONS-SCNC: 7 MMOL/L (ref 9–17)
ANTIBODY SCREEN: NEGATIVE
ARM BAND NUMBER: NORMAL
BASOPHILS # BLD: 0 % (ref 0–2)
BASOPHILS ABSOLUTE: 0 K/UL (ref 0–0.2)
BLOOD BANK COMMENT: NORMAL
BUN BLDV-MCNC: 11 MG/DL (ref 8–23)
CALCIUM SERPL-MCNC: 9.1 MG/DL (ref 8.6–10.4)
CHLORIDE BLD-SCNC: 104 MMOL/L (ref 98–107)
CO2: 28 MMOL/L (ref 20–31)
CREAT SERPL-MCNC: 0.64 MG/DL (ref 0.7–1.2)
EOSINOPHILS RELATIVE PERCENT: 0 % (ref 0–4)
EXPIRATION DATE: NORMAL
GFR SERPL CREATININE-BSD FRML MDRD: >60 ML/MIN/1.73M2
GLUCOSE BLD-MCNC: 89 MG/DL (ref 70–99)
HCT VFR BLD CALC: 42.4 % (ref 41–53)
HEMOGLOBIN: 13.8 G/DL (ref 13.5–17.5)
LYMPHOCYTES # BLD: 81 % (ref 24–44)
MCH RBC QN AUTO: 31.5 PG (ref 26–34)
MCHC RBC AUTO-ENTMCNC: 32.5 G/DL (ref 31–37)
MCV RBC AUTO: 97 FL (ref 80–100)
MONOCYTES # BLD: 3 % (ref 1–7)
MORPHOLOGY: ABNORMAL
PDW BLD-RTO: 13.9 % (ref 11.5–14.9)
PLATELET # BLD: 162 K/UL (ref 150–450)
PMV BLD AUTO: 8.7 FL (ref 6–12)
POTASSIUM SERPL-SCNC: 5 MMOL/L (ref 3.7–5.3)
RBC # BLD: 4.37 M/UL (ref 4.5–5.9)
SEG NEUTROPHILS: 16 % (ref 36–66)
SEGMENTED NEUTROPHILS ABSOLUTE COUNT: 4.03 K/UL (ref 1.3–9.1)
SODIUM BLD-SCNC: 139 MMOL/L (ref 135–144)
WBC # BLD: 25.2 K/UL (ref 3.5–11)

## 2023-01-18 PROCEDURE — APPSS45 APP SPLIT SHARED TIME 31-45 MINUTES: Performed by: NURSE PRACTITIONER

## 2023-01-18 PROCEDURE — 87086 URINE CULTURE/COLONY COUNT: CPT

## 2023-01-18 PROCEDURE — 85025 COMPLETE CBC W/AUTO DIFF WBC: CPT

## 2023-01-18 PROCEDURE — 36415 COLL VENOUS BLD VENIPUNCTURE: CPT

## 2023-01-18 PROCEDURE — 86850 RBC ANTIBODY SCREEN: CPT

## 2023-01-18 PROCEDURE — 86900 BLOOD TYPING SEROLOGIC ABO: CPT

## 2023-01-18 PROCEDURE — 80048 BASIC METABOLIC PNL TOTAL CA: CPT

## 2023-01-18 PROCEDURE — 86901 BLOOD TYPING SEROLOGIC RH(D): CPT

## 2023-01-18 RX ORDER — ACETAMINOPHEN 500 MG
1000 TABLET ORAL EVERY 6 HOURS PRN
COMMUNITY

## 2023-01-18 ASSESSMENT — ENCOUNTER SYMPTOMS
BLOOD IN STOOL: 0
DIARRHEA: 0
NAUSEA: 0
ABDOMINAL PAIN: 0
APNEA: 0
SHORTNESS OF BREATH: 0
ANAL BLEEDING: 0
CONSTIPATION: 0
COUGH: 0
RHINORRHEA: 0
WHEEZING: 0
SORE THROAT: 0
VOMITING: 0
TROUBLE SWALLOWING: 0

## 2023-01-18 NOTE — DISCHARGE INSTRUCTIONS
Pre-op Instructions For Out-Patient Surgery    Medication Instructions:  Please stop herbs and any supplements now (includes vitamins and minerals). Please contact your surgeon and prescribing physician for pre-op instructions for any blood thinners. Ibuprofen as directed. If you have inhalers/aerosol treatments at home, please use them the morning of your surgery and bring the inhalers with you to the hospital.    Please take the following medications the morning of your surgery with a sip of water:    irbesartan, mestinon (pyridostigmine)     Surgery Instructions:  After midnight before surgery:  Do not eat or drink anything, including water, mints, gum, and hard candy. You may brush your teeth without swallowing. No smoking, chewing tobacco, or street drugs. Please shower or bathe before surgery. If you were given Surgical Scrub Chlorhexidine Gluconate Liquid (CHG), please shower the night before and the morning of your surgery following the detailed instructions you received during your pre-admission visit. Please do not wear any cologne, lotion, powder, deodorant, jewelry, piercings, perfume, makeup, nail polish, hair accessories, or hair spray on the day of surgery. Wear loose comfortable clothing. Leave your valuables at home. Bring a storage case for any glasses/contacts. An adult who is responsible for you MUST drive you home and should be with you for the first 24 hours after surgery. If having out-patient knee and foot surgeries, please arrange for planned crutches, walker, or wheelchair before arriving to the hospital.    The Day of Surgery:  Arrive at [facility] AT [facility]tr[facility] at the time directed by your surgeon and check in at the desk. If you have a living will or healthcare power of , please bring a copy. You will be taken to the pre-op holding area where you will be prepared for surgery.   A physical assessment will be performed by a nurse practitioner or house officer. Your IV will be started and you will meet your anesthesiologist.    When you go to surgery, your family will be directed to the surgical waiting room, where the doctor should speak with them after your surgery. After surgery, you will be taken to the recovery room then when you are awake and stable you will go to the short stay unit for preparation to be discharged. If you use a Bi-PAP or C-PAP machine, please bring it with you and leave it in the car in case it is needed in recovery room.

## 2023-01-19 ENCOUNTER — TELEPHONE (OUTPATIENT)
Dept: PREADMISSION TESTING | Age: 73
End: 2023-01-19

## 2023-01-19 LAB
CULTURE: NO GROWTH
SPECIMEN DESCRIPTION: NORMAL

## 2023-01-30 ENCOUNTER — ANESTHESIA EVENT (OUTPATIENT)
Dept: OPERATING ROOM | Age: 73
End: 2023-01-30
Payer: MEDICARE

## 2023-01-30 NOTE — PRE-PROCEDURE INSTRUCTIONS
Nothing to eat after midnight. Are you taking any blood thinners? When was the last day? Make sure to use Hibiclens prior to surgery. Remove any jewelry and body piercings. Do you wear glasses? If so, please bring a case to store them in. Are you having any Covid symptoms? Do you have any new rashes, infections, etc. that we should be aware of? Do you have a ride home the day of surgery? It cannot be a cab or medical transportation.   Verify surgery time and what time to arrive at hospital.  NO ANSWER, VM LEFT Refill request from pharmacy for the medication listed below.  Patient was last seen 6-18-21.  Next appt 7-30-21.  Please advise regarding refill.    Last written as   5-20-20  # 1 with 3 refills          Pharmacy:  [facility]        Drug Prescription Monitoring    Associated Diagnosis for Medication:  [place] PDMP Reviewed:  Date of Last Urine Drug Screen:  (If + for illegal substances do not fill; if negative for expected opiate do not fill)  Last Dispensed from Pharmacy:  New Refill Start Date Expected:  Next Refill After This Not Until:

## 2023-01-31 ENCOUNTER — HOSPITAL ENCOUNTER (OUTPATIENT)
Age: 73
Setting detail: OUTPATIENT SURGERY
Discharge: HOME | End: 2023-01-31
Attending: UROLOGY | Admitting: UROLOGY
Payer: MEDICARE

## 2023-01-31 ENCOUNTER — TELEPHONE (OUTPATIENT)
Dept: UROLOGY | Age: 73
End: 2023-01-31

## 2023-01-31 ENCOUNTER — ANESTHESIA (OUTPATIENT)
Dept: OPERATING ROOM | Age: 73
End: 2023-01-31
Payer: MEDICARE

## 2023-01-31 VITALS
HEART RATE: 65 BPM | BODY MASS INDEX: 23.8 KG/M2 | DIASTOLIC BLOOD PRESSURE: 69 MMHG | TEMPERATURE: 97.3 F | WEIGHT: 170 LBS | OXYGEN SATURATION: 95 % | SYSTOLIC BLOOD PRESSURE: 125 MMHG | HEIGHT: 71 IN | RESPIRATION RATE: 14 BRPM

## 2023-01-31 DIAGNOSIS — C61 PROSTATE CANCER (HCC): Primary | ICD-10-CM

## 2023-01-31 LAB — POTASSIUM SERPL-SCNC: 4.3 MMOL/L (ref 3.7–5.3)

## 2023-01-31 PROCEDURE — 2580000003 HC RX 258: Performed by: NURSE ANESTHETIST, CERTIFIED REGISTERED

## 2023-01-31 PROCEDURE — 7100000000 HC PACU RECOVERY - FIRST 15 MIN: Performed by: UROLOGY

## 2023-01-31 PROCEDURE — 6360000002 HC RX W HCPCS: Performed by: UROLOGY

## 2023-01-31 PROCEDURE — 7100000001 HC PACU RECOVERY - ADDTL 15 MIN: Performed by: UROLOGY

## 2023-01-31 PROCEDURE — 2500000003 HC RX 250 WO HCPCS: Performed by: ANESTHESIOLOGY

## 2023-01-31 PROCEDURE — 6370000000 HC RX 637 (ALT 250 FOR IP): Performed by: ANESTHESIOLOGY

## 2023-01-31 PROCEDURE — 87086 URINE CULTURE/COLONY COUNT: CPT

## 2023-01-31 PROCEDURE — 7100000011 HC PHASE II RECOVERY - ADDTL 15 MIN: Performed by: UROLOGY

## 2023-01-31 PROCEDURE — 3700000000 HC ANESTHESIA ATTENDED CARE: Performed by: UROLOGY

## 2023-01-31 PROCEDURE — 88309 TISSUE EXAM BY PATHOLOGIST: CPT

## 2023-01-31 PROCEDURE — 84132 ASSAY OF SERUM POTASSIUM: CPT

## 2023-01-31 PROCEDURE — 3600000019 HC SURGERY ROBOT ADDTL 15MIN: Performed by: UROLOGY

## 2023-01-31 PROCEDURE — 6360000002 HC RX W HCPCS: Performed by: ANESTHESIOLOGY

## 2023-01-31 PROCEDURE — 2709999900 HC NON-CHARGEABLE SUPPLY: Performed by: UROLOGY

## 2023-01-31 PROCEDURE — 7100000031 HC ASPR PHASE II RECOVERY - ADDTL 15 MIN: Performed by: UROLOGY

## 2023-01-31 PROCEDURE — 7100000010 HC PHASE II RECOVERY - FIRST 15 MIN: Performed by: UROLOGY

## 2023-01-31 PROCEDURE — 2580000003 HC RX 258: Performed by: ANESTHESIOLOGY

## 2023-01-31 PROCEDURE — 6360000002 HC RX W HCPCS: Performed by: NURSE ANESTHETIST, CERTIFIED REGISTERED

## 2023-01-31 PROCEDURE — 2720000010 HC SURG SUPPLY STERILE: Performed by: UROLOGY

## 2023-01-31 PROCEDURE — 2500000003 HC RX 250 WO HCPCS: Performed by: UROLOGY

## 2023-01-31 PROCEDURE — S2900 ROBOTIC SURGICAL SYSTEM: HCPCS | Performed by: UROLOGY

## 2023-01-31 PROCEDURE — 2500000003 HC RX 250 WO HCPCS: Performed by: NURSE ANESTHETIST, CERTIFIED REGISTERED

## 2023-01-31 PROCEDURE — 36415 COLL VENOUS BLD VENIPUNCTURE: CPT

## 2023-01-31 PROCEDURE — 88305 TISSUE EXAM BY PATHOLOGIST: CPT

## 2023-01-31 PROCEDURE — 88307 TISSUE EXAM BY PATHOLOGIST: CPT

## 2023-01-31 PROCEDURE — 7100000030 HC ASPR PHASE II RECOVERY - FIRST 15 MIN: Performed by: UROLOGY

## 2023-01-31 PROCEDURE — 3700000001 HC ADD 15 MINUTES (ANESTHESIA): Performed by: UROLOGY

## 2023-01-31 PROCEDURE — 3600000009 HC SURGERY ROBOT BASE: Performed by: UROLOGY

## 2023-01-31 DEVICE — CLIP INT XL YEL POLYMER HEM-O-LOK WECK: Type: IMPLANTABLE DEVICE | Site: ABDOMEN | Status: FUNCTIONAL

## 2023-01-31 RX ORDER — EPHEDRINE SULFATE 50 MG/ML
INJECTION, SOLUTION INTRAVENOUS PRN
Status: DISCONTINUED | OUTPATIENT
Start: 2023-01-31 | End: 2023-01-31 | Stop reason: SDUPTHER

## 2023-01-31 RX ORDER — HYDROCODONE BITARTRATE AND ACETAMINOPHEN 5; 325 MG/1; MG/1
1 TABLET ORAL EVERY 6 HOURS PRN
Qty: 28 TABLET | Refills: 0 | Status: SHIPPED | OUTPATIENT
Start: 2023-01-31 | End: 2023-02-07

## 2023-01-31 RX ORDER — SODIUM CHLORIDE 9 MG/ML
INJECTION, SOLUTION INTRAVENOUS PRN
Status: DISCONTINUED | OUTPATIENT
Start: 2023-01-31 | End: 2023-01-31 | Stop reason: HOSPADM

## 2023-01-31 RX ORDER — DIPHENHYDRAMINE HYDROCHLORIDE 50 MG/ML
12.5 INJECTION INTRAMUSCULAR; INTRAVENOUS
Status: DISCONTINUED | OUTPATIENT
Start: 2023-01-31 | End: 2023-01-31 | Stop reason: HOSPADM

## 2023-01-31 RX ORDER — SODIUM CHLORIDE 0.9 % (FLUSH) 0.9 %
5-40 SYRINGE (ML) INJECTION PRN
Status: DISCONTINUED | OUTPATIENT
Start: 2023-01-31 | End: 2023-01-31 | Stop reason: HOSPADM

## 2023-01-31 RX ORDER — SODIUM CHLORIDE, SODIUM LACTATE, POTASSIUM CHLORIDE, CALCIUM CHLORIDE 600; 310; 30; 20 MG/100ML; MG/100ML; MG/100ML; MG/100ML
INJECTION, SOLUTION INTRAVENOUS CONTINUOUS
Status: DISCONTINUED | OUTPATIENT
Start: 2023-01-31 | End: 2023-01-31 | Stop reason: HOSPADM

## 2023-01-31 RX ORDER — LIDOCAINE HYDROCHLORIDE 10 MG/ML
1 INJECTION, SOLUTION EPIDURAL; INFILTRATION; INTRACAUDAL; PERINEURAL
Status: COMPLETED | OUTPATIENT
Start: 2023-01-31 | End: 2023-01-31

## 2023-01-31 RX ORDER — ONDANSETRON 2 MG/ML
INJECTION INTRAMUSCULAR; INTRAVENOUS PRN
Status: DISCONTINUED | OUTPATIENT
Start: 2023-01-31 | End: 2023-01-31 | Stop reason: SDUPTHER

## 2023-01-31 RX ORDER — CEPHALEXIN 500 MG/1
500 CAPSULE ORAL 3 TIMES DAILY
Qty: 9 CAPSULE | Refills: 0 | Status: SHIPPED | OUTPATIENT
Start: 2023-01-31

## 2023-01-31 RX ORDER — HEPARIN SODIUM 5000 [USP'U]/ML
5000 INJECTION, SOLUTION INTRAVENOUS; SUBCUTANEOUS ONCE
Status: COMPLETED | OUTPATIENT
Start: 2023-01-31 | End: 2023-01-31

## 2023-01-31 RX ORDER — MIDAZOLAM HYDROCHLORIDE 1 MG/ML
INJECTION INTRAMUSCULAR; INTRAVENOUS PRN
Status: DISCONTINUED | OUTPATIENT
Start: 2023-01-31 | End: 2023-01-31 | Stop reason: SDUPTHER

## 2023-01-31 RX ORDER — FENTANYL CITRATE 50 UG/ML
INJECTION, SOLUTION INTRAMUSCULAR; INTRAVENOUS PRN
Status: DISCONTINUED | OUTPATIENT
Start: 2023-01-31 | End: 2023-01-31 | Stop reason: SDUPTHER

## 2023-01-31 RX ORDER — ONDANSETRON 2 MG/ML
4 INJECTION INTRAMUSCULAR; INTRAVENOUS
Status: DISCONTINUED | OUTPATIENT
Start: 2023-01-31 | End: 2023-01-31 | Stop reason: HOSPADM

## 2023-01-31 RX ORDER — SODIUM CHLORIDE, SODIUM LACTATE, POTASSIUM CHLORIDE, CALCIUM CHLORIDE 600; 310; 30; 20 MG/100ML; MG/100ML; MG/100ML; MG/100ML
INJECTION, SOLUTION INTRAVENOUS CONTINUOUS PRN
Status: DISCONTINUED | OUTPATIENT
Start: 2023-01-31 | End: 2023-01-31 | Stop reason: SDUPTHER

## 2023-01-31 RX ORDER — HYDROCODONE BITARTRATE AND ACETAMINOPHEN 5; 325 MG/1; MG/1
1 TABLET ORAL ONCE
Status: COMPLETED | OUTPATIENT
Start: 2023-01-31 | End: 2023-01-31

## 2023-01-31 RX ORDER — KETOROLAC TROMETHAMINE 30 MG/ML
INJECTION, SOLUTION INTRAMUSCULAR; INTRAVENOUS PRN
Status: DISCONTINUED | OUTPATIENT
Start: 2023-01-31 | End: 2023-01-31 | Stop reason: SDUPTHER

## 2023-01-31 RX ORDER — LIDOCAINE HYDROCHLORIDE 20 MG/ML
INJECTION, SOLUTION EPIDURAL; INFILTRATION; INTRACAUDAL; PERINEURAL PRN
Status: DISCONTINUED | OUTPATIENT
Start: 2023-01-31 | End: 2023-01-31 | Stop reason: SDUPTHER

## 2023-01-31 RX ORDER — LIDOCAINE HYDROCHLORIDE 10 MG/ML
1 INJECTION, SOLUTION EPIDURAL; INFILTRATION; INTRACAUDAL; PERINEURAL ONCE
Status: COMPLETED | OUTPATIENT
Start: 2023-01-31 | End: 2023-01-31

## 2023-01-31 RX ORDER — SODIUM CHLORIDE 0.9 % (FLUSH) 0.9 %
5-40 SYRINGE (ML) INJECTION EVERY 12 HOURS SCHEDULED
Status: DISCONTINUED | OUTPATIENT
Start: 2023-01-31 | End: 2023-01-31 | Stop reason: HOSPADM

## 2023-01-31 RX ORDER — DOCUSATE SODIUM 100 MG/1
100 CAPSULE, LIQUID FILLED ORAL 2 TIMES DAILY
Qty: 60 CAPSULE | Refills: 0 | Status: SHIPPED | OUTPATIENT
Start: 2023-01-31 | End: 2023-03-02

## 2023-01-31 RX ORDER — PROPOFOL 10 MG/ML
INJECTION, EMULSION INTRAVENOUS PRN
Status: DISCONTINUED | OUTPATIENT
Start: 2023-01-31 | End: 2023-01-31 | Stop reason: SDUPTHER

## 2023-01-31 RX ORDER — GABAPENTIN 100 MG/1
100 CAPSULE ORAL ONCE
Status: COMPLETED | OUTPATIENT
Start: 2023-01-31 | End: 2023-01-31

## 2023-01-31 RX ORDER — BUPIVACAINE HYDROCHLORIDE AND EPINEPHRINE 2.5; 5 MG/ML; UG/ML
INJECTION, SOLUTION EPIDURAL; INFILTRATION; INTRACAUDAL; PERINEURAL PRN
Status: DISCONTINUED | OUTPATIENT
Start: 2023-01-31 | End: 2023-01-31 | Stop reason: ALTCHOICE

## 2023-01-31 RX ORDER — ACETAMINOPHEN 500 MG
1000 TABLET ORAL ONCE
Status: COMPLETED | OUTPATIENT
Start: 2023-01-31 | End: 2023-01-31

## 2023-01-31 RX ORDER — ROCURONIUM BROMIDE 10 MG/ML
INJECTION, SOLUTION INTRAVENOUS PRN
Status: DISCONTINUED | OUTPATIENT
Start: 2023-01-31 | End: 2023-01-31 | Stop reason: SDUPTHER

## 2023-01-31 RX ADMIN — SODIUM CHLORIDE, POTASSIUM CHLORIDE, SODIUM LACTATE AND CALCIUM CHLORIDE: 600; 310; 30; 20 INJECTION, SOLUTION INTRAVENOUS at 08:38

## 2023-01-31 RX ADMIN — HYDROCODONE BITARTRATE AND ACETAMINOPHEN 1 TABLET: 5; 325 TABLET ORAL at 12:43

## 2023-01-31 RX ADMIN — SODIUM CHLORIDE, POTASSIUM CHLORIDE, SODIUM LACTATE AND CALCIUM CHLORIDE: 600; 310; 30; 20 INJECTION, SOLUTION INTRAVENOUS at 09:15

## 2023-01-31 RX ADMIN — HYDROMORPHONE HYDROCHLORIDE 0.5 MG: 1 INJECTION, SOLUTION INTRAMUSCULAR; INTRAVENOUS; SUBCUTANEOUS at 11:35

## 2023-01-31 RX ADMIN — SODIUM CHLORIDE, POTASSIUM CHLORIDE, SODIUM LACTATE AND CALCIUM CHLORIDE: 600; 310; 30; 20 INJECTION, SOLUTION INTRAVENOUS at 09:14

## 2023-01-31 RX ADMIN — HYDROMORPHONE HYDROCHLORIDE 0.5 MG: 1 INJECTION, SOLUTION INTRAMUSCULAR; INTRAVENOUS; SUBCUTANEOUS at 11:14

## 2023-01-31 RX ADMIN — LIDOCAINE HYDROCHLORIDE 1 ML: 10 INJECTION, SOLUTION EPIDURAL; INFILTRATION; INTRACAUDAL; PERINEURAL at 06:56

## 2023-01-31 RX ADMIN — GABAPENTIN 100 MG: 100 CAPSULE ORAL at 06:55

## 2023-01-31 RX ADMIN — MIDAZOLAM 2 MG: 1 INJECTION INTRAMUSCULAR; INTRAVENOUS at 08:38

## 2023-01-31 RX ADMIN — SUGAMMADEX 200 MG: 100 INJECTION, SOLUTION INTRAVENOUS at 10:32

## 2023-01-31 RX ADMIN — FENTANYL CITRATE 50 MCG: 50 INJECTION, SOLUTION INTRAMUSCULAR; INTRAVENOUS at 08:38

## 2023-01-31 RX ADMIN — EPHEDRINE SULFATE 10 MG: 50 INJECTION INTRAMUSCULAR; INTRAVENOUS; SUBCUTANEOUS at 09:01

## 2023-01-31 RX ADMIN — SODIUM CHLORIDE, POTASSIUM CHLORIDE, SODIUM LACTATE AND CALCIUM CHLORIDE: 600; 310; 30; 20 INJECTION, SOLUTION INTRAVENOUS at 07:02

## 2023-01-31 RX ADMIN — KETOROLAC TROMETHAMINE 30 MG: 30 INJECTION, SOLUTION INTRAMUSCULAR; INTRAVENOUS at 10:21

## 2023-01-31 RX ADMIN — FENTANYL CITRATE 50 MCG: 50 INJECTION, SOLUTION INTRAMUSCULAR; INTRAVENOUS at 09:46

## 2023-01-31 RX ADMIN — ROCURONIUM BROMIDE 10 MG: 10 INJECTION, SOLUTION INTRAVENOUS at 09:55

## 2023-01-31 RX ADMIN — Medication 2000 MG: at 08:53

## 2023-01-31 RX ADMIN — ROCURONIUM BROMIDE 50 MG: 10 INJECTION, SOLUTION INTRAVENOUS at 08:38

## 2023-01-31 RX ADMIN — SODIUM CHLORIDE, POTASSIUM CHLORIDE, SODIUM LACTATE AND CALCIUM CHLORIDE: 600; 310; 30; 20 INJECTION, SOLUTION INTRAVENOUS at 06:59

## 2023-01-31 RX ADMIN — ONDANSETRON 4 MG: 2 INJECTION INTRAMUSCULAR; INTRAVENOUS at 10:38

## 2023-01-31 RX ADMIN — HEPARIN SODIUM 5000 UNITS: 5000 INJECTION INTRAVENOUS; SUBCUTANEOUS at 07:03

## 2023-01-31 RX ADMIN — HYDROMORPHONE HYDROCHLORIDE 0.5 MG: 1 INJECTION, SOLUTION INTRAMUSCULAR; INTRAVENOUS; SUBCUTANEOUS at 11:04

## 2023-01-31 RX ADMIN — PROPOFOL 200 MG: 10 INJECTION, EMULSION INTRAVENOUS at 08:38

## 2023-01-31 RX ADMIN — LIDOCAINE HYDROCHLORIDE 40 MG: 20 INJECTION, SOLUTION EPIDURAL; INFILTRATION; INTRACAUDAL; PERINEURAL at 08:38

## 2023-01-31 RX ADMIN — HYDROMORPHONE HYDROCHLORIDE 0.5 MG: 1 INJECTION, SOLUTION INTRAMUSCULAR; INTRAVENOUS; SUBCUTANEOUS at 11:48

## 2023-01-31 RX ADMIN — LIDOCAINE HYDROCHLORIDE 1 ML: 10 INJECTION, SOLUTION EPIDURAL; INFILTRATION; INTRACAUDAL; PERINEURAL at 07:02

## 2023-01-31 RX ADMIN — ACETAMINOPHEN 1000 MG: 500 TABLET ORAL at 06:55

## 2023-01-31 RX ADMIN — SODIUM CHLORIDE, POTASSIUM CHLORIDE, SODIUM LACTATE AND CALCIUM CHLORIDE: 600; 310; 30; 20 INJECTION, SOLUTION INTRAVENOUS at 07:07

## 2023-01-31 ASSESSMENT — PAIN SCALES - GENERAL
PAINLEVEL_OUTOF10: 10
PAINLEVEL_OUTOF10: 5
PAINLEVEL_OUTOF10: 8
PAINLEVEL_OUTOF10: 0
PAINLEVEL_OUTOF10: 10
PAINLEVEL_OUTOF10: 10

## 2023-01-31 ASSESSMENT — PAIN DESCRIPTION - LOCATION
LOCATION: SCROTUM
LOCATION: ABDOMEN
LOCATION: ABDOMEN
LOCATION: ABDOMEN;PENIS
LOCATION: ABDOMEN

## 2023-01-31 ASSESSMENT — PAIN DESCRIPTION - ORIENTATION
ORIENTATION: MID

## 2023-01-31 ASSESSMENT — PAIN - FUNCTIONAL ASSESSMENT: PAIN_FUNCTIONAL_ASSESSMENT: 0-10

## 2023-01-31 ASSESSMENT — PAIN DESCRIPTION - DESCRIPTORS
DESCRIPTORS: PRESSURE
DESCRIPTORS: ACHING;DULL;PRESSURE
DESCRIPTORS: ACHING;DULL
DESCRIPTORS: ACHING;DULL
DESCRIPTORS: ACHING

## 2023-01-31 ASSESSMENT — PAIN DESCRIPTION - PAIN TYPE
TYPE: SURGICAL PAIN

## 2023-01-31 NOTE — PROGRESS NOTES
Quiroga/leg bag teaching done with patient. Written discharge instructions also included quiroga/leg bag care.  Patient verbalized understanding

## 2023-01-31 NOTE — BRIEF OP NOTE
Brief Postoperative Note      Patient: Keo Pham  YOB: 1950  MRN: [id number]    Date of Procedure: 1/31/2023    Pre-Op Diagnosis: PROSTATE CANCER    Post-Op Diagnosis: Same       Procedure(s):  PROSTATECTOMY LAPAROSCOPIC ROBOTIC XI   WITH  PELVIC LYMPH NODE DISSECTION    Surgeon(s):  Tereza Jay MD    Assistant:  First Assistant: Miky Harmon    Anesthesia: General    Estimated Blood Loss (mL): Minimal    Complications: None    Specimens:   * No specimens in log *    Implants:  * No implants in log *      Drains: * No LDAs found *    Findings: d    Electronically signed by Tereza Jay MD on 1/31/2023 at 8:21 AM

## 2023-01-31 NOTE — ANESTHESIA PRE PROCEDURE
Department of Anesthesiology  Preprocedure Note       Name:  Cipriano Pereira   Age:  67 y.o.  :  1950                                          MRN:  [id number]         Date:  2023      Surgeon: Td Busch):  Zac Martinez MD    Procedure: Procedure(s):  PROSTATECTOMY LAPAROSCOPIC ROBOTIC XI   WITH  PELVIC LYMPH NODE DISSECTION    Medications prior to admission:   Prior to Admission medications    Medication Sig Start Date End Date Taking?  Authorizing Provider   acetaminophen (TYLENOL) 500 MG tablet Take 1,000 mg by mouth every 6 hours as needed for Pain    Historical Provider, MD   tamsulosin (FLOMAX) 0.4 MG capsule Take 1 capsule by mouth in the morning and at bedtime 22   Cyndi King MD   irbesartan (AVAPRO) 150 MG tablet Take 1 tablet by mouth daily 22   Lashaun Hammond MD   dutasteride (AVODART) 0.5 MG capsule take 1 capsule by mouth once daily 22   Cyndi King MD   simvastatin (ZOCOR) 20 MG tablet Take 1 tablet by mouth nightly 5/10/22   Lashaun Hammond MD   CLARITIN-D 24 HOUR  MG per extended release tablet Take 1 tablet by mouth daily 22   Lashaun Hammond MD   Cholecalciferol 2000 units TABS Take 2,000 Units by mouth daily D3    Historical Provider, MD   Glucosamine HCl--250 MG TABS Take 2 tablets by mouth daily Move Free    Historical Provider, MD   pyridostigmine (MESTINON) 60 MG tablet Take 60 mg by mouth 4 times daily     Historical Provider, MD       Current medications:    Current Facility-Administered Medications   Medication Dose Route Frequency Provider Last Rate Last Admin    heparin (porcine) injection 5,000 Units  5,000 Units SubCUTAneous Once Zac Martinez MD        sodium chloride flush 0.9 % injection 5-40 mL  5-40 mL IntraVENous 2 times per day Jennifer Benson MD        sodium chloride flush 0.9 % injection 5-40 mL  5-40 mL IntraVENous PRN Sarwat Monteiro MD        0.9 % sodium chloride infusion   IntraVENous PRN Jennifer Benson MD  lactated ringers IV soln infusion   IntraVENous Continuous Sarwat Arriola MD        lidocaine PF 1 % injection 1 mL  1 mL IntraDERmal Once PRN Raúl Lara MD        acetaminophen (TYLENOL) tablet 1,000 mg  1,000 mg Oral Once Raúl Lara MD        gabapentin (NEURONTIN) capsule 100 mg  100 mg Oral Once Raúl Lara MD           Allergies:     Allergies   Allergen Reactions    Ciprofloxacin Other (See Comments)     Chronic lymph leukemia, , occular miastenia gravis and cipro make it worse         Problem List:    Patient Active Problem List   Diagnosis Code    Microscopic hematuria R31.29    Calculus of kidney N20.0    Hypertrophy of prostate with urinary obstruction and other lower urinary tract symptoms (LUTS) N40.1, N13.8    Chronic low back pain M54.50, G89.29    Chronic lymphocytic leukemia (HCC) C91.10    Herpes zoster B02.9    Acquired spondylolisthesis M43.10    Degeneration of lumbar intervertebral disc M51.36    Dermatochalasis of left upper eyelid H02.834    Dermatochalasis of right upper eyelid H02.831    Essential hypertension I10    Hyperglycemia R73.9    Hyperlipidemia E78.5    Lesion of ulnar nerve G56.20    Localized, primary osteoarthritis of shoulder region M19.019    Lower urinary tract symptoms due to benign prostatic hyperplasia N40.1    Lumbosacral spondylosis without myelopathy M47.817    Nuclear sclerosis of both eyes H25.13    Nuclear senile cataract H25.10    Osteoarthritis of knee M17.9    Pes anserinus tendinitis M76.899    Preglaucoma, unspecified, bilateral H40.003    Rupture of right rotator cuff M75.101    Tear of lateral meniscus of right knee, current S83.281A    History of myasthenia gravis Z86.69    Preop examination Z01.818    Unilateral primary osteoarthritis, right hip M16.11    Unsp athscl native arteries of extremities, right leg ([facility]) I70.201    Pain in right knee M25.561    Pain in right leg M79.604    Primary osteoarthritis, left hand D80.997    Other specified enthesopathies of right lower limb, excluding foot M76.891    Spondylosis without myelopathy or radiculopathy, sacral and sacrococcygeal region M47.818    Unilateral primary osteoarthritis, right knee M17.11    Other specified arthritis, unspecified hand M13.849       Past Medical History:        Diagnosis Date    Arthritis     right shoulder, knee    BPH (benign prostatic hyperplasia)     Caffeine use     2 cups coffee/day    Chronic back pain     Chronic lymphocytic leukemia ([address]     numbers are stable. [facility] Dr. Carter Cheatham. 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[address]     Prostate cancer ([address] 12/2022    PVC's (premature ventricular contractions)     Sciatica     lower back    Vitamin D deficiency     Wears glasses        Past Surgical History:        Procedure Laterality Date    COLONOSCOPY      2 times    CYSTOSCOPY  11/28/2022    EXTRACORPOREAL SHOCK WAVE LITHOTRIPSY  06/20/2016    Procedure: EXTRACORPOREAL SHOCK WAVE LITHOTRIPSY, right needs KUB; Surgeon: Hope Baldwin MD; Location: [facility]; Service: Urology Surgery;  Laterality: Right; MR    EYE SURGERY      30 years ago laser to close holes in retina    KNEE ARTHROSCOPY Bilateral     meniscus    LITHOTRIPSY      3 times    PROSTATE BIOPSY  11/28/2022    PROSTATE BIOPSY N/A 11/28/2022    CYSTOSCOPY,  PROSTATE BIOPSY ULTRASOUND performed by Isaak Corral MD at [address] [place] [address]       Social History:    Social History     Tobacco Use    Smoking status: Former     Types: Pipe, Cigarettes, Cigars    Smokeless tobacco: Never    Tobacco comments:     Smoked a pipe about 1 year in his 19's, also smoked cigarettes in college, smoked x1 cigar in 1992 (quit smoking in 1992). Substance Use Topics    Alcohol use: Not Currently     Alcohol/week: 3.0 standard drinks     Types: 3 Standard drinks or equivalent per week     Comment: 2-3 times a week glass of wine, once in a while a mixed drink                                Counseling given: Not Answered  Tobacco comments: Smoked a pipe about 1 year in his 20's, also smoked cigarettes in college, smoked x1 cigar in 1992 (quit smoking in 1992). Vital Signs (Current): There were no vitals filed for this visit. BP Readings from Last 3 Encounters:   01/18/23 111/73   01/12/23 132/78   11/28/22 127/78       NPO Status:                                                                                 BMI:   Wt Readings from Last 3 Encounters:   01/18/23 170 lb (77.1 kg)   01/12/23 173 lb (78.5 kg)   12/06/22 173 lb (78.5 kg)     There is no height or weight on file to calculate BMI.    CBC:   Lab Results   Component Value Date/Time    WBC 25.2 01/18/2023 01:00 PM    RBC 4.37 01/18/2023 01:00 PM    HGB 13.8 01/18/2023 01:00 PM    HCT 42.4 01/18/2023 01:00 PM    MCV 97.0 01/18/2023 01:00 PM    RDW 13.9 01/18/2023 01:00 PM     01/18/2023 01:00 PM       CMP:   Lab Results   Component Value Date/Time     01/18/2023 01:00 PM    K 5.0 01/18/2023 01:00 PM     01/18/2023 01:00 PM    CO2 28 01/18/2023 01:00 PM    BUN 11 01/18/2023 01:00 PM    CREATININE 0.64 01/18/2023 01:00 PM    GFRAA >60 10/25/2019 03:35 PM    LABGLOM >60 01/18/2023 01:00 PM    GLUCOSE 89 01/18/2023 01:00 PM    CALCIUM 9.1 01/18/2023 01:00 PM       POC Tests: No results for input(s): POCGLU, POCNA, POCK, POCCL, POCBUN, POCHEMO, POCHCT in the last 72 hours.     Coags: No results found for: PROTIME, INR, APTT    HCG (If Applicable): No results found for: PREGTESTUR, PREGSERUM, HCG, HCGQUANT     ABGs: No results found for: PHART, PO2ART, AQV1OVG, YYL1XRU, BEART, T5GOCBBN     Type & Screen (If Applicable):  No results found for: LABABO, LABRH    Drug/Infectious Status (If Applicable):  No results found for: HIV, HEPCAB    COVID-19 Screening (If Applicable): No results found for: COVID19        Anesthesia Evaluation  Patient summary reviewed and Nursing notes reviewed no history of anesthetic complications:   Airway: Mallampati: II  TM distance: >3 FB   Neck ROM: full  Mouth opening: > = 3 FB   Dental: normal exam         Pulmonary:Negative Pulmonary ROS and normal exam  breath sounds clear to auscultation                             Cardiovascular:    (+) hypertension:, dysrhythmias: PVC, hyperlipidemia      ECG reviewed  Rhythm: regular  Rate: normal           Beta Blocker:  Not on Beta Blocker         Neuro/Psych:   (+) neuromuscular disease (Ocular myasthenia gravis ): myasthenia gravis,             GI/Hepatic/Renal:   (+) renal disease (BPH (benign prostatic hyperplasia); Prostate cancer ): kidney stones,           Endo/Other:    (+) : arthritis: OA., malignancy/cancer (Chronic lymphocytic leukemia; Prostate cancer ). Abdominal:             Vascular: negative vascular ROS. Other Findings:           Anesthesia Plan      general     ASA 3     (GETA)  Induction: intravenous. MIPS: Postoperative opioids intended and Prophylactic antiemetics administered. Anesthetic plan and risks discussed with patient. Plan discussed with CRNA.             Patient took his Mestinon this morning        Dann Lynch MD   1/31/2023

## 2023-01-31 NOTE — TELEPHONE ENCOUNTER
Ashley yeboah at short stay called in wanting to know when the patient is to follow up for quiroga cath removal.    Message out to [facility], awaiting response . Braxton Roberts

## 2023-01-31 NOTE — TELEPHONE ENCOUNTER
This is post prostatectomy patient is to keep cath in until follow up on 02/07/2023 with JAYE Muller

## 2023-01-31 NOTE — ANESTHESIA POSTPROCEDURE EVALUATION
Department of Anesthesiology  Postprocedure Note    Patient: Mitch Jasso  MRN: [id number]  YOB: 1950  Date of evaluation: 1/31/2023      Procedure Summary     Date: 01/31/23 Room / Location: [address] [place] [address] / [address]    Anesthesia Start: 8731 Anesthesia Stop: 9951    Procedure: PROSTATECTOMY LAPAROSCOPIC ROBOTIC XI   WITH  PELVIC LYMPH NODE DISSECTION (Abdomen) Diagnosis:       Prostate cancer ([address]      (PROSTATE CANCER)    Surgeons: Bonnie Farris MD Responsible Provider: Kimberley Williamson MD    Anesthesia Type: general ASA Status: 3          Anesthesia Type: No value filed.     Na Phase I: Na Score: 10    Na Phase II: Na Score: 10      Anesthesia Post Evaluation    Comments: POST- ANESTHESIA EVALUATION       Pt Name: Mitch Jasso  MRN: [id number]  YOB: 1950  Date of evaluation: 1/31/2023  Time:  3:37 PM      /69   Pulse 65   Temp 97.3 °F (36.3 °C) (Infrared)   Resp 14   Ht 5' 10.5\" (1.791 m)   Wt 170 lb (77.1 kg)   SpO2 95%   BMI 24.05 kg/m²      Consciousness Level  Awake  Cardiopulmonary Status  Stable  Pain Adequately Treated YES  Nausea / Vomiting  NO  Adequate Hydration  YES  Anesthesia Related Complications NONE      Electronically signed by Kimberley Williamson MD on 1/31/2023 at 3:37 PM

## 2023-01-31 NOTE — OP NOTE
Operative Note      Patient: Trini Reyes  YOB: 1950  MRN: [id number]    Date of Procedure: 1/31/2023    Pre-Op Diagnosis: PROSTATE CANCER    Post-Op Diagnosis: Same       Procedure(s):  PROSTATECTOMY LAPAROSCOPIC ROBOTIC XI   WITH  PELVIC LYMPH NODE DISSECTION    Surgeon(s):  Chelsea Hill MD    Assistant:   First Assistant: Charley Goodwin    Anesthesia: General    Estimated Blood Loss (mL): Minimal    Complications: None    Specimens:   * No specimens in log *    Implants:  * No implants in log *      Drains: * No LDAs found *    Findings: below    Detailed Description of Procedure:   FACILITY:  [address] [place] [address] Street:  none     SURGEON:  KRISTY Apple Marla:  none      PREOPERATIVE DIAGNOSIS:  Prostate cancer. POSTOPERATIVE DIAGNOSIS:  Prostate cancer. PROCEDURE:  Robotic radical prostatectomy with bilateral pelvic lymph node dissection. ANESTHESIA:  General.      COMPLICATIONS:  None. ESTIMATED BLOOD LOSS:  100     INTRAVENOUS FLUIDS:  Crystalloid per anesthesia     DRAINS:  Urethral quiroga catheter        NARRATIVE PROCEDURE IS AS FOLLOWS:  68 yo with raj 7 and 8 prostate cancer. All the options were presented to the patient. The patient decided to proceed with the above procedure. All the risks and benefits were explained to the patient, informed consent was obtained. DESCRIPTION OF PROCEDURE:  This patient was given preoperative anticoagulation and antibiotics and was brought back to the operating room and positioned in the supine position. General anesthesia was started. He was secured to the bed and then he was sterilely prepped and draped in standard fashion. An 25 Armenian Quiroga catheter was placed and a supraumbilical skin incision was made, a Veress needle was placed through this into the peritoneum. Pneumoperitoneum was obtained. An 8 mm port was placed through this incision into the peritoneum. The peritoneum was examined.  No injuries were noted. The rest of the ports were placed in the usual fashion. Three robotic 8 mm ports were placed into assistant, one 12 mm, one 5 mm ports were placed under direct vision. After this the robot was then docked and the procedure was started by reflecting the bladder in the usual fashion by incising lateral to the medial umbilical ligaments and transecting the urachus. The fat off the anterior aspect of the prostate was excised. Endopelvic fascia on each side was incised. The dorsal vein was ligated with a figure-of-8 stitch of 0-V-Loc. This was pexed to the pubic bone. I then transected the bladder making sure not to enter the prostate and making sure not to enter theureteral orifices. The anterior Denonvilliers fascia was identified, it was incised. The seminal vesicles and vasa were dissected and lifted anteriorly. The posterior Denonvilliers fascia was incised and the rectum was swept off the posterior lateral aspect of the prostate. The prostatic  pedicles were ligated and divided with Hem-o-Az clips and we performed mod standard nerve dissection. This progressed on both sides towards the apex of the prostate, then the dorsal venous complex was incised, the urethra was dissected and then transected maintaining urethral length. The rectourethralis was transected, the prostate was free and the pelvis was irrigated, no injuries were noted. There was adequate hemostasis. The right pelvic lymph node dissection was performed by sampling lymph nodes between the iliac vein and the obturator nerve, making sure not to injure the structures, the same was on the left side by sampling the nodes between the iliac vein and the obturator nerve, making sure not to injure the structures.  After this the lymph nodes and the prostate were placed into an EndoCatch bag and then the Leon stitch was performed with a 3-0 Monocryl and a figure-of-8 stitch manner, bringing together posterior Denonvilliers fascia and the rectourethralis was tied down. The bladder urethra anastomosis was performed with a 2-0 quill stitch in a running fashion. This was tied down and new Cid catheter was placed. The bladder was irrigated, there was no extravasation noted. We decided to not leave a drain. Then the robot was undocked, the umbilical skin incision was elongated, the specimen bag was removed through this incision, this incision was closed with figure-of-8 stitches of 0-Vicryl. All the skin incisions were closed with 4-0 Monocryl. Dermabond was placed and that was the end of the  procedure. The patient will be admitted for routine postoperative care. Dr. Demond Dozier was present and scrubbed throughout the entire case.      Electronically signed by Montana Soliz MD on 1/31/2023 at 8:23 AM

## 2023-01-31 NOTE — DISCHARGE INSTRUCTIONS
DISCHARGE INSTRUCTIONS FOR ABDOMINAL SURGERY    In order to continue your care at home, please follow the instructions below. For General Anesthesia  Do not drink any alcoholic beverages or make any legal or important decisions for 24 hours. Diet    Drink plenty of fluids after surgery, unless you are on a fluid restriction. After general anesthesia, start out eating lightly (broth, soup, crackers, toast, etc.) advancing as tolerated to your usual diet. Try to avoid spicy or greasy/fatty foods for 24 hours. Avoid milk/milk product for several hours. You may notice that your bowel movements are not regular right after your surgery. This is common. Avoid constipation and straining with bowel movements. You may want to take a fiber supplement every day. If you have not had a bowel movement after a couple of days, ask your doctor about taking a mild laxative. Medications  Take medications as instructed by your surgeon. Please do not take prescribed pain medication with alcoholic beverages. When cleared to drive by your surgeon, please do not drive or operate machinery while taking any prescribed pain medication. Activities  As instructed by your surgeon. Try to walk each day. Start by walking a little more than you did the day before. Bit by bit, increase the amount you walk. Walking boosts blood flow and helps prevent pneumonia and constipation. When getting out of bed, bend your knees up, roll to your side, and push on the bed with your arms to push yourself up sideways. Avoid strenuous activities, such as biking, jogging, weight lifting, or aerobic exercise, until your doctor says it is okay. Avoid heavy work or sports until surgeon approves. Avoid lifting anything that would make you strain until surgeon approves. This may include heavy grocery bags and milk containers, a heavy briefcase or backpack, cat litter or dog food bags, a vacuum , or a child.   No driving or operating machinery until cleared by surgeon. May shower tomorrow if incision was closed with surgical glue, otherwise may shower after dressings are removed. No soaking tub baths until surgeon approves. Surgery Area  Always keep dressings/incisions clean and dry  If covered with a dressing, you may remove it as instructed by surgeon. Apply ice pack 20-30 min 4 times a day for 48 hours to help decrease swelling and pain. Make sure to have a piece of cloth between the ice bag and your skin. Hold a pillow over your incision when you cough or take deep breaths. This will support your belly and decrease your pain. Do breathing exercises at home as instructed by your doctor. This will help prevent pneumonia. If you had laparoscopic surgery, you may also have pain in your left shoulder. The pain usually lasts about a day or two. Call your surgeon for the following: You have pain that does not get better after you take pain medicine. For an oral temperature (by mouth) is 101 degrees or higher, chills, or excessive sweating. You have increasing and progressive bleeding or drainage from surgery site. You have loose stiches, or your incision comes open. Signs of an infection:  increased swelling, redness, warmth, or hardness around surgery area or yellow or green drainage from incision. Persistent nausea or vomiting and cant keep fluids down. You have signs of a blood clot, such as: Pain in your calf, back of the knee, thigh, or groin. Redness and swelling in your leg or groin. If you have trouble passing urine or stool, especially if you have mild pain or swelling in your lower belly. Redness or swelling at IV site. For any questions or concerns you may have.

## 2023-01-31 NOTE — INTERVAL H&P NOTE
Update History & Physical    The patient's History and Physical of January 18, 2023 was reviewed with the patient and I examined the patient. There was no change. I concur with findings. Here today for PROSTATECTOMY LAPAROSCOPIC ROBOTIC XI WITH PELVIC LYMPH NODE DISSECTION per Dr. Kennedy Cook. Pt AAO x 3 in NAD. HRRR. LS CTA in all lung fields bilaterally. No respiratory distress. NPO p MN. Took mestinon and avapro this am with sip of water. Denies taking any anticoagulants or blood thinning medications. Denies recent or current chest pain/pressure, palpitations, SOB, recent URI, fever or chills. Review vitals per RN flowsheet.         Electronically signed by ALONDRA Oneill CNP on 1/31/2023 at 6:07 AM

## 2023-02-01 ENCOUNTER — TELEPHONE (OUTPATIENT)
Dept: UROLOGY | Age: 73
End: 2023-02-01

## 2023-02-01 LAB
MICROORGANISM SPEC CULT: NO GROWTH
SPECIMEN DESCRIPTION: NORMAL

## 2023-02-01 NOTE — TELEPHONE ENCOUNTER
Patient left message on clinic line stating \" I had a prostatectomy yesterday and have a catheter, I notice leaking around the tip of my penis. Is this normal?\"    Writer called patient and let him know that he is most likely having bladder spasms which can cause leaking. Patient verbalized understanding and was informed to contact the office if any changes. Call ended.

## 2023-02-02 LAB — SURGICAL PATHOLOGY REPORT: NORMAL

## 2023-02-07 ENCOUNTER — HOSPITAL ENCOUNTER (OUTPATIENT)
Dept: GENERAL RADIOLOGY | Age: 73
Discharge: HOME | End: 2023-02-09
Payer: MEDICARE

## 2023-02-07 ENCOUNTER — OFFICE VISIT (OUTPATIENT)
Dept: UROLOGY | Age: 73
End: 2023-02-07

## 2023-02-07 ENCOUNTER — TELEPHONE (OUTPATIENT)
Dept: UROLOGY | Age: 73
End: 2023-02-07

## 2023-02-07 VITALS
DIASTOLIC BLOOD PRESSURE: 80 MMHG | WEIGHT: 170 LBS | RESPIRATION RATE: 16 BRPM | HEART RATE: 72 BPM | TEMPERATURE: 97.7 F | HEIGHT: 70 IN | BODY MASS INDEX: 24.34 KG/M2 | SYSTOLIC BLOOD PRESSURE: 120 MMHG

## 2023-02-07 DIAGNOSIS — C61 PROSTATE CANCER (HCC): ICD-10-CM

## 2023-02-07 DIAGNOSIS — C61 PROSTATE CANCER (HCC): Primary | ICD-10-CM

## 2023-02-07 DIAGNOSIS — Z90.79 HISTORY OF ROBOT-ASSISTED LAPAROSCOPIC RADICAL PROSTATECTOMY: ICD-10-CM

## 2023-02-07 PROCEDURE — 99024 POSTOP FOLLOW-UP VISIT: CPT | Performed by: NURSE PRACTITIONER

## 2023-02-07 PROCEDURE — 74430 CONTRAST X-RAY BLADDER: CPT

## 2023-02-07 PROCEDURE — 6360000002 HC RX W HCPCS: Performed by: UROLOGY

## 2023-02-07 RX ADMIN — DIATRIZOATE MEGLUMINE 300 ML: 180 INJECTION, SOLUTION INTRAVESICAL at 10:00

## 2023-02-07 ASSESSMENT — ENCOUNTER SYMPTOMS
SHORTNESS OF BREATH: 0
WHEEZING: 0
DIARRHEA: 0
BACK PAIN: 0
COUGH: 0
EYE PAIN: 0
VOMITING: 0
NAUSEA: 0
ABDOMINAL PAIN: 0
CONSTIPATION: 0

## 2023-02-07 NOTE — TELEPHONE ENCOUNTER
Received labs from .- was a potassium and urine culture. Unfortunately, this is not what I need.  Please reach out to [facility] lab to ask about the pathology report- perhaps it was sent to another hospital?

## 2023-02-07 NOTE — TELEPHONE ENCOUNTER
Can you please call the [facility] lab and ask about patient's path report from prostatectomy. His surgery was 1/31/23, and it shows collected but not the results. Usually it has finalized by now. Thanks!

## 2023-02-07 NOTE — PROGRESS NOTES
Review of Systems   Constitutional:  Negative for appetite change, chills, fatigue and fever. Eyes:  Negative for pain and visual disturbance. Respiratory:  Negative for cough, shortness of breath and wheezing. Cardiovascular:  Negative for chest pain and leg swelling. Gastrointestinal:  Negative for abdominal pain, constipation, diarrhea, nausea and vomiting. Genitourinary:  Negative for difficulty urinating, dysuria, frequency, hematuria, penile pain and testicular pain. Musculoskeletal:  Negative for back pain and myalgias. Neurological:  Negative for dizziness, tremors, weakness, light-headedness, numbness and headaches. Hematological:  Negative for adenopathy. Does not bruise/bleed easily.   Patient states leakage

## 2023-02-07 NOTE — TELEPHONE ENCOUNTER
Called patient to report path results, Omar 7 with seminal vesicle invasion and perineural invasion, margins and lymph nodes negative. patient verbalized understanding. He will f/u 6 weeks with PSA.

## 2023-02-07 NOTE — PROGRESS NOTES
[address] [address]  Dept: [facility] Urology Office Note - Established    Patient:  Owen Matta  YOB: 1950  Date: 2/7/2023    The patient is a 67 y.o. male who presents todayfor evaluation of the following problems:   Chief Complaint   Patient presents with    Follow-up     S/p Prostatectomy        HPI  This is a 67 y.o. male with prostate cancer presenting for post-op follow up. He had RRP 1/31/23 with Dr. Kirsten Boyle for Omar 4+4=8. His final pathology report is pending. Patient had cystogram this morning, his catheter is out. He has keflex prescribed x3 days with cath removal.   He admits he did not take as prescribed but will start today and complete the course. Pain is well controlled. He did take norco for 5 days post-op. He has not required any additional OTC analgesics for pain. Denies any post-operative chest pain, SOB, leg pain/swelling, abdominal pain. He was having issues with constipation, was taking the stool softener. Started to eat prunes and has had regular BM since Saturday. Showering daily, denies any signs/symptoms of post-op infection. He has friends helping him care for his mom, following activity restriction. Denies any other concerns today    Summary of old records: N/A    Additional History: N/A    Procedures Today: N/A    Urinalysis today:  No results found for this visit on 02/07/23.   Last several PSA's:  Lab Results   Component Value Date    PSA 3.78 08/08/2022    PSA 4.28 (H) 07/21/2022    PSA 2.45 07/14/2021     Last total testosterone:  No results found for: TESTOSTERONE      Last BUN and creatinine:  Lab Results   Component Value Date    BUN 11 01/18/2023     Lab Results   Component Value Date    CREATININE 0.64 (L) 01/18/2023       Additional Lab/Culture results: none    Imaging Reviewed during this Office Visit: none  (results were independently reviewed by physician and radiology report verified)    PAST MEDICAL, FAMILY AND SOCIAL HISTORY UPDATE:  Past Medical History:   Diagnosis Date    Arthritis     right shoulder, knee    BPH (benign prostatic hyperplasia)     Caffeine use     2 cups coffee/day    Chronic back pain     Chronic lymphocytic leukemia ([address]     numbers are stable. [facility] Dr. Lenora Pinto. last appt 2020    COVID-19 10/22/2022    body aches, sinus congestion x 3 days, felt \"normal\" by day 5    Elevated PSA     History of myasthenia gravis     occular    Hyperlipidemia     Hypertension     Hypoglycemia     Kidney stones     Lightheadedness     Patient states dizziness/lightheadedness w/hypoglycemia    Ocular myasthenia gravis ([address]     Prostate cancer ([address] 12/2022    PVC's (premature ventricular contractions)     Sciatica     lower back    Vitamin D deficiency     Wears glasses      Past Surgical History:   Procedure Laterality Date    COLONOSCOPY      2 times    CYSTOSCOPY  11/28/2022    EXTRACORPOREAL SHOCK WAVE LITHOTRIPSY  06/20/2016    Procedure: EXTRACORPOREAL SHOCK WAVE LITHOTRIPSY, right needs KUB; Surgeon: Aamir Comer MD; Location: [facility]; Service: Urology Surgery;  Laterality: Right; MR    EYE SURGERY      30 years ago laser to close holes in retina    KNEE ARTHROSCOPY Bilateral     meniscus    LITHOTRIPSY      3 times    PROSTATE BIOPSY  11/28/2022    PROSTATE BIOPSY N/A 11/28/2022    CYSTOSCOPY,  PROSTATE BIOPSY ULTRASOUND performed by Sierra Parks MD at [place] [address] N/A 1/31/2023    PROSTATECTOMY LAPAROSCOPIC ROBOTIC XI   WITH  PELVIC LYMPH NODE DISSECTION performed by Matt Kirk MD at [address]     Family History   Problem Relation Age of Onset    Glaucoma Mother     Other Mother         TIA x3, appendix removed, hysterectomy    Memory Loss Mother     Valvular Heart Disease Mother         Valve \"leaks a little bit\"    Dementia Father Prostate Cancer Brother     Other Maternal Grandfather         Smoker    Cancer Maternal Grandfather     Heart Attack Maternal Uncle 77        COD     Outpatient Medications Marked as Taking for the 2/7/23 encounter (Office Visit) with ALONDRA Man CNP   Medication Sig Dispense Refill    cephALEXin (KEFLEX) 500 MG capsule Take 1 capsule by mouth 3 times daily To start day before cath removal 9 capsule 0    acetaminophen (TYLENOL) 500 MG tablet Take 1,000 mg by mouth every 6 hours as needed for Pain      irbesartan (AVAPRO) 150 MG tablet Take 1 tablet by mouth daily 30 tablet 11    simvastatin (ZOCOR) 20 MG tablet Take 1 tablet by mouth nightly 90 tablet 3    CLARITIN-D 24 HOUR  MG per extended release tablet Take 1 tablet by mouth daily 301 tablet 11    Cholecalciferol 2000 units TABS Take 2,000 Units by mouth daily D3      Glucosamine HCl--250 MG TABS Take 2 tablets by mouth daily Move Free      pyridostigmine (MESTINON) 60 MG tablet Take 60 mg by mouth 4 times daily          Ciprofloxacin  Social History     Tobacco Use   Smoking Status Former    Types: Pipe, Cigarettes, Cigars   Smokeless Tobacco Never   Tobacco Comments    Smoked a pipe about 1 year in his 19's, also smoked cigarettes in college, smoked x1 cigar in 1992 (quit smoking in 1992). (Ifpatient a smoker, smoking cessation counseling offered)    Social History     Substance and Sexual Activity   Alcohol Use Not Currently    Alcohol/week: 3.0 standard drinks    Types: 3 Standard drinks or equivalent per week    Comment: 2-3 times a week glass of wine, once in a while a mixed drink       REVIEW OF SYSTEMS:  Review of Systems    Physical Exam:      Vitals:    02/07/23 1049   BP: 120/80   Pulse: 72   Resp: 16   Temp: 97.7 °F (36.5 °C)     Body mass index is 24.39 kg/m². Patient is a 67 y.o. male in no acute distress and alert and oriented to person, place and time.   Physical Exam  Constitutional: Patient in no acute distress. Neuro: Alert and oriented to person, place and time. Psych: Mood normal, affect normal  Lungs: Respiratory effort is normal  Cardiovascular: Warm & Pink  Abdomen: Soft, non-tender, non-distended   Bladder non-tender and not distended. abd surgical incisions clean and dry. Well approximated with dermabond intact. No surrounding erythema, edema, induration, warmth, tenderness, or drainage to indicate infection. Musculoskeletal: Normal gait and station      Assessment and Plan      1. Prostate cancer ([address]    2. History of robot-assisted laparoscopic radical prostatectomy           Plan:   1 week s/p RRP for treatment of prostate cancer. Final pathology is still pending. Office staff reached out to the lab, they sent potassium level and urine culture, not the path report. I will have the MA call them again to check on the status. Patient was encouraged to call office tomorrow afternoon or Thursday morning to f/u on those results, he is agreeable. Complete entire course of keflex, as ordered. Catheter is out, resume kegels. He is not interested in pelvic floor therapy. May stop flomax and dutasteride. He is not interested in ED treatment- hold off on oral meds, DEEP, or discussion of penile injection therapy at this point. Continue post-op activity restrictions:  No driving  for first 48 hr following procedure and no driving while taking narcotic prescription pain medication. No heavy lifting >10 lbs for 4-6wks     Notify urology of any of the following: There is bleeding from the urethra that will not stop  There is redness and swelling in the groin or abdomen  There is foul smelling drainage form the incision  There is temperature over 100.5 degrees  Pain is not controlled by the above instruction  Unable to drink or keep any fluids down or cannot urinate    Will repeat PSA in 6 weeks with f/u in the office.      Patient encouraged to call or return to office with any questions or concerns. Return in about 6 weeks (around 3/21/2023) for PSA on Dr. Contreras Ramos day. Prescriptions Ordered:  No orders of the defined types were placed in this encounter. Orders Placed:  Orders Placed This Encounter   Procedures    PSA, Diagnostic     Standing Status:   Future     Standing Expiration Date:   8/7/2024             ALONDRA Freeman CNP    Reviewed and agree with the ROS entered by the MA.

## 2023-02-16 PROBLEM — Z01.818 PREOP EXAMINATION: Status: RESOLVED | Noted: 2023-01-17 | Resolved: 2023-02-16

## 2023-03-03 RX ORDER — CEPHALEXIN 500 MG/1
CAPSULE ORAL
Qty: 9 CAPSULE | Refills: 0 | OUTPATIENT
Start: 2023-03-03

## 2023-03-10 ENCOUNTER — HOSPITAL ENCOUNTER (OUTPATIENT)
Age: 73
Discharge: HOME | End: 2023-03-10
Payer: MEDICARE

## 2023-03-10 DIAGNOSIS — C61 PROSTATE CANCER (HCC): ICD-10-CM

## 2023-03-10 PROCEDURE — 84153 ASSAY OF PSA TOTAL: CPT

## 2023-03-10 PROCEDURE — 36415 COLL VENOUS BLD VENIPUNCTURE: CPT

## 2023-03-11 LAB — PROSTATE SPECIFIC ANTIGEN: <0.02 NG/ML

## 2023-03-16 ENCOUNTER — APPOINTMENT (OUTPATIENT)
Dept: CT IMAGING | Age: 73
End: 2023-03-16
Payer: MEDICARE

## 2023-03-16 ENCOUNTER — TELEPHONE (OUTPATIENT)
Dept: UROLOGY | Age: 73
End: 2023-03-16

## 2023-03-16 ENCOUNTER — HOSPITAL ENCOUNTER (EMERGENCY)
Age: 73
Discharge: HOME | End: 2023-03-17
Attending: STUDENT IN AN ORGANIZED HEALTH CARE EDUCATION/TRAINING PROGRAM
Payer: MEDICARE

## 2023-03-16 VITALS
DIASTOLIC BLOOD PRESSURE: 75 MMHG | RESPIRATION RATE: 17 BRPM | HEART RATE: 59 BPM | HEIGHT: 70 IN | BODY MASS INDEX: 23.62 KG/M2 | TEMPERATURE: 98.7 F | SYSTOLIC BLOOD PRESSURE: 124 MMHG | WEIGHT: 165 LBS | OXYGEN SATURATION: 99 %

## 2023-03-16 DIAGNOSIS — N20.0 KIDNEY STONE: Primary | ICD-10-CM

## 2023-03-16 LAB
ABSOLUTE EOS #: 0 K/UL (ref 0–0.4)
ABSOLUTE LYMPH #: 18.67 K/UL (ref 1–4.8)
ABSOLUTE MONO #: 0.68 K/UL (ref 0.1–1.3)
ANION GAP SERPL CALCULATED.3IONS-SCNC: 8 MMOL/L (ref 9–17)
BASOPHILS # BLD: 0 % (ref 0–2)
BASOPHILS ABSOLUTE: 0 K/UL (ref 0–0.2)
BILIRUBIN URINE: NEGATIVE
BUN SERPL-MCNC: 17 MG/DL (ref 8–23)
CALCIUM SERPL-MCNC: 9.1 MG/DL (ref 8.6–10.4)
CHLORIDE SERPL-SCNC: 107 MMOL/L (ref 98–107)
CO2 SERPL-SCNC: 28 MMOL/L (ref 20–31)
COLOR: YELLOW
CREAT SERPL-MCNC: 0.69 MG/DL (ref 0.7–1.2)
EOSINOPHILS RELATIVE PERCENT: 0 % (ref 0–4)
GFR SERPL CREATININE-BSD FRML MDRD: >60 ML/MIN/1.73M2
GLUCOSE SERPL-MCNC: 149 MG/DL (ref 70–99)
GLUCOSE UR STRIP.AUTO-MCNC: NEGATIVE MG/DL
HCT VFR BLD AUTO: 40.8 % (ref 41–53)
HGB BLD-MCNC: 13.5 G/DL (ref 13.5–17.5)
KETONES UR STRIP.AUTO-MCNC: NEGATIVE MG/DL
LEUKOCYTE ESTERASE UR QL STRIP.AUTO: ABNORMAL
LYMPHOCYTES # BLD: 83 % (ref 24–44)
MCH RBC QN AUTO: 31.6 PG (ref 26–34)
MCHC RBC AUTO-ENTMCNC: 33 G/DL (ref 31–37)
MCV RBC AUTO: 95.7 FL (ref 80–100)
MONOCYTES # BLD: 3 % (ref 1–7)
MORPHOLOGY: NORMAL
NITRITE UR QL STRIP.AUTO: NEGATIVE
PDW BLD-RTO: 13.8 % (ref 11.5–14.9)
PLATELET # BLD AUTO: 152 K/UL (ref 150–450)
PMV BLD AUTO: 8.4 FL (ref 6–12)
POTASSIUM SERPL-SCNC: 4.7 MMOL/L (ref 3.7–5.3)
PROT UR STRIP.AUTO-MCNC: 6 MG/DL (ref 5–8)
PROT UR STRIP.AUTO-MCNC: NEGATIVE MG/DL
RBC # BLD: 4.26 M/UL (ref 4.5–5.9)
SEG NEUTROPHILS: 14 % (ref 36–66)
SEGMENTED NEUTROPHILS ABSOLUTE COUNT: 3.15 K/UL (ref 1.3–9.1)
SODIUM SERPL-SCNC: 143 MMOL/L (ref 135–144)
SPECIFIC GRAVITY UA: 1.08 (ref 1–1.03)
TURBIDITY: CLEAR
URINE HGB: NEGATIVE
UROBILINOGEN, URINE: NORMAL
WBC # BLD AUTO: 22.5 K/UL (ref 3.5–11)

## 2023-03-16 PROCEDURE — 6370000000 HC RX 637 (ALT 250 FOR IP): Performed by: STUDENT IN AN ORGANIZED HEALTH CARE EDUCATION/TRAINING PROGRAM

## 2023-03-16 PROCEDURE — 87077 CULTURE AEROBIC IDENTIFY: CPT

## 2023-03-16 PROCEDURE — 6360000004 HC RX CONTRAST MEDICATION: Performed by: STUDENT IN AN ORGANIZED HEALTH CARE EDUCATION/TRAINING PROGRAM

## 2023-03-16 PROCEDURE — 2580000003 HC RX 258: Performed by: STUDENT IN AN ORGANIZED HEALTH CARE EDUCATION/TRAINING PROGRAM

## 2023-03-16 PROCEDURE — 87186 SC STD MICRODIL/AGAR DIL: CPT

## 2023-03-16 PROCEDURE — 99285 EMERGENCY DEPT VISIT HI MDM: CPT

## 2023-03-16 PROCEDURE — 74177 CT ABD & PELVIS W/CONTRAST: CPT

## 2023-03-16 PROCEDURE — 80048 BASIC METABOLIC PNL TOTAL CA: CPT

## 2023-03-16 PROCEDURE — 81001 URINALYSIS AUTO W/SCOPE: CPT

## 2023-03-16 PROCEDURE — 87086 URINE CULTURE/COLONY COUNT: CPT

## 2023-03-16 PROCEDURE — 36415 COLL VENOUS BLD VENIPUNCTURE: CPT

## 2023-03-16 PROCEDURE — 85025 COMPLETE CBC W/AUTO DIFF WBC: CPT

## 2023-03-16 RX ORDER — SODIUM CHLORIDE 0.9 % (FLUSH) 0.9 %
10 SYRINGE (ML) INJECTION PRN
Status: DISCONTINUED | OUTPATIENT
Start: 2023-03-16 | End: 2023-03-17 | Stop reason: HOSPADM

## 2023-03-16 RX ORDER — 0.9 % SODIUM CHLORIDE 0.9 %
100 INTRAVENOUS SOLUTION INTRAVENOUS ONCE
Status: COMPLETED | OUTPATIENT
Start: 2023-03-16 | End: 2023-03-17

## 2023-03-16 RX ORDER — CEPHALEXIN 500 MG/1
500 CAPSULE ORAL 4 TIMES DAILY
Qty: 40 CAPSULE | Refills: 0 | Status: SHIPPED | OUTPATIENT
Start: 2023-03-16 | End: 2023-03-26

## 2023-03-16 RX ORDER — DICYCLOMINE HYDROCHLORIDE 10 MG/1
10 CAPSULE ORAL ONCE
Status: COMPLETED | OUTPATIENT
Start: 2023-03-16 | End: 2023-03-16

## 2023-03-16 RX ORDER — TAMSULOSIN HYDROCHLORIDE 0.4 MG/1
0.4 CAPSULE ORAL DAILY
Qty: 5 CAPSULE | Refills: 0 | Status: SHIPPED | OUTPATIENT
Start: 2023-03-16 | End: 2023-03-21

## 2023-03-16 RX ADMIN — IOPAMIDOL 75 ML: 755 INJECTION, SOLUTION INTRAVENOUS at 21:59

## 2023-03-16 RX ADMIN — SODIUM CHLORIDE, PRESERVATIVE FREE 10 ML: 5 INJECTION INTRAVENOUS at 21:59

## 2023-03-16 RX ADMIN — SODIUM CHLORIDE 100 ML: 9 INJECTION, SOLUTION INTRAVENOUS at 21:59

## 2023-03-16 RX ADMIN — DICYCLOMINE HYDROCHLORIDE 10 MG: 10 CAPSULE ORAL at 20:50

## 2023-03-16 ASSESSMENT — PAIN SCALES - GENERAL
PAINLEVEL_OUTOF10: 9
PAINLEVEL_OUTOF10: 9

## 2023-03-16 ASSESSMENT — PAIN DESCRIPTION - ORIENTATION
ORIENTATION: LEFT
ORIENTATION: LEFT

## 2023-03-16 ASSESSMENT — ENCOUNTER SYMPTOMS
ABDOMINAL PAIN: 1
CONSTIPATION: 0
DIARRHEA: 0
NAUSEA: 0
VOMITING: 0

## 2023-03-16 ASSESSMENT — PAIN DESCRIPTION - LOCATION
LOCATION: GROIN
LOCATION: GROIN

## 2023-03-16 ASSESSMENT — LIFESTYLE VARIABLES
HOW OFTEN DO YOU HAVE A DRINK CONTAINING ALCOHOL: 2-4 TIMES A MONTH
HOW MANY STANDARD DRINKS CONTAINING ALCOHOL DO YOU HAVE ON A TYPICAL DAY: 1 OR 2

## 2023-03-16 ASSESSMENT — PAIN - FUNCTIONAL ASSESSMENT: PAIN_FUNCTIONAL_ASSESSMENT: 0-10

## 2023-03-16 ASSESSMENT — PAIN DESCRIPTION - DESCRIPTORS: DESCRIPTORS: STABBING

## 2023-03-16 ASSESSMENT — PAIN DESCRIPTION - PAIN TYPE: TYPE: ACUTE PAIN

## 2023-03-16 NOTE — ED TRIAGE NOTES
Mode of arrival (squad #, walk in, police, etc) : Walk in        Chief complaint(s): Left Groin Pain        Arrival Note (brief scenario, treatment PTA, etc). : Patient to ED complaining of stabbing pain on the left pelvic area. Patient declares he has ha a prostatectomy perfomed on 1/31/23. He states he has had similar pain since the surgery, but they have been ongoing consistently today. Denies N/V, or constipation. Patient alert and oriented x 4.        C= \"Have you ever felt that you should Cut down on your drinking? \"  No  A= \"Have people Annoyed you by criticizing your drinking? \"  No  G= \"Have you ever felt bad or Guilty about your drinking? \"  No  E= \"Have you ever had a drink as an Eye-opener first thing in the morning to steady your nerves or to help a hangover? \"  No      Deferred []      Reason for deferring: N/A    *If yes to two or more: probable alcohol abuse. *

## 2023-03-16 NOTE — TELEPHONE ENCOUNTER
Writer received a call from [facility]. They were needing patient's insurance information. Information was given. They then stated \"We are unable to service this patient due to medicare coverage.

## 2023-03-17 LAB
BACTERIA: ABNORMAL
EPITHELIAL CELLS UA: ABNORMAL /HPF
RBC CLUMPS #/AREA URNS AUTO: ABNORMAL /HPF
WBC UA: ABNORMAL /HPF

## 2023-03-17 PROCEDURE — 96374 THER/PROPH/DIAG INJ IV PUSH: CPT

## 2023-03-17 PROCEDURE — 6360000002 HC RX W HCPCS: Performed by: STUDENT IN AN ORGANIZED HEALTH CARE EDUCATION/TRAINING PROGRAM

## 2023-03-17 PROCEDURE — 2580000003 HC RX 258: Performed by: STUDENT IN AN ORGANIZED HEALTH CARE EDUCATION/TRAINING PROGRAM

## 2023-03-17 RX ADMIN — CEFTRIAXONE SODIUM 1000 MG: 1 INJECTION, POWDER, FOR SOLUTION INTRAMUSCULAR; INTRAVENOUS at 00:17

## 2023-03-17 NOTE — ED NOTES
Report given to Vahe Ortiz from ED. Report method in person   The following was reviewed with receiving RN:   Current vital signs:  /75   Pulse 59   Temp 98.7 °F (37.1 °C) (Oral)   Resp 17   Ht 5' 10\" (1.778 m)   Wt 165 lb (74.8 kg)   SpO2 99%   BMI 23.68 kg/m²                MEWS Score: 1     Any medication or safety alerts were reviewed. Any pending diagnostics and notifications were also reviewed, as well as any safety concerns or issues, abnormal labs, abnormal imaging, and abnormal assessment findings. Questions were answered.             Rod Perry RN  03/16/23 7560

## 2023-03-17 NOTE — ED PROVIDER NOTES
EMERGENCY DEPARTMENT ENCOUNTER   ATTENDING ATTESTATION     Pt Name: Vladimir Grace  MRN: [id number]  [place] 1950  Date of evaluation: 3/16/23       Vladimir Grace is a 67 y.o. male who presents with Groin Pain (Left side)    Prostatectomy in January    Having some LLQ abdominal pain and dysuria    Labs and CT    MDM:     Work-up reveals a 3 mm stone in the ureter    Otherwise unremarkable CT    Does have a leukocytosis but no fever normal vital signs going on for couple weeks now    We discussed with urology they are recommending discharge home with oral antibiotics we will follow-up in their clinic    Return precautions given    Vitals:   Vitals:    03/16/23 1941   BP: 124/75   Pulse: 59   Resp: 17   Temp: 98.7 °F (37.1 °C)   TempSrc: Oral   SpO2: 99%   Weight: 165 lb (74.8 kg)   Height: 5' 10\" (1.778 m)         I personally saw and examined the patient. I have reviewed and agree with the resident's findings, including all diagnostic interpretations and treatment plan as written. I was present for the key portions of any procedures performed and the inclusive time noted for any critical care statement.     Heather Boateng MD  Attending Emergency Physician           Heather Boateng MD  03/17/23 0123

## 2023-03-17 NOTE — DISCHARGE INSTRUCTIONS
Thank you for coming to [facility] emergency department! You were seen today for abdominal pain, you were diagnosed with kidney stone. You were prescribed keflex and flomax, please pick these medications up at the pharmacy. Follow up with your primary care doctor. If you have any worsening of symptoms or any other concerns, please return to the emergency department. We are always available!

## 2023-03-17 NOTE — ED PROVIDER NOTES
[address]  Emergency Department Encounter  Emergency Medicine Resident     Pt Name:Chase Quach  MRN: [id number]  [place] 1950  Date of evaluation: 3/16/23  PCP:  Blanca Almeida MD  Note Started: 8:31 PM EDT      CHIEF COMPLAINT       Chief Complaint   Patient presents with    Groin Pain     Left side       HISTORY OF PRESENT ILLNESS  (Location/Symptom, Timing/Onset, Context/Setting, Quality, Duration, Modifying Factors, Severity.)      Patric Jay is a 67 y.o. male who presents with left lower quadrant pain, worse today. Patient reports that he had prostatectomy in January and that since then he has intermittent left lower quadrant pain however it felt more like cramping and today it felt more sharp. He does report some burning with urination. Denies any issues with bowel movements. Denies any constipation or diarrhea. Denies any fevers since his operation. Is concerned that he is still requiring diapers due to incontinence of urine, has noticed some swelling of his perineum that he was told may be normal after his procedure. Has not been seen by his surgeon since the procedure was done. Did see the nurse practitioner in clinic. Is going to be following up with a different surgeon at the [facility]. PAST MEDICAL / SURGICAL / SOCIAL / FAMILY HISTORY      has a past medical history of Arthritis, BPH (benign prostatic hyperplasia), Caffeine use, Chronic back pain, Chronic lymphocytic leukemia ([address] COVID-19, Elevated PSA, History of myasthenia gravis, Hyperlipidemia, Hypertension, Hypoglycemia, Kidney stones, Lightheadedness, Ocular myasthenia gravis ([address] Prostate cancer ([address] PVC's (premature ventricular contractions), Sciatica, Vitamin D deficiency, and Wears glasses. has a past surgical history that includes Tonsillectomy and adenoidectomy (1959); Knee arthroscopy (Bilateral); Colonoscopy; Lithotripsy; Cystoscopy (11/28/2022);  Prostate biopsy (11/28/2022); Prostate biopsy (N/A, 11/28/2022); eye surgery; Extracorporeal shock wave lithotripsy (06/20/2016); and Prostatectomy (N/A, 1/31/2023). Social History     Socioeconomic History    Marital status: Single     Spouse name: Not on file    Number of children: Not on file    Years of education: Not on file    Highest education level: Not on file   Occupational History    Not on file   Tobacco Use    Smoking status: Former     Types: Pipe, Cigarettes, Cigars    Smokeless tobacco: Never    Tobacco comments:     Smoked a pipe about 1 year in his 19's, also smoked cigarettes in college, smoked x1 cigar in 1992 (quit smoking in 1992).    Vaping Use    Vaping Use: Never used   Substance and Sexual Activity    Alcohol use: Not Currently     Alcohol/week: 3.0 standard drinks     Types: 3 Standard drinks or equivalent per week     Comment: 2-3 times a week glass of wine, once in a while a mixed drink    Drug use: Never    Sexual activity: Not on file   Other Topics Concern    Not on file   Social History Narrative    Not on file     Social Determinants of Health     Financial Resource Strain: Not on file   Food Insecurity: Not on file   Transportation Needs: Not on file   Physical Activity: Sufficiently Active    Days of Exercise per Week: 3 days    Minutes of Exercise per Session: 60 min   Stress: Not on file   Social Connections: Not on file   Intimate Partner Violence: Not on file   Housing Stability: Not on file       Family History   Problem Relation Age of Onset    Glaucoma Mother     Other Mother         TIA x3, appendix removed, hysterectomy    Memory Loss Mother     Valvular Heart Disease Mother         Valve \"leaks a little bit\"    Dementia Father     Prostate Cancer Brother     Other Maternal Grandfather         Smoker    Cancer Maternal Grandfather     Heart Attack Maternal Uncle 77        COD       Allergies:  Ciprofloxacin    Home Medications:  Prior to Admission medications    Medication Sig Start Date End Date Taking? Authorizing Provider   tamsulosin (FLOMAX) 0.4 MG capsule Take 1 capsule by mouth daily for 5 doses 3/16/23 3/21/23 Yes Yulia Shook, DO   cephALEXin (KEFLEX) 500 MG capsule Take 1 capsule by mouth 4 times daily for 10 days 3/16/23 3/26/23 Yes Yulia Shook, DO   Incontinence Supply Disposable (DISPOSABLE BRIEF LARGE) MISC Apply 1 large brief every 4 hours (6 briefs per day) for mixed urinary incontinence following radical prostatectomy. 2/16/23   Alexx Foley APRN - CNP   cephALEXin (KEFLEX) 500 MG capsule Take 1 capsule by mouth 3 times daily To start day before cath removal 1/31/23   Michela Conley MD   acetaminophen (TYLENOL) 500 MG tablet Take 1,000 mg by mouth every 6 hours as needed for Pain    Historical Provider, MD   tamsulosin (FLOMAX) 0.4 MG capsule Take 1 capsule by mouth in the morning and at bedtime  Patient not taking: Reported on 2/7/2023 8/30/22   Charlotte Sousa MD   irbesartan (AVAPRO) 150 MG tablet Take 1 tablet by mouth daily 8/22/22   Torey Keita MD   dutasteride (AVODART) 0.5 MG capsule take 1 capsule by mouth once daily  Patient not taking: Reported on 2/7/2023 7/17/22   Charlotte Sousa MD   simvastatin (ZOCOR) 20 MG tablet Take 1 tablet by mouth nightly 5/10/22   Torey Keita MD   CLARITIN-D 24 HOUR  MG per extended release tablet Take 1 tablet by mouth daily 4/14/22   Torey Keita MD   Cholecalciferol 2000 units TABS Take 2,000 Units by mouth daily D3    Historical Provider, MD   Glucosamine HCl--250 MG TABS Take 2 tablets by mouth daily Move Free    Historical Provider, MD   pyridostigmine (MESTINON) 60 MG tablet Take 60 mg by mouth 4 times daily     Historical Provider, MD       REVIEW OF SYSTEMS       Review of Systems   Constitutional:  Negative for fever. Gastrointestinal:  Positive for abdominal pain. Negative for constipation, diarrhea, nausea and vomiting. Genitourinary:  Positive for dysuria.  Negative for flank pain, frequency, penile discharge, penile pain, scrotal swelling and testicular pain. PHYSICAL EXAM      INITIAL VITALS:   /75   Pulse 59   Temp 98.7 °F (37.1 °C) (Oral)   Resp 17   Ht 5' 10\" (1.778 m)   Wt 165 lb (74.8 kg)   SpO2 99%   BMI 23.68 kg/m²     Physical Exam  Constitutional:       General: He is not in acute distress. HENT:      Head: Normocephalic and atraumatic. Right Ear: External ear normal.      Left Ear: External ear normal.      Nose: Nose normal.   Eyes:      Conjunctiva/sclera: Conjunctivae normal.   Cardiovascular:      Rate and Rhythm: Normal rate. Pulses: Normal pulses. Pulmonary:      Effort: Pulmonary effort is normal. No respiratory distress. Abdominal:      General: Abdomen is flat. There is no distension. Palpations: Abdomen is soft. Tenderness: There is no abdominal tenderness. There is no guarding or rebound. Musculoskeletal:         General: No swelling. Cervical back: No tenderness. Skin:     General: Skin is warm. Capillary Refill: Capillary refill takes less than 2 seconds. Neurological:      Mental Status: He is alert and oriented to person, place, and time. Psychiatric:         Mood and Affect: Mood normal.         DDX/DIAGNOSTIC RESULTS / EMERGENCY DEPARTMENT COURSE / MDM     Medical Decision Making  This is a 26-year-old gentleman with history of prostatectomy presenting with left lower quadrant pain. Differential including postoperative complication, kidney stones, urinary tract infection, constipation, diverticulitis. Will obtain blood work, urinalysis, CT abdomen pelvis. Amount and/or Complexity of Data Reviewed  Labs: ordered. Decision-making details documented in ED Course. Radiology: ordered. Decision-making details documented in ED Course. Risk  Prescription drug management.           EMERGENCY DEPARTMENT COURSE:    ED Course as of 03/17/23 0106   u Mar 16, 2023   2114 WBC(!): 22.5  Concerning for infectious process. [ML]   2236 Small left renal stone. Possible cause of left sided pain. [ML]   2315 Nitrite, Urine: NEGATIVE [ML]   2315 Leukocyte Esterase, Urine(!): SMALL [ML]   Fri Mar 17, 2023   0003 Spoke with urology will get dose of rocephin here, keflex for home, and follow up outpatient. [ML]      ED Course User Index  [ML] Hank Rodgers DO         CONSULTS:  IP CONSULT TO UROLOGY      FINAL IMPRESSION      1. Kidney stone          DISPOSITION / PLAN     DISPOSITION Decision To Discharge 03/17/2023 12:02:26 AM      PATIENT REFERRED TO:  MD Khalif Gaviria [address] [place] [address]  [phone]    Schedule an appointment as soon as possible for a visit       DISCHARGE MEDICATIONS:  Discharge Medication List as of 3/16/2023 11:51 PM        START taking these medications    Details   !! tamsulosin (FLOMAX) 0.4 MG capsule Take 1 capsule by mouth daily for 5 doses, Disp-5 capsule, R-0Normal      !! cephALEXin (KEFLEX) 500 MG capsule Take 1 capsule by mouth 4 times daily for 10 days, Disp-40 capsule, R-0Normal       !! - Potential duplicate medications found. Please discuss with provider.           Isha Baeza DO  Emergency Medicine Resident    (Please note that portions of thisnote were completed with a voice recognition program.  Efforts were made to edit the dictations but occasionally words are mis-transcribed.)       Hank Rodgers DO  Resident  03/17/23 0033

## 2023-03-19 LAB
MICROORGANISM SPEC CULT: ABNORMAL
SPECIMEN DESCRIPTION: ABNORMAL

## 2023-03-22 NOTE — TELEPHONE ENCOUNTER
I've had issues in the past with certain insurances coverage on incontinence supplies. What I would recommend that he does is reach out to insurance to check to see if it is even a covered product, and also see if they have a specific company they prefer rx to be sent to.

## 2023-03-23 NOTE — TELEPHONE ENCOUNTER
Can you please call patient and ask who he is seeing moving forward. He followed up once after his surgery and was told to repeat PSA 6 weeks post-op, then we move to an every 3 month PSA level for 1 year, then every 6 mos for several years. I just want to be sure he is established with someone and getting PSA regularly with the hx prostate ca and recent prostatectomy. If he prefers to see another urologist, that is perfectly fine- it will be his responsibility to follow-up with Dr. Lex Cooks if he decides to switch back or if needed. Thanks!

## 2023-03-23 NOTE — TELEPHONE ENCOUNTER
Patient stated  \"I am very disappointed in . I was never told how the surgery went. I was never told what to expect. Yes! I am following up with a urologist at [facility]. \"

## 2023-07-31 ENCOUNTER — HOSPITAL ENCOUNTER (OUTPATIENT)
Age: 73
Discharge: HOME | End: 2023-07-31
Payer: MEDICARE

## 2023-07-31 LAB — PSA SERPL-MCNC: <0.02 NG/ML

## 2023-07-31 PROCEDURE — G0103 PSA SCREENING: HCPCS

## 2023-07-31 PROCEDURE — 36415 COLL VENOUS BLD VENIPUNCTURE: CPT

## 2023-08-24 ENCOUNTER — HOSPITAL ENCOUNTER (OUTPATIENT)
Age: 73
Discharge: HOME | End: 2023-08-24
Payer: MEDICARE

## 2023-08-24 DIAGNOSIS — I10 ESSENTIAL HYPERTENSION: ICD-10-CM

## 2023-08-24 DIAGNOSIS — C61 PROSTATE CANCER (HCC): ICD-10-CM

## 2023-08-24 LAB
ALBUMIN SERPL-MCNC: 4.2 G/DL (ref 3.5–5.2)
ALP SERPL-CCNC: 58 U/L (ref 40–129)
ALT SERPL-CCNC: 12 U/L (ref 5–41)
ANION GAP SERPL CALCULATED.3IONS-SCNC: 9 MMOL/L (ref 9–17)
AST SERPL-CCNC: 14 U/L
BACTERIA URNS QL MICRO: ABNORMAL
BASOPHILS # BLD: 0 K/UL (ref 0–0.2)
BASOPHILS NFR BLD: 0 % (ref 0–2)
BILIRUB SERPL-MCNC: 0.6 MG/DL (ref 0.3–1.2)
BILIRUB UR QL STRIP: NEGATIVE
BUN SERPL-MCNC: 18 MG/DL (ref 8–23)
CALCIUM SERPL-MCNC: 8.8 MG/DL (ref 8.6–10.4)
CASTS #/AREA URNS LPF: ABNORMAL /LPF
CHLORIDE SERPL-SCNC: 107 MMOL/L (ref 98–107)
CHOLEST SERPL-MCNC: 126 MG/DL
CHOLESTEROL/HDL RATIO: 2.5
CLARITY UR: ABNORMAL
CO2 SERPL-SCNC: 25 MMOL/L (ref 20–31)
COLOR UR: YELLOW
CREAT SERPL-MCNC: 0.6 MG/DL (ref 0.7–1.2)
EOSINOPHIL # BLD: 0 K/UL (ref 0–0.4)
EOSINOPHILS RELATIVE PERCENT: 0 % (ref 0–4)
EPI CELLS #/AREA URNS HPF: ABNORMAL /HPF
ERYTHROCYTE [DISTWIDTH] IN BLOOD BY AUTOMATED COUNT: 14.3 % (ref 11.5–14.9)
GFR SERPL CREATININE-BSD FRML MDRD: >60 ML/MIN/1.73M2
GLUCOSE SERPL-MCNC: 87 MG/DL (ref 70–99)
GLUCOSE UR STRIP-MCNC: NEGATIVE MG/DL
HCT VFR BLD AUTO: 38.7 % (ref 41–53)
HDLC SERPL-MCNC: 51 MG/DL
HGB BLD-MCNC: 12.9 G/DL (ref 13.5–17.5)
HGB UR QL STRIP.AUTO: ABNORMAL
KETONES UR STRIP-MCNC: NEGATIVE MG/DL
LDLC SERPL CALC-MCNC: 63 MG/DL (ref 0–130)
LEUKOCYTE ESTERASE UR QL STRIP: ABNORMAL
LYMPHOCYTES NFR BLD: 16.57 K/UL (ref 1–4.8)
LYMPHOCYTES RELATIVE PERCENT: 76 % (ref 24–44)
MCH RBC QN AUTO: 32.6 PG (ref 26–34)
MCHC RBC AUTO-ENTMCNC: 33.3 G/DL (ref 31–37)
MCV RBC AUTO: 97.8 FL (ref 80–100)
MONOCYTES NFR BLD: 0.87 K/UL (ref 0.1–1.3)
MONOCYTES NFR BLD: 4 % (ref 1–7)
MORPHOLOGY: ABNORMAL
NEUTROPHILS NFR BLD: 20 % (ref 36–66)
NEUTS SEG NFR BLD: 4.36 K/UL (ref 1.3–9.1)
NITRITE UR QL STRIP: NEGATIVE
PH UR STRIP: 5.5 [PH] (ref 5–8)
PLATELET # BLD AUTO: 147 K/UL (ref 150–450)
PMV BLD AUTO: 8.8 FL (ref 6–12)
POTASSIUM SERPL-SCNC: 4.2 MMOL/L (ref 3.7–5.3)
PROT SERPL-MCNC: 6 G/DL (ref 6.4–8.3)
PROT UR STRIP-MCNC: ABNORMAL MG/DL
RBC # BLD AUTO: 3.95 M/UL (ref 4.5–5.9)
RBC #/AREA URNS HPF: ABNORMAL /HPF
RETICS # AUTO: 0.04 M/UL (ref 0.02–0.1)
RETICS/RBC NFR AUTO: 0.9 % (ref 0.5–2)
SODIUM SERPL-SCNC: 141 MMOL/L (ref 135–144)
SP GR UR STRIP: 1.02 (ref 1–1.03)
TRIGL SERPL-MCNC: 61 MG/DL
UROBILINOGEN UR STRIP-ACNC: NORMAL EU/DL (ref 0–1)
WBC #/AREA URNS HPF: ABNORMAL /HPF
WBC OTHER # BLD: 21.8 K/UL (ref 3.5–11)

## 2023-08-24 PROCEDURE — 87077 CULTURE AEROBIC IDENTIFY: CPT

## 2023-08-24 PROCEDURE — 85025 COMPLETE CBC W/AUTO DIFF WBC: CPT

## 2023-08-24 PROCEDURE — 85045 AUTOMATED RETICULOCYTE COUNT: CPT

## 2023-08-24 PROCEDURE — 81001 URINALYSIS AUTO W/SCOPE: CPT

## 2023-08-24 PROCEDURE — 87186 SC STD MICRODIL/AGAR DIL: CPT

## 2023-08-24 PROCEDURE — 80053 COMPREHEN METABOLIC PANEL: CPT

## 2023-08-24 PROCEDURE — 80061 LIPID PANEL: CPT

## 2023-08-24 PROCEDURE — 36415 COLL VENOUS BLD VENIPUNCTURE: CPT

## 2023-08-24 PROCEDURE — 87086 URINE CULTURE/COLONY COUNT: CPT

## 2023-08-25 LAB
PATH REV BLD -IMP: NORMAL
SURGICAL PATHOLOGY REPORT: NORMAL

## 2023-08-26 LAB
MICROORGANISM SPEC CULT: ABNORMAL
SPECIMEN DESCRIPTION: ABNORMAL

## 2023-08-28 PROBLEM — D68.9 COAGULATION DEFECT (HCC): Status: ACTIVE | Noted: 2023-08-28

## 2023-10-24 ENCOUNTER — TELEPHONE (OUTPATIENT)
Dept: PODIATRY | Age: 73
End: 2023-10-24

## 2023-10-24 NOTE — TELEPHONE ENCOUNTER
Potential patient was unwilling to verify information and was very rude and disconnected before I could speak with office to assist with scheduling him as a new patient. Nettie Jacobo can be reached at [phone].

## 2023-10-28 ENCOUNTER — HOSPITAL ENCOUNTER (EMERGENCY)
Age: 73
Discharge: HOME | End: 2023-10-28
Attending: EMERGENCY MEDICINE
Payer: MEDICARE

## 2023-10-28 VITALS
DIASTOLIC BLOOD PRESSURE: 86 MMHG | RESPIRATION RATE: 18 BRPM | HEART RATE: 61 BPM | SYSTOLIC BLOOD PRESSURE: 133 MMHG | OXYGEN SATURATION: 100 % | TEMPERATURE: 97.8 F | HEIGHT: 70 IN | BODY MASS INDEX: 22.9 KG/M2 | WEIGHT: 160 LBS

## 2023-10-28 DIAGNOSIS — R31.0 GROSS HEMATURIA: Primary | ICD-10-CM

## 2023-10-28 LAB
BACTERIA URNS QL MICRO: ABNORMAL
BILIRUB UR QL STRIP: NEGATIVE
CASTS #/AREA URNS LPF: ABNORMAL /LPF
CLARITY UR: CLEAR
COLOR UR: ABNORMAL
EPI CELLS #/AREA URNS HPF: ABNORMAL /HPF
GLUCOSE UR STRIP-MCNC: NEGATIVE MG/DL
HGB UR QL STRIP.AUTO: ABNORMAL
KETONES UR STRIP-MCNC: NEGATIVE MG/DL
LEUKOCYTE ESTERASE UR QL STRIP: ABNORMAL
NITRITE UR QL STRIP: NEGATIVE
PH UR STRIP: 7.5 [PH] (ref 5–8)
PROT UR STRIP-MCNC: ABNORMAL MG/DL
RBC #/AREA URNS HPF: ABNORMAL /HPF
SP GR UR STRIP: 1 (ref 1–1.03)
UROBILINOGEN UR STRIP-ACNC: NORMAL EU/DL (ref 0–1)
WBC #/AREA URNS HPF: ABNORMAL /HPF

## 2023-10-28 PROCEDURE — 87086 URINE CULTURE/COLONY COUNT: CPT

## 2023-10-28 PROCEDURE — 81001 URINALYSIS AUTO W/SCOPE: CPT

## 2023-10-28 PROCEDURE — 99283 EMERGENCY DEPT VISIT LOW MDM: CPT

## 2023-10-28 ASSESSMENT — ENCOUNTER SYMPTOMS
BACK PAIN: 0
DIARRHEA: 0
NAUSEA: 0
COUGH: 0
SORE THROAT: 0
VOMITING: 0
ABDOMINAL PAIN: 0
CONSTIPATION: 0
CHEST TIGHTNESS: 0
SHORTNESS OF BREATH: 0
EYE PAIN: 0

## 2023-10-28 ASSESSMENT — LIFESTYLE VARIABLES
HOW MANY STANDARD DRINKS CONTAINING ALCOHOL DO YOU HAVE ON A TYPICAL DAY: 1 OR 2
HOW OFTEN DO YOU HAVE A DRINK CONTAINING ALCOHOL: MONTHLY OR LESS

## 2023-10-28 ASSESSMENT — PAIN - FUNCTIONAL ASSESSMENT: PAIN_FUNCTIONAL_ASSESSMENT: NONE - DENIES PAIN

## 2023-10-28 NOTE — ED TRIAGE NOTES
Mode of arrival (squad #, walk in, police, etc) : Walk in        Chief complaint(s): Hematuria, dysuria        Arrival Note (brief scenario, treatment PTA, etc). : Pt arrives to ED c/o concern for a UTI. Patient reports hematuria that started around 1700 last night. Patient also reports some dysuria as well.  Patient does have a history of kidney stones

## 2023-10-30 ENCOUNTER — CARE COORDINATION (OUTPATIENT)
Dept: CARE COORDINATION | Age: 73
End: 2023-10-30

## 2023-10-30 NOTE — CARE COORDINATION
Ambulatory Care Coordination  ED Follow up Call    Reason for ED visit:  Camilla Red   Status:     improved    Did you call your PCP prior to going to the ED? No      Did you receive a discharge instructions from the Emergency Room? Yes  Review of Instructions:     Understands what to report/when to return?:  Yes   Understands discharge instructions?:  Yes   Following discharge instructions?:  Yes   If not why? Are there any new complaints of pain? No  New Pain Meds? No    Constipation prophylaxis needed? N/A    If you have a wound is the dressing clean, dry, and intact? N/A  Understands wound care regimen? N/A    Are there any other complaints/concerns that you wish to tell your provider? no    FU appts/Provider:    Future Appointments   Date Time Provider [address]   12/4/2023 10:15 AM Mk Hawkins DPM [id number]   2/29/2024 10:00 AM Selene Melara MD [address]           New Medications?:   No      Medication Reconciliation by phone - No  Understands Medications? Yes  Taking Medications? Yes  Can you swallow your pills? Yes    Any further needs in the home i.e. Equipment? No    Link to services in community?:  No   Which services:      Spoke with Fr. Nahomy Cook. He states he is doing better. He passed a 5-6mm stone on Sunday morning. He said he still has some blood in his urine. He spoke with his urology office today and they told him he could have blood in his urine up to a week. He has a pending urine culture. He has been in contact with his PCP today and she is expecting results tomorrow and will call him. He denies any other needs.

## 2023-11-01 LAB
MICROORGANISM SPEC CULT: NORMAL
SPECIMEN DESCRIPTION: NORMAL

## 2023-12-04 ENCOUNTER — OFFICE VISIT (OUTPATIENT)
Dept: PODIATRY | Age: 73
End: 2023-12-04
Payer: MEDICARE

## 2023-12-04 VITALS — BODY MASS INDEX: 22.9 KG/M2 | WEIGHT: 160 LBS | HEIGHT: 70 IN

## 2023-12-04 DIAGNOSIS — M79.671 PAIN IN RIGHT FOOT: ICD-10-CM

## 2023-12-04 DIAGNOSIS — L98.9 BENIGN SKIN LESION: Primary | ICD-10-CM

## 2023-12-04 DIAGNOSIS — M79.672 PAIN IN LEFT FOOT: ICD-10-CM

## 2023-12-04 PROCEDURE — 3017F COLORECTAL CA SCREEN DOC REV: CPT | Performed by: PODIATRIST

## 2023-12-04 PROCEDURE — 1036F TOBACCO NON-USER: CPT | Performed by: PODIATRIST

## 2023-12-04 PROCEDURE — G8427 DOCREV CUR MEDS BY ELIG CLIN: HCPCS | Performed by: PODIATRIST

## 2023-12-04 PROCEDURE — 99203 OFFICE O/P NEW LOW 30 MIN: CPT | Performed by: PODIATRIST

## 2023-12-04 PROCEDURE — 1123F ACP DISCUSS/DSCN MKR DOCD: CPT | Performed by: PODIATRIST

## 2023-12-04 PROCEDURE — G8484 FLU IMMUNIZE NO ADMIN: HCPCS | Performed by: PODIATRIST

## 2023-12-04 PROCEDURE — G8420 CALC BMI NORM PARAMETERS: HCPCS | Performed by: PODIATRIST

## 2023-12-04 PROCEDURE — 17110 DESTRUCTION B9 LES UP TO 14: CPT | Performed by: PODIATRIST

## 2023-12-04 ASSESSMENT — ENCOUNTER SYMPTOMS
COLOR CHANGE: 0
SHORTNESS OF BREATH: 0
DIARRHEA: 0
BACK PAIN: 0
NAUSEA: 0

## 2023-12-04 NOTE — PROGRESS NOTES
Toenails 1-5 of the left foot do present with thickness, discoloration, brittleness, subungual debris. Interdigital maceration absent to web spaces 1-4, Bilateral.     The skin to the bilateral feet is not thin and shiny. The skin to the bilateral feet is  warm, supple, and dry. Vascular: DP pulse of the right foot is  palpable. DP pulse of the left foot is  palpable. PT pulse of the right foot is  palpable. PT pulse of the left foot is  palpable. CFT is less than 3 secs to the digits of the right foot. CFT is less than 3 secs to the digits of the left foot. There is no edema noted to the bilateral foot or ankle. There is hair growth noted to the digits of the bilateral feet. There are no varicosities noted to the right foot/ankle. There are no varicosities noted to the left foot/ankle. Erythema is absent to the bilateral feet. Neurological: Reflexes are present to the right plantar foot and to the Achilles tendon. Reflexes are present to the left plantar foot and to the Achilles tendon. Epicritic sensation is  intact to the right foot. Epicritic sensation is  intact to the left foot. Musculoskeletal:  Muscle strength is +5/5 to all four muscle groups of the right lower extremity and +5/5 to all four muscle groups of the left lower extremity. There are no areas of subluxation, dislocation, or laxity noted to either lower extremity. Range of motion to the right ankle is  free of pain or grinding. Range of motion to the left ankle is  free of pain or grinding. Range of motion to the right subtalar joint is  free of pain or grinding. Range of motion to the left subtalar joint is  free of pain or grinding. No abnormalities, asymmetries, or misalignments are seen between the extremities.     Weightbearing evaluation does not reveal rearfoot eversion, medial prominence of the talar head, loss of the medial longitudinal arch

## 2024-01-18 ENCOUNTER — OFFICE VISIT (OUTPATIENT)
Dept: PODIATRY | Age: 74
End: 2024-01-18
Payer: MEDICARE

## 2024-01-18 VITALS — BODY MASS INDEX: 22.9 KG/M2 | WEIGHT: 160 LBS | HEIGHT: 70 IN

## 2024-01-18 DIAGNOSIS — M79.672 PAIN IN LEFT FOOT: ICD-10-CM

## 2024-01-18 DIAGNOSIS — L98.9 BENIGN SKIN LESION: Primary | ICD-10-CM

## 2024-01-18 DIAGNOSIS — M79.671 PAIN IN RIGHT FOOT: ICD-10-CM

## 2024-01-18 PROCEDURE — 99999 PR OFFICE/OUTPT VISIT,PROCEDURE ONLY: CPT | Performed by: PODIATRIST

## 2024-01-18 PROCEDURE — 17110 DESTRUCTION B9 LES UP TO 14: CPT | Performed by: PODIATRIST

## 2024-01-22 ASSESSMENT — ENCOUNTER SYMPTOMS
COLOR CHANGE: 0
SHORTNESS OF BREATH: 0
BACK PAIN: 0

## 2024-01-22 NOTE — PROGRESS NOTES
DESTRUCTION BENIGN LESIONS UP TO 14      2. Pain in left foot  AK DESTRUCTION BENIGN LESIONS UP TO 14      3. Pain in right foot  AK DESTRUCTION BENIGN LESIONS UP TO 14            Plan: 1. Clinical evaluation of the patient. 2.  Following debridement of benign skin lesions to the bilateral foot the areas were treated with Phenol and covered with Band-Aids.  3. Return in about 2 weeks (around 2/1/2024) for Benign skin lesion.   1/18/2024      Rashad Plasencia DPM

## 2024-01-30 ENCOUNTER — OFFICE VISIT (OUTPATIENT)
Dept: PODIATRY | Age: 74
End: 2024-01-30
Payer: MEDICARE

## 2024-01-30 VITALS — WEIGHT: 160 LBS | HEIGHT: 70 IN | BODY MASS INDEX: 22.9 KG/M2

## 2024-01-30 DIAGNOSIS — L60.0 ONYCHOCRYPTOSIS: ICD-10-CM

## 2024-01-30 DIAGNOSIS — M79.671 PAIN IN RIGHT FOOT: ICD-10-CM

## 2024-01-30 DIAGNOSIS — L03.031 PARONYCHIA OF GREAT TOE OF RIGHT FOOT: Primary | ICD-10-CM

## 2024-01-30 PROCEDURE — G8427 DOCREV CUR MEDS BY ELIG CLIN: HCPCS | Performed by: PODIATRIST

## 2024-01-30 PROCEDURE — 3017F COLORECTAL CA SCREEN DOC REV: CPT | Performed by: PODIATRIST

## 2024-01-30 PROCEDURE — 1036F TOBACCO NON-USER: CPT | Performed by: PODIATRIST

## 2024-01-30 PROCEDURE — 1123F ACP DISCUSS/DSCN MKR DOCD: CPT | Performed by: PODIATRIST

## 2024-01-30 PROCEDURE — G8484 FLU IMMUNIZE NO ADMIN: HCPCS | Performed by: PODIATRIST

## 2024-01-30 PROCEDURE — G8420 CALC BMI NORM PARAMETERS: HCPCS | Performed by: PODIATRIST

## 2024-01-30 PROCEDURE — 99213 OFFICE O/P EST LOW 20 MIN: CPT | Performed by: PODIATRIST

## 2024-01-30 ASSESSMENT — ENCOUNTER SYMPTOMS
SHORTNESS OF BREATH: 0
COLOR CHANGE: 0
NAUSEA: 0
BACK PAIN: 0
DIARRHEA: 0

## 2024-01-30 NOTE — PROGRESS NOTES
[facility]  Return Patient Progress Note    Subjective: Chase SOTO Beti 73 y.o. male that presents for follow up evaluation of removal of ingrown nail right great toe.  Chief Complaint   Patient presents with    Nail Problem     Left hallux     Patient's treatment thus far has included daily dressing changes with antibiotic ointment and Band-Aid.  Pain is rated 1 out of 10 and is described as intermittent. Patient has been following my prescribed course of therapy as instructed.     Review of Systems   Constitutional:  Negative for activity change, appetite change, chills, diaphoresis, fatigue and fever.   Respiratory:  Negative for shortness of breath.    Cardiovascular:  Negative for leg swelling.   Gastrointestinal:  Negative for diarrhea and nausea.   Endocrine: Negative for cold intolerance, heat intolerance and polyuria.   Musculoskeletal:  Positive for arthralgias. Negative for back pain, gait problem, joint swelling and myalgias.   Skin:  Negative for color change, pallor, rash and wound.   Allergic/Immunologic: Negative for environmental allergies and food allergies.   Neurological:  Negative for dizziness, weakness, light-headedness and numbness.   Hematological:  Does not bruise/bleed easily.   Psychiatric/Behavioral:  Negative for behavioral problems, confusion and self-injury. The patient is not nervous/anxious.        Objective: Clinical evaluation of the patient reveals absence to the Medial border of the right hallux nail plate. There is mild erythema and edema remaining to the right hallux. There is eschar noted to the Medial border of the right hallux, but this is not lytic. The nail bed to this area is granular. There is no drainage or malodor noted to the right hallux. There is mild pain with palpation and manipulation of the right hallux.    Assessment:    Diagnosis Orders   1. Paronychia of great toe of right foot        2. Onychocryptosis        3. Pain in right foot

## 2024-02-01 ENCOUNTER — HOSPITAL ENCOUNTER (OUTPATIENT)
Age: 74
Discharge: HOME | End: 2024-02-01
Payer: MEDICARE

## 2024-02-01 PROCEDURE — 84153 ASSAY OF PSA TOTAL: CPT

## 2024-02-01 PROCEDURE — 36415 COLL VENOUS BLD VENIPUNCTURE: CPT

## 2024-02-03 LAB — PSA SERPL DL<=0.01 NG/ML-MCNC: <0.01 NG/ML (ref 0–4)

## 2024-02-03 NOTE — RESULT ENCOUNTER NOTE
Father Chase - your PSA is undetectable, which is expected after the surgery you had. See you later this month!

## 2024-02-29 PROBLEM — Z85.46 HISTORY OF PROSTATE CANCER: Status: ACTIVE | Noted: 2024-02-29

## 2024-03-20 ENCOUNTER — TELEPHONE (OUTPATIENT)
Dept: GASTROENTEROLOGY | Age: 74
End: 2024-03-20

## 2024-03-20 RX ORDER — SODIUM, POTASSIUM,MAG SULFATES 17.5-3.13G
1 SOLUTION, RECONSTITUTED, ORAL ORAL ONCE
Qty: 1 EACH | Refills: 0 | Status: SHIPPED | OUTPATIENT
Start: 2024-03-20 | End: 2024-03-20

## 2024-03-20 NOTE — TELEPHONE ENCOUNTER
Procedure scheduled/[facility]  Colonoscopy/screening (wq)  3/26/24  12:30pm/arrive 11:00am      [facility] entrance  SuPrep bowel prep instructions sent to patient MyChart  Patient advised by phone/MyChart

## 2024-03-25 NOTE — H&P
HEENT:   negative for tinnitus, epistaxis and sore throat     RESPIRATORY:   negative for cough, shortness of breath, wheezing     CARDIOVASCULAR:   negative for chest pain, palpitations, syncope, edema     GASTROINTESTINAL:   negative for nausea, vomiting     GENITOURINARY:   negative for incontinence     MUSCULOSKELETAL:   negative for neck or back pain     NEUROLOGICAL:   Negative for weakness and tingling  negative for headaches and dizziness     PSYCHIATRIC:   negative for anxiety       OBJECTIVE:   VITALS:  height is 1.778 m (5' 10\") and weight is 72.6 kg (160 lb). His temperature is 97.9 °F (36.6 °C). His blood pressure is 144/84 (abnormal) and his pulse is 53. His respiration is 18 and oxygen saturation is 94%.   CONSTITUTIONAL:alert & oriented x 3, no acute distress. Calm and pleasant.    SKIN:  Warm and dry, no rashes to exposed areas of skin.   HEAD:  Normocephalic, atraumatic.   EYES: PERRL.  EOMs intact.   EARS:  Intact and equal bilaterally.  No edema or thickening, without lumps, lesions, or discharge. Hearing grossly WNL.    NOSE:  Nares patent.  No rhinorrhea.   MOUTH/THROAT:  Mucous membranes pink and moist, uvula midline, teeth appear to be intact.   NECK: Supple, no lymphadenopathy.  LUNGS: Respirations even and non-labored. Clear to auscultation bilaterally, no wheezes, rales, or rhonchi.    CARDIOVASCULAR: Regular rate and rhythm, no murmurs/rubs/gallops.   ABDOMEN: soft, non-tender and non-distended, bowel sounds active x 4.   EXTREMITIES: No edema to bilateral lower extremities. No varicosities to bilateral lower extremities.   NEUROLOGIC: CN II-XII are grossly intact. Gait not assessed.  IMPRESSIONS:   Colon cancer screening.  PLANS:   COLORECTAL CANCER SCREENING, NOT HIGH RISK.    ALONDRA Pierce CNP   Electronically signed 3/26/2024 at 11:08 AM

## 2024-03-26 ENCOUNTER — ANESTHESIA EVENT (OUTPATIENT)
Dept: OPERATING ROOM | Age: 74
End: 2024-03-26
Payer: MEDICARE

## 2024-03-26 ENCOUNTER — ANESTHESIA (OUTPATIENT)
Dept: OPERATING ROOM | Age: 74
End: 2024-03-26
Payer: MEDICARE

## 2024-03-26 ENCOUNTER — HOSPITAL ENCOUNTER (OUTPATIENT)
Age: 74
Setting detail: OUTPATIENT SURGERY
Discharge: HOME | End: 2024-03-26
Attending: INTERNAL MEDICINE | Admitting: INTERNAL MEDICINE
Payer: MEDICARE

## 2024-03-26 VITALS
SYSTOLIC BLOOD PRESSURE: 148 MMHG | WEIGHT: 160 LBS | RESPIRATION RATE: 16 BRPM | HEART RATE: 52 BPM | TEMPERATURE: 97.5 F | BODY MASS INDEX: 22.9 KG/M2 | DIASTOLIC BLOOD PRESSURE: 86 MMHG | HEIGHT: 70 IN | OXYGEN SATURATION: 95 %

## 2024-03-26 PROCEDURE — 6360000002 HC RX W HCPCS: Performed by: NURSE ANESTHETIST, CERTIFIED REGISTERED

## 2024-03-26 PROCEDURE — 2580000003 HC RX 258: Performed by: NURSE ANESTHETIST, CERTIFIED REGISTERED

## 2024-03-26 PROCEDURE — 3700000000 HC ANESTHESIA ATTENDED CARE: Performed by: INTERNAL MEDICINE

## 2024-03-26 PROCEDURE — 3609027000 HC COLONOSCOPY: Performed by: INTERNAL MEDICINE

## 2024-03-26 PROCEDURE — 3700000001 HC ADD 15 MINUTES (ANESTHESIA): Performed by: INTERNAL MEDICINE

## 2024-03-26 PROCEDURE — G0105 COLORECTAL SCRN; HI RISK IND: HCPCS | Performed by: INTERNAL MEDICINE

## 2024-03-26 PROCEDURE — 7100000010 HC PHASE II RECOVERY - FIRST 15 MIN: Performed by: INTERNAL MEDICINE

## 2024-03-26 PROCEDURE — 6360000002 HC RX W HCPCS: Performed by: ANESTHESIOLOGY

## 2024-03-26 PROCEDURE — 7100000011 HC PHASE II RECOVERY - ADDTL 15 MIN: Performed by: INTERNAL MEDICINE

## 2024-03-26 RX ORDER — ONDANSETRON 2 MG/ML
4 INJECTION INTRAMUSCULAR; INTRAVENOUS
Status: CANCELLED | OUTPATIENT
Start: 2024-03-26 | End: 2024-03-27

## 2024-03-26 RX ORDER — FENTANYL CITRATE 50 UG/ML
50 INJECTION, SOLUTION INTRAMUSCULAR; INTRAVENOUS EVERY 5 MIN PRN
Status: CANCELLED | OUTPATIENT
Start: 2024-03-26

## 2024-03-26 RX ORDER — SODIUM CHLORIDE 0.9 % (FLUSH) 0.9 %
5-40 SYRINGE (ML) INJECTION EVERY 12 HOURS SCHEDULED
Status: CANCELLED | OUTPATIENT
Start: 2024-03-26

## 2024-03-26 RX ORDER — PROPOFOL 10 MG/ML
INJECTION, EMULSION INTRAVENOUS CONTINUOUS PRN
Status: DISCONTINUED | OUTPATIENT
Start: 2024-03-26 | End: 2024-03-26 | Stop reason: SDUPTHER

## 2024-03-26 RX ORDER — FENTANYL CITRATE 50 UG/ML
50 INJECTION, SOLUTION INTRAMUSCULAR; INTRAVENOUS EVERY 5 MIN PRN
Status: DISCONTINUED | OUTPATIENT
Start: 2024-03-26 | End: 2024-03-26 | Stop reason: HOSPADM

## 2024-03-26 RX ORDER — NALOXONE HYDROCHLORIDE 0.4 MG/ML
INJECTION, SOLUTION INTRAMUSCULAR; INTRAVENOUS; SUBCUTANEOUS PRN
Status: CANCELLED | OUTPATIENT
Start: 2024-03-26

## 2024-03-26 RX ORDER — FENTANYL CITRATE 50 UG/ML
25 INJECTION, SOLUTION INTRAMUSCULAR; INTRAVENOUS EVERY 5 MIN PRN
Status: CANCELLED | OUTPATIENT
Start: 2024-03-26

## 2024-03-26 RX ORDER — SODIUM CHLORIDE, SODIUM LACTATE, POTASSIUM CHLORIDE, CALCIUM CHLORIDE 600; 310; 30; 20 MG/100ML; MG/100ML; MG/100ML; MG/100ML
INJECTION, SOLUTION INTRAVENOUS CONTINUOUS PRN
Status: DISCONTINUED | OUTPATIENT
Start: 2024-03-26 | End: 2024-03-26 | Stop reason: SDUPTHER

## 2024-03-26 RX ORDER — SODIUM CHLORIDE 0.9 % (FLUSH) 0.9 %
5-40 SYRINGE (ML) INJECTION PRN
Status: CANCELLED | OUTPATIENT
Start: 2024-03-26

## 2024-03-26 RX ORDER — SODIUM CHLORIDE 9 MG/ML
INJECTION, SOLUTION INTRAVENOUS PRN
Status: CANCELLED | OUTPATIENT
Start: 2024-03-26

## 2024-03-26 RX ADMIN — PROPOFOL 125 MCG/KG/MIN: 10 INJECTION, EMULSION INTRAVENOUS at 11:18

## 2024-03-26 RX ADMIN — FENTANYL CITRATE 50 MCG: 50 INJECTION INTRAMUSCULAR; INTRAVENOUS at 12:23

## 2024-03-26 RX ADMIN — SODIUM CHLORIDE, POTASSIUM CHLORIDE, SODIUM LACTATE AND CALCIUM CHLORIDE: 600; 310; 30; 20 INJECTION, SOLUTION INTRAVENOUS at 11:47

## 2024-03-26 RX ADMIN — SODIUM CHLORIDE, POTASSIUM CHLORIDE, SODIUM LACTATE AND CALCIUM CHLORIDE: 600; 310; 30; 20 INJECTION, SOLUTION INTRAVENOUS at 11:13

## 2024-03-26 ASSESSMENT — PAIN - FUNCTIONAL ASSESSMENT: PAIN_FUNCTIONAL_ASSESSMENT: 0-10

## 2024-03-26 ASSESSMENT — PAIN DESCRIPTION - DESCRIPTORS
DESCRIPTORS: SHARP
DESCRIPTORS: ACHING
DESCRIPTORS: ACHING
DESCRIPTORS: ACHING;SHARP

## 2024-03-26 ASSESSMENT — PAIN SCALES - GENERAL
PAINLEVEL_OUTOF10: 2
PAINLEVEL_OUTOF10: 8
PAINLEVEL_OUTOF10: 7
PAINLEVEL_OUTOF10: 2

## 2024-03-26 ASSESSMENT — PAIN DESCRIPTION - LOCATION
LOCATION: ABDOMEN;RECTUM
LOCATION: ABDOMEN
LOCATION: ABDOMEN

## 2024-03-26 NOTE — DISCHARGE INSTRUCTIONS
[facility]    POST-ENDOSCOPY INSTRUCTIONS    1. ACTIVITY   No driving, operating machinery, or making important decisions for 24 hours.    Resume normal activity after 24 hours.  You may return to work after 24 hours.    2. DIET        Resume your usual diet unless specified below.   ***    3. MEDICATIONS    Resume your usual medications.     Do not consume alcohol, tranquilizers, or sleeping medications for 24 hour unless advised by your physician.                 4. PHYSICIAN FOLLOW-UP / INSTRUCTIONS    Please call the office/clinic in 10 days for biopsy results:      [  ] GI office:  [phone]          Follow up with your family physician as planned.    6. NORMAL CHANGES YOU MAY EXPERIENCE AFTER ENDOSCOPY:        COLONOSCOPY   Passing of gas for several hours.   Some mild abdominal cramping.                  You may feel fatigued for the next 24-48   hours due to the prep and sedation    7. CALL YOUR PHYSICIAN IF YOU EXPERIENCE ANY OF THE FOLLOWING      A.  Passing blood rectally or vomiting blood (color may be red or black)      B.  Severe abdominal pain or tenderness (that is not relieved by passing air)      C.  Fever, chills, or excessive sweating      D.  Persistent nausea or vomiting      E.  Redness or swelling at the IV site    If you have additional questions, PLEASE call your doctor or the [facility] GI Unit ([phone])

## 2024-03-26 NOTE — ANESTHESIA PRE PROCEDURE
in college, smoked x1 cigar in 1992 (quit smoking in 1992).   Substance Use Topics    Alcohol use: Yes     Alcohol/week: 4.0 standard drinks of alcohol     Types: 1 Glasses of wine, 3 Standard drinks or equivalent per week     Comment: 3 glasses wine weekly                                Counseling given: Not Answered  Tobacco comments: Smoked a pipe about 1 year in his 20's, also smoked cigarettes in college, smoked x1 cigar in 1992 (quit smoking in 1992).      Vital Signs (Current):   Vitals:    03/25/24 1142   Weight: 72.6 kg (160 lb)   Height: 1.778 m (5' 10\")                                              BP Readings from Last 3 Encounters:   02/29/24 (!) 150/60   10/28/23 133/86   08/28/23 118/65       NPO Status:                                                                                 BMI:   Wt Readings from Last 3 Encounters:   03/25/24 72.6 kg (160 lb)   02/29/24 76.8 kg (169 lb 6 oz)   01/30/24 72.6 kg (160 lb)     Body mass index is 22.96 kg/m².    CBC:   Lab Results   Component Value Date/Time    WBC 21.8 08/24/2023 08:48 AM    RBC 3.95 08/24/2023 08:48 AM    HGB 12.9 08/24/2023 08:48 AM    HCT 38.7 08/24/2023 08:48 AM    MCV 97.8 08/24/2023 08:48 AM    RDW 14.3 08/24/2023 08:48 AM     08/24/2023 08:48 AM       CMP:   Lab Results   Component Value Date/Time     08/24/2023 08:48 AM    K 4.2 08/24/2023 08:48 AM     08/24/2023 08:48 AM    CO2 25 08/24/2023 08:48 AM    BUN 18 08/24/2023 08:48 AM    CREATININE 0.6 08/24/2023 08:48 AM    GFRAA >60 10/25/2019 03:35 PM    LABGLOM >60 08/24/2023 08:48 AM    GLUCOSE 87 08/24/2023 08:48 AM    PROT 6.0 08/24/2023 08:48 AM    CALCIUM 8.8 08/24/2023 08:48 AM    BILITOT 0.6 08/24/2023 08:48 AM    ALKPHOS 58 08/24/2023 08:48 AM    AST 14 08/24/2023 08:48 AM    ALT 12 08/24/2023 08:48 AM       POC Tests: No results for input(s): \"POCGLU\", \"POCNA\", \"POCK\", \"POCCL\", \"POCBUN\", \"POCHEMO\", \"POCHCT\" in the last 72 hours.    Coags: No results found for:

## 2024-03-26 NOTE — OP NOTE
Operative Note      Patient: Chase Bang  YOB: 1950  MRN: [id number]    Date of Procedure: 3/26/2024    Pre-Op Diagnosis Codes:     * Colon cancer screening [Z12.11]        History of polyps    Post-Op Diagnosis:  Pandiverticulosis and internal hemorrhoid       Procedure(s):  COLONOSCOPY DIAGNOSTIC    Surgeon(s):  Tyrell England MD    Assistant:   * No surgical staff found *    Anesthesia: * No anesthesia type entered *    Estimated Blood Loss (mL): Minimal    Complications: None    Specimens:   * No specimens in log *    Implants:  * No implants in log *      Drains: * No LDAs found *           Scope withdrawal time: 12-minute    Description of Procedure:  Informed consent was obtained from the patient after explanation of the procedure including indications, description of the procedure,  benefits and possible risks and complications of the procedure, and alternatives. Questions were answered.  The patient's history was reviewed and a directed physical examination was performed prior to the procedure.    Patient was monitored throughout the procedure with pulse oximetry and periodic assessment of vital signs. Patient was sedated as noted above. With the patient initially in the left lateral decubitus position, a digital rectal examination was performed and revealed negative without mass, lesions or tenderness.  The Olympus video colonoscope was placed in the patient's rectum and advanced without difficulty  to the cecum, which was identified by the ileocecal valve and appendiceal orifice.  The prep was good.  Examination of the mucosa was performed during both introduction and withdrawal of the colonoscope. Retroflexed view of the rectum was performed.  The patient  was taken to the recovery area in good condition.     Findings:     1.  Pan diverticulosis.  2.  Internal hemorrhoids seen during retroflexion view     Recommendations: Repeat colonoscopy in 5 years.

## 2024-03-27 ENCOUNTER — TELEPHONE (OUTPATIENT)
Dept: GASTROENTEROLOGY | Age: 74
End: 2024-03-27

## 2024-03-27 NOTE — ANESTHESIA POSTPROCEDURE EVALUATION
Department of Anesthesiology  Postprocedure Note    Patient: Chase Bang  MRN: [id number]  YOB: 1950  Date of evaluation: 3/27/2024    Procedure Summary       Date: 03/26/24 Room / Location: [address]    Anesthesia Start: 1113 Anesthesia Stop: 1152    Procedure: COLONOSCOPY DIAGNOSTIC Diagnosis:       Colon cancer screening      (Colon cancer screening [Z12.11])    Surgeons: Tyrell England MD Responsible Provider: Jefferson Castellon MD    Anesthesia Type: MAC ASA Status: 3            Anesthesia Type: No value filed.    Na Phase I:      Na Phase II: Na Score: 10  POST-OP ANESTHESIA NOTE       BP (!) 148/86   Pulse 52   Temp 97.5 °F (36.4 °C) (Temporal)   Resp 16   Ht 1.778 m (5' 10\")   Wt 72.6 kg (160 lb)   SpO2 95%   PF (!) 19 L/min   BMI 22.96 kg/m²    Pain Assessment: 0-10  Pain Level: 2       Anesthesia Post Evaluation    Patient location during evaluation: PACU  Patient participation: complete - patient participated  Level of consciousness: awake  Pain score: 2  Airway patency: patent  Nausea & Vomiting: no vomiting and no nausea  Cardiovascular status: hemodynamically stable  Respiratory status: acceptable  Hydration status: stable  Pain management: adequate        No notable events documented.

## 2024-03-27 NOTE — TELEPHONE ENCOUNTER
----- Message from Tyrell England MD sent at 3/26/2024 11:51 AM EDT -----  Repeat colonoscopy in 5 years

## 2024-04-18 ENCOUNTER — HOSPITAL ENCOUNTER (OUTPATIENT)
Age: 74
Discharge: HOME | End: 2024-04-18
Payer: MEDICARE

## 2024-04-18 DIAGNOSIS — Z85.46 PERSONAL HISTORY OF PROSTATE CANCER: ICD-10-CM

## 2024-04-18 PROCEDURE — 36415 COLL VENOUS BLD VENIPUNCTURE: CPT

## 2024-04-18 PROCEDURE — 84153 ASSAY OF PSA TOTAL: CPT

## 2024-04-20 LAB — PSA SERPL DL<=0.01 NG/ML-MCNC: <0.01 NG/ML (ref 0–4)

## 2024-05-16 ENCOUNTER — HOSPITAL ENCOUNTER (EMERGENCY)
Age: 74
Discharge: LEFT BEFORE DISCHARGE | End: 2024-05-16

## 2024-05-16 VITALS
DIASTOLIC BLOOD PRESSURE: 84 MMHG | TEMPERATURE: 98.4 F | HEART RATE: 60 BPM | OXYGEN SATURATION: 98 % | SYSTOLIC BLOOD PRESSURE: 143 MMHG | RESPIRATION RATE: 18 BRPM

## 2024-05-16 ASSESSMENT — PAIN - FUNCTIONAL ASSESSMENT: PAIN_FUNCTIONAL_ASSESSMENT: 0-10

## 2024-05-16 ASSESSMENT — PAIN DESCRIPTION - LOCATION: LOCATION: BACK

## 2024-05-16 ASSESSMENT — LIFESTYLE VARIABLES: HOW OFTEN DO YOU HAVE A DRINK CONTAINING ALCOHOL: 2-3 TIMES A WEEK

## 2024-05-16 ASSESSMENT — PAIN SCALES - GENERAL: PAINLEVEL_OUTOF10: 10

## 2024-05-17 ENCOUNTER — APPOINTMENT (OUTPATIENT)
Dept: CT IMAGING | Age: 74
End: 2024-05-17
Payer: MEDICARE

## 2024-05-17 ENCOUNTER — HOSPITAL ENCOUNTER (EMERGENCY)
Age: 74
Discharge: HOME | End: 2024-05-17
Attending: EMERGENCY MEDICINE
Payer: MEDICARE

## 2024-05-17 VITALS
HEART RATE: 56 BPM | OXYGEN SATURATION: 99 % | DIASTOLIC BLOOD PRESSURE: 80 MMHG | RESPIRATION RATE: 18 BRPM | HEIGHT: 70 IN | SYSTOLIC BLOOD PRESSURE: 138 MMHG | BODY MASS INDEX: 22.9 KG/M2 | TEMPERATURE: 98.1 F | WEIGHT: 160 LBS

## 2024-05-17 DIAGNOSIS — N30.01 ACUTE CYSTITIS WITH HEMATURIA: Primary | ICD-10-CM

## 2024-05-17 DIAGNOSIS — M54.50 RIGHT-SIDED LOW BACK PAIN WITHOUT SCIATICA, UNSPECIFIED CHRONICITY: ICD-10-CM

## 2024-05-17 LAB
ALBUMIN SERPL-MCNC: 4.5 G/DL (ref 3.5–5.2)
ALP SERPL-CCNC: 47 U/L (ref 40–129)
ALT SERPL-CCNC: 13 U/L (ref 5–41)
ANION GAP SERPL CALCULATED.3IONS-SCNC: 9 MMOL/L (ref 9–17)
AST SERPL-CCNC: 17 U/L
BACTERIA URNS QL MICRO: ABNORMAL
BASOPHILS # BLD: 0 K/UL (ref 0–0.2)
BASOPHILS NFR BLD: 0 % (ref 0–2)
BILIRUB SERPL-MCNC: 0.9 MG/DL (ref 0.3–1.2)
BILIRUB UR QL STRIP: NEGATIVE
BUN SERPL-MCNC: 17 MG/DL (ref 8–23)
CALCIUM SERPL-MCNC: 8.8 MG/DL (ref 8.6–10.4)
CASTS #/AREA URNS LPF: ABNORMAL /LPF
CHLORIDE SERPL-SCNC: 109 MMOL/L (ref 98–107)
CLARITY UR: ABNORMAL
CO2 SERPL-SCNC: 26 MMOL/L (ref 20–31)
COLOR UR: ABNORMAL
CREAT SERPL-MCNC: 0.6 MG/DL (ref 0.7–1.2)
EOSINOPHIL # BLD: 0 K/UL (ref 0–0.4)
EOSINOPHILS RELATIVE PERCENT: 0 % (ref 0–4)
EPI CELLS #/AREA URNS HPF: ABNORMAL /HPF
ERYTHROCYTE [DISTWIDTH] IN BLOOD BY AUTOMATED COUNT: 14.3 % (ref 11.5–14.9)
GFR, ESTIMATED: >90 ML/MIN/1.73M2
GLUCOSE SERPL-MCNC: 108 MG/DL (ref 70–99)
GLUCOSE UR STRIP-MCNC: NEGATIVE MG/DL
HCT VFR BLD AUTO: 39.7 % (ref 41–53)
HGB BLD-MCNC: 12.7 G/DL (ref 13.5–17.5)
HGB UR QL STRIP.AUTO: ABNORMAL
KETONES UR STRIP-MCNC: ABNORMAL MG/DL
LEUKOCYTE ESTERASE UR QL STRIP: ABNORMAL
LIPASE SERPL-CCNC: 28 U/L (ref 13–60)
LYMPHOCYTES NFR BLD: 23.31 K/UL (ref 1–4.8)
LYMPHOCYTES RELATIVE PERCENT: 87 % (ref 24–44)
MAGNESIUM SERPL-MCNC: 2 MG/DL (ref 1.6–2.6)
MCH RBC QN AUTO: 31.4 PG (ref 26–34)
MCHC RBC AUTO-ENTMCNC: 32 G/DL (ref 31–37)
MCV RBC AUTO: 98.1 FL (ref 80–100)
MONOCYTES NFR BLD: 0.54 K/UL (ref 0.1–1.3)
MONOCYTES NFR BLD: 2 % (ref 1–7)
MORPHOLOGY: ABNORMAL
NEUTROPHILS NFR BLD: 11 % (ref 36–66)
NEUTS SEG NFR BLD: 2.95 K/UL (ref 1.3–9.1)
NITRITE UR QL STRIP: NEGATIVE
PH UR STRIP: 5.5 [PH] (ref 5–8)
PLATELET # BLD AUTO: 160 K/UL (ref 150–450)
PMV BLD AUTO: 8.9 FL (ref 6–12)
POTASSIUM SERPL-SCNC: 4.2 MMOL/L (ref 3.7–5.3)
PROT SERPL-MCNC: 6.3 G/DL (ref 6.4–8.3)
PROT UR STRIP-MCNC: ABNORMAL MG/DL
RBC # BLD AUTO: 4.04 M/UL (ref 4.5–5.9)
RBC #/AREA URNS HPF: ABNORMAL /HPF
SODIUM SERPL-SCNC: 144 MMOL/L (ref 135–144)
SP GR UR STRIP: 1.02 (ref 1–1.03)
UROBILINOGEN UR STRIP-ACNC: NORMAL EU/DL (ref 0–1)
WBC #/AREA URNS HPF: ABNORMAL /HPF
WBC OTHER # BLD: 26.8 K/UL (ref 3.5–11)

## 2024-05-17 PROCEDURE — 6370000000 HC RX 637 (ALT 250 FOR IP): Performed by: EMERGENCY MEDICINE

## 2024-05-17 PROCEDURE — 80053 COMPREHEN METABOLIC PANEL: CPT

## 2024-05-17 PROCEDURE — 81001 URINALYSIS AUTO W/SCOPE: CPT

## 2024-05-17 PROCEDURE — 99284 EMERGENCY DEPT VISIT MOD MDM: CPT

## 2024-05-17 PROCEDURE — 36415 COLL VENOUS BLD VENIPUNCTURE: CPT

## 2024-05-17 PROCEDURE — 85025 COMPLETE CBC W/AUTO DIFF WBC: CPT

## 2024-05-17 PROCEDURE — 72131 CT LUMBAR SPINE W/O DYE: CPT

## 2024-05-17 PROCEDURE — 74176 CT ABD & PELVIS W/O CONTRAST: CPT

## 2024-05-17 PROCEDURE — 83690 ASSAY OF LIPASE: CPT

## 2024-05-17 PROCEDURE — 2580000003 HC RX 258: Performed by: EMERGENCY MEDICINE

## 2024-05-17 PROCEDURE — 83735 ASSAY OF MAGNESIUM: CPT

## 2024-05-17 RX ORDER — ORPHENADRINE CITRATE 30 MG/ML
60 INJECTION INTRAMUSCULAR; INTRAVENOUS ONCE
Status: DISCONTINUED | OUTPATIENT
Start: 2024-05-17 | End: 2024-05-17 | Stop reason: HOSPADM

## 2024-05-17 RX ORDER — 0.9 % SODIUM CHLORIDE 0.9 %
1000 INTRAVENOUS SOLUTION INTRAVENOUS ONCE
Status: COMPLETED | OUTPATIENT
Start: 2024-05-17 | End: 2024-05-17

## 2024-05-17 RX ORDER — LIDOCAINE 4 G/G
1 PATCH TOPICAL ONCE
Status: DISCONTINUED | OUTPATIENT
Start: 2024-05-17 | End: 2024-05-17 | Stop reason: HOSPADM

## 2024-05-17 RX ORDER — CEFDINIR 300 MG/1
300 CAPSULE ORAL 2 TIMES DAILY
Qty: 20 CAPSULE | Refills: 0 | Status: SHIPPED | OUTPATIENT
Start: 2024-05-17 | End: 2024-05-27

## 2024-05-17 RX ORDER — LIDOCAINE 4 G/G
1 PATCH TOPICAL DAILY
Qty: 30 PATCH | Refills: 0 | Status: SHIPPED | OUTPATIENT
Start: 2024-05-17 | End: 2024-06-16

## 2024-05-17 RX ADMIN — SODIUM CHLORIDE 1000 ML: 9 INJECTION, SOLUTION INTRAVENOUS at 11:00

## 2024-05-17 ASSESSMENT — LIFESTYLE VARIABLES
HOW MANY STANDARD DRINKS CONTAINING ALCOHOL DO YOU HAVE ON A TYPICAL DAY: PATIENT DOES NOT DRINK
HOW OFTEN DO YOU HAVE A DRINK CONTAINING ALCOHOL: NEVER

## 2024-05-17 ASSESSMENT — PAIN SCALES - GENERAL: PAINLEVEL_OUTOF10: 0

## 2024-05-17 ASSESSMENT — ENCOUNTER SYMPTOMS
SHORTNESS OF BREATH: 0
BACK PAIN: 1
ABDOMINAL PAIN: 0
COLOR CHANGE: 0
EYE PAIN: 0

## 2024-05-17 ASSESSMENT — PAIN - FUNCTIONAL ASSESSMENT: PAIN_FUNCTIONAL_ASSESSMENT: 0-10

## 2024-05-17 NOTE — ED TRIAGE NOTES
Mode of arrival (squad #, walk in, police, etc) : Walk in        Chief complaint(s): Flank pain         Arrival Note (brief scenario, treatment PTA, etc).: Pt reports Rt side flank pain that started yesterday after his 2 mile walk. Pt reports no pain at rest and only hurts when he moves \"a certain way\". Pt denies blood in urine or dysuria. Pt is AO x 4, vitals assessed. Care ongoing.

## 2024-05-17 NOTE — DISCHARGE INSTR - COC
Continuity of Care Form    Patient Name: Chase Bang   :  1950  MRN:  [id number]    Admit date:  2024  Discharge date:  ***    Code Status Order: No Order   Advance Directives:     Admitting Physician:  No admitting provider for patient encounter.  PCP: Nisha Levin MD    Discharging Nurse: ***  Discharging Hospital Unit/Room#: 10/10  Discharging Unit Phone Number: ***    Emergency Contact:   Extended Emergency Contact Information  Primary Emergency Contact: Travis Bang  Home Phone: [phone]  Mobile Phone: [phone]  Relation: Brother/Sister  Secondary Emergency Contact: Viola Orosco  Home Phone: [phone]  Relation: Other    Past Surgical History:  Past Surgical History:   Procedure Laterality Date    COLONOSCOPY      2 times    COLONOSCOPY  2024    COLONOSCOPY  3/26/2024    COLONOSCOPY DIAGNOSTIC performed by Tyrell England MD at [facility] OR    CYSTOSCOPY  2022    EXTRACORPOREAL SHOCK WAVE LITHOTRIPSY  2016    Procedure: EXTRACORPOREAL SHOCK WAVE LITHOTRIPSY, right needs KUB; Surgeon: Geovanni Dominguez MD; Location: [facility]; Service: Urology Surgery; Laterality: Right; MR    EYE SURGERY      30 years ago laser to close holes in retina    KNEE ARTHROSCOPY Bilateral     meniscus    LITHOTRIPSY      3 times    PROSTATE BIOPSY  2022    PROSTATE BIOPSY N/A 2022    CYSTOSCOPY,  PROSTATE BIOPSY ULTRASOUND performed by Brian Dickey MD at [facility] OR    PROSTATECTOMY N/A 2023    PROSTATECTOMY LAPAROSCOPIC ROBOTIC XI   WITH  PELVIC LYMPH NODE DISSECTION performed by Sidney Sánchez MD at [facility] OR    TONSILLECTOMY AND ADENOIDECTOMY         Immunization History:   Immunization History   Administered Date(s) Administered    COVID-19, MODERNA Booster BLUE border, (age 18y+), IM, 50mcg/0.25mL 2022, 2022    COVID-19, US Vaccine, Vaccine Unspecified 2022, 2022    Influenza, FLUAD, (age 65 y+), Adjuvanted, 0.5mL 10/21/2021

## 2024-05-17 NOTE — ED PROVIDER NOTES
EMERGENCY DEPARTMENT ENCOUNTER    Pt Name: Chase Bang  MRN: [id number]  Birthdate 1950  Date of evaluation: 5/17/24  CHIEF COMPLAINT       Chief Complaint   Patient presents with    Flank Pain     HISTORY OF PRESENT ILLNESS   73-year-old male presents for complaints of right sided back pain he reports that the pain started yesterday.  He describes as a sharp pain, he denies anything making it significantly better or worse.  He denies any new numbness, tingling, weakness to the extremities denies any radiation of pain, he denies any difficulty urinating, pain with urination denies any bowel or bladder incontinence or saddle paresthesias.  He states that he has a history of both SI joint issues as well as history of kidney stones he states he is unsure what this 1 feels more like and wanted to get checked out.  He denies any associated fevers or chills chest pain or shortness of breath.    The history is provided by the patient.           REVIEW OF SYSTEMS     Review of Systems   Constitutional:  Negative for chills and fever.   HENT:  Negative for congestion and ear pain.    Eyes:  Negative for pain.   Respiratory:  Negative for shortness of breath.    Cardiovascular:  Negative for chest pain, palpitations and leg swelling.   Gastrointestinal:  Negative for abdominal pain.   Genitourinary:  Positive for flank pain. Negative for dysuria.   Musculoskeletal:  Positive for back pain.   Skin:  Negative for color change.   Neurological:  Negative for numbness and headaches.   Psychiatric/Behavioral:  Negative for confusion.    All other systems reviewed and are negative.    PASTMEDICAL HISTORY     Past Medical History:   Diagnosis Date    Arthritis     right shoulder/right sacroiliac    BPH (benign prostatic hyperplasia)     Caffeine use     2 cups coffee/day    Chronic back pain     Chronic lymphocytic leukemia (HCC)     numbers are stable. [facility] Dr. Faraz Santacruz. last appt 2020    COVID-19 10/22/2022    body aches,

## 2024-07-09 ENCOUNTER — HOSPITAL ENCOUNTER (OUTPATIENT)
Age: 74
Discharge: HOME | End: 2024-07-09
Payer: MEDICARE

## 2024-07-09 DIAGNOSIS — Z85.46 HISTORY OF PROSTATE CANCER: ICD-10-CM

## 2024-07-09 PROCEDURE — 84153 ASSAY OF PSA TOTAL: CPT

## 2024-07-09 PROCEDURE — 36415 COLL VENOUS BLD VENIPUNCTURE: CPT

## 2024-07-11 LAB — PSA SERPL DL<=0.01 NG/ML-MCNC: <0.01 NG/ML (ref 0–4)

## 2024-08-28 ENCOUNTER — HOSPITAL ENCOUNTER (OUTPATIENT)
Age: 74
Discharge: HOME | End: 2024-08-28
Payer: MEDICARE

## 2024-08-28 DIAGNOSIS — I10 ESSENTIAL HYPERTENSION: ICD-10-CM

## 2024-08-28 LAB
ABSOLUTE BANDS: 0.2 K/UL (ref 0–1)
ALBUMIN SERPL-MCNC: 4.2 G/DL (ref 3.5–5.2)
ALP SERPL-CCNC: 60 U/L (ref 40–129)
ALT SERPL-CCNC: 13 U/L (ref 5–41)
ANION GAP SERPL CALCULATED.3IONS-SCNC: 8 MMOL/L (ref 9–17)
AST SERPL-CCNC: 18 U/L
BANDS: 1 % (ref 0–10)
BASOPHILS # BLD: 0 K/UL (ref 0–0.2)
BASOPHILS NFR BLD: 0 % (ref 0–2)
BILIRUB SERPL-MCNC: 1.2 MG/DL (ref 0.3–1.2)
BUN SERPL-MCNC: 12 MG/DL (ref 8–23)
CALCIUM SERPL-MCNC: 8.5 MG/DL (ref 8.6–10.4)
CHLORIDE SERPL-SCNC: 105 MMOL/L (ref 98–107)
CHOLEST SERPL-MCNC: 141 MG/DL
CHOLESTEROL/HDL RATIO: 2.5
CO2 SERPL-SCNC: 26 MMOL/L (ref 20–31)
CREAT SERPL-MCNC: 0.6 MG/DL (ref 0.7–1.2)
EOSINOPHIL # BLD: 0 K/UL (ref 0–0.4)
EOSINOPHILS RELATIVE PERCENT: 0 % (ref 0–4)
ERYTHROCYTE [DISTWIDTH] IN BLOOD BY AUTOMATED COUNT: 14.9 % (ref 11.5–14.9)
GFR, ESTIMATED: >90 ML/MIN/1.73M2
GLUCOSE SERPL-MCNC: 107 MG/DL (ref 70–99)
HCT VFR BLD AUTO: 38.3 % (ref 41–53)
HDLC SERPL-MCNC: 57 MG/DL
HGB BLD-MCNC: 12.7 G/DL (ref 13.5–17.5)
LDLC SERPL CALC-MCNC: 71 MG/DL (ref 0–130)
LYMPHOCYTES NFR BLD: 14.68 K/UL (ref 1–4.8)
LYMPHOCYTES RELATIVE PERCENT: 73 % (ref 24–44)
MCH RBC QN AUTO: 31.6 PG (ref 26–34)
MCHC RBC AUTO-ENTMCNC: 33.1 G/DL (ref 31–37)
MCV RBC AUTO: 95.5 FL (ref 80–100)
MONOCYTES NFR BLD: 0.8 K/UL (ref 0.1–1.3)
MONOCYTES NFR BLD: 4 % (ref 1–7)
MORPHOLOGY: ABNORMAL
NEUTROPHILS NFR BLD: 22 % (ref 36–66)
NEUTS SEG NFR BLD: 4.42 K/UL (ref 1.3–9.1)
PLATELET # BLD AUTO: 111 K/UL (ref 150–450)
PMV BLD AUTO: 8.5 FL (ref 6–12)
POTASSIUM SERPL-SCNC: 4.3 MMOL/L (ref 3.7–5.3)
PROT SERPL-MCNC: 6.1 G/DL (ref 6.4–8.3)
RBC # BLD AUTO: 4.01 M/UL (ref 4.5–5.9)
SODIUM SERPL-SCNC: 139 MMOL/L (ref 135–144)
TRIGL SERPL-MCNC: 63 MG/DL
WBC OTHER # BLD: 20.1 K/UL (ref 3.5–11)

## 2024-08-28 PROCEDURE — 85025 COMPLETE CBC W/AUTO DIFF WBC: CPT

## 2024-08-28 PROCEDURE — 36415 COLL VENOUS BLD VENIPUNCTURE: CPT

## 2024-08-28 PROCEDURE — 80061 LIPID PANEL: CPT

## 2024-08-28 PROCEDURE — 80053 COMPREHEN METABOLIC PANEL: CPT

## 2024-08-28 NOTE — RESULT ENCOUNTER NOTE
Father Chase -    I sent that Zpack in for you.     Your labs are looking good overall. The white blood cell count is lower than the last check, and lower than last year. Cholesterol is good.     See you tomorrow!

## 2024-09-23 ENCOUNTER — OFFICE VISIT (OUTPATIENT)
Dept: PODIATRY | Age: 74
End: 2024-09-23
Payer: MEDICARE

## 2024-09-23 VITALS — WEIGHT: 179 LBS | HEIGHT: 70 IN | BODY MASS INDEX: 25.62 KG/M2

## 2024-09-23 DIAGNOSIS — M79.675 PAIN IN TOE OF LEFT FOOT: ICD-10-CM

## 2024-09-23 DIAGNOSIS — L60.0 ONYCHOCRYPTOSIS: Primary | ICD-10-CM

## 2024-09-23 PROCEDURE — 99213 OFFICE O/P EST LOW 20 MIN: CPT | Performed by: PODIATRIST

## 2024-09-23 PROCEDURE — 1123F ACP DISCUSS/DSCN MKR DOCD: CPT | Performed by: PODIATRIST

## 2024-09-23 PROCEDURE — G8419 CALC BMI OUT NRM PARAM NOF/U: HCPCS | Performed by: PODIATRIST

## 2024-09-23 PROCEDURE — 3017F COLORECTAL CA SCREEN DOC REV: CPT | Performed by: PODIATRIST

## 2024-09-23 PROCEDURE — G8427 DOCREV CUR MEDS BY ELIG CLIN: HCPCS | Performed by: PODIATRIST

## 2024-09-23 PROCEDURE — 1036F TOBACCO NON-USER: CPT | Performed by: PODIATRIST

## 2024-09-23 ASSESSMENT — ENCOUNTER SYMPTOMS
SHORTNESS OF BREATH: 0
DIARRHEA: 0
BACK PAIN: 0
COLOR CHANGE: 0
NAUSEA: 0

## 2024-10-01 ENCOUNTER — HOSPITAL ENCOUNTER (OUTPATIENT)
Age: 74
Setting detail: SPECIMEN
Discharge: HOME | End: 2024-10-01
Payer: MEDICARE

## 2024-10-01 DIAGNOSIS — Z85.46 HISTORY OF PROSTATE CANCER: ICD-10-CM

## 2024-10-01 LAB
BACTERIA URNS QL MICRO: ABNORMAL
BILIRUB UR QL STRIP: NEGATIVE
CASTS #/AREA URNS LPF: ABNORMAL /LPF
CLARITY UR: ABNORMAL
COLOR UR: ABNORMAL
EPI CELLS #/AREA URNS HPF: ABNORMAL /HPF
GLUCOSE UR STRIP-MCNC: NEGATIVE MG/DL
HGB UR QL STRIP.AUTO: ABNORMAL
KETONES UR STRIP-MCNC: ABNORMAL MG/DL
LEUKOCYTE ESTERASE UR QL STRIP: ABNORMAL
NITRITE UR QL STRIP: NEGATIVE
PH UR STRIP: 5.5 [PH] (ref 5–8)
PROT UR STRIP-MCNC: ABNORMAL MG/DL
RBC #/AREA URNS HPF: ABNORMAL /HPF
SP GR UR STRIP: 1.02 (ref 1–1.03)
UROBILINOGEN UR STRIP-ACNC: NORMAL EU/DL (ref 0–1)
WBC #/AREA URNS HPF: ABNORMAL /HPF

## 2024-10-01 PROCEDURE — 87086 URINE CULTURE/COLONY COUNT: CPT

## 2024-10-01 PROCEDURE — 87186 SC STD MICRODIL/AGAR DIL: CPT

## 2024-10-01 PROCEDURE — 81001 URINALYSIS AUTO W/SCOPE: CPT

## 2024-10-01 PROCEDURE — 87077 CULTURE AEROBIC IDENTIFY: CPT

## 2024-10-01 NOTE — RESULT ENCOUNTER NOTE
Father Chase - your are showing signs of a urinary tract infection. Are you having any urinary frequency or burning with urination?

## 2024-10-04 LAB
MICROORGANISM SPEC CULT: ABNORMAL
SPECIMEN DESCRIPTION: ABNORMAL

## 2024-10-18 ENCOUNTER — HOSPITAL ENCOUNTER (OUTPATIENT)
Age: 74
Discharge: HOME | End: 2024-10-18
Payer: MEDICARE

## 2024-10-18 DIAGNOSIS — N30.00 ACUTE CYSTITIS WITHOUT HEMATURIA: ICD-10-CM

## 2024-10-18 DIAGNOSIS — Z85.46 HISTORY OF PROSTATE CANCER: ICD-10-CM

## 2024-10-18 LAB
BACTERIA URNS QL MICRO: ABNORMAL
BILIRUB UR QL STRIP: NEGATIVE
CASTS #/AREA URNS LPF: ABNORMAL /LPF
CLARITY UR: CLEAR
COLOR UR: YELLOW
EPI CELLS #/AREA URNS HPF: ABNORMAL /HPF
GLUCOSE UR STRIP-MCNC: NEGATIVE MG/DL
HGB UR QL STRIP.AUTO: NEGATIVE
KETONES UR STRIP-MCNC: ABNORMAL MG/DL
LEUKOCYTE ESTERASE UR QL STRIP: ABNORMAL
NITRITE UR QL STRIP: POSITIVE
PH UR STRIP: 5.5 [PH] (ref 5–8)
PROT UR STRIP-MCNC: ABNORMAL MG/DL
RBC #/AREA URNS HPF: ABNORMAL /HPF
SP GR UR STRIP: 1.02 (ref 1–1.03)
UROBILINOGEN UR STRIP-ACNC: NORMAL EU/DL (ref 0–1)
WBC #/AREA URNS HPF: ABNORMAL /HPF

## 2024-10-18 PROCEDURE — 87086 URINE CULTURE/COLONY COUNT: CPT

## 2024-10-18 PROCEDURE — 84153 ASSAY OF PSA TOTAL: CPT

## 2024-10-18 PROCEDURE — 36415 COLL VENOUS BLD VENIPUNCTURE: CPT

## 2024-10-18 PROCEDURE — 81001 URINALYSIS AUTO W/SCOPE: CPT

## 2024-10-20 LAB
MICROORGANISM SPEC CULT: ABNORMAL
PSA SERPL DL<=0.01 NG/ML-MCNC: <0.01 NG/ML (ref 0–4)
SPECIMEN DESCRIPTION: ABNORMAL

## 2024-10-22 ENCOUNTER — HOSPITAL ENCOUNTER (INPATIENT)
Age: 74
LOS: 1 days | Discharge: HOME HEALTH | DRG: 690 | End: 2024-10-23
Attending: FAMILY MEDICINE | Admitting: FAMILY MEDICINE
Payer: MEDICARE

## 2024-10-22 PROBLEM — N39.0 PSEUDOMONAS URINARY TRACT INFECTION: Status: ACTIVE | Noted: 2024-10-22

## 2024-10-22 PROBLEM — N39.0 URINARY TRACT INFECTION DUE TO PSEUDOMONAS AERUGINOSA: Status: ACTIVE | Noted: 2024-10-22

## 2024-10-22 PROBLEM — B96.5 URINARY TRACT INFECTION DUE TO PSEUDOMONAS AERUGINOSA: Status: ACTIVE | Noted: 2024-10-22

## 2024-10-22 PROBLEM — B96.5 PSEUDOMONAS URINARY TRACT INFECTION: Status: ACTIVE | Noted: 2024-10-22

## 2024-10-22 LAB
ANION GAP SERPL CALCULATED.3IONS-SCNC: 25 MMOL/L (ref 9–16)
BASOPHILS # BLD: 0 K/UL (ref 0–0.2)
BASOPHILS NFR BLD: 0 % (ref 0–2)
BUN SERPL-MCNC: 11 MG/DL (ref 8–23)
CALCIUM SERPL-MCNC: 8.3 MG/DL (ref 8.6–10.4)
CHLORIDE SERPL-SCNC: 105 MMOL/L (ref 98–107)
CO2 SERPL-SCNC: 11 MMOL/L (ref 20–31)
CREAT SERPL-MCNC: 0.7 MG/DL (ref 0.7–1.2)
EOSINOPHIL # BLD: 0.25 K/UL (ref 0–0.4)
EOSINOPHILS RELATIVE PERCENT: 1 % (ref 0–4)
ERYTHROCYTE [DISTWIDTH] IN BLOOD BY AUTOMATED COUNT: 15.7 % (ref 11.5–14.9)
GFR, ESTIMATED: >90 ML/MIN/1.73M2
GLUCOSE SERPL-MCNC: 91 MG/DL (ref 74–99)
HCT VFR BLD AUTO: 40.5 % (ref 41–53)
HGB BLD-MCNC: 13.1 G/DL (ref 13.5–17.5)
LYMPHOCYTES NFR BLD: 19.84 K/UL (ref 1–4.8)
LYMPHOCYTES RELATIVE PERCENT: 81 % (ref 24–44)
MCH RBC QN AUTO: 31 PG (ref 26–34)
MCHC RBC AUTO-ENTMCNC: 32.5 G/DL (ref 31–37)
MCV RBC AUTO: 95.4 FL (ref 80–100)
MICROORGANISM SPEC CULT: ABNORMAL
MICROORGANISM SPEC CULT: ABNORMAL
MONOCYTES NFR BLD: 0.49 K/UL (ref 0.1–1.3)
MONOCYTES NFR BLD: 2 % (ref 1–7)
MORPHOLOGY: ABNORMAL
NEUTROPHILS NFR BLD: 16 % (ref 36–66)
NEUTS SEG NFR BLD: 3.92 K/UL (ref 1.3–9.1)
PLATELET # BLD AUTO: 174 K/UL (ref 150–450)
PMV BLD AUTO: 8.3 FL (ref 6–12)
POTASSIUM SERPL-SCNC: 4.5 MMOL/L (ref 3.7–5.3)
RBC # BLD AUTO: 4.24 M/UL (ref 4.5–5.9)
SODIUM SERPL-SCNC: 141 MMOL/L (ref 136–145)
SPECIMEN DESCRIPTION: ABNORMAL
WBC OTHER # BLD: 24.5 K/UL (ref 3.5–11)

## 2024-10-22 PROCEDURE — 6370000000 HC RX 637 (ALT 250 FOR IP): Performed by: FAMILY MEDICINE

## 2024-10-22 PROCEDURE — 85025 COMPLETE CBC W/AUTO DIFF WBC: CPT

## 2024-10-22 PROCEDURE — 80048 BASIC METABOLIC PNL TOTAL CA: CPT

## 2024-10-22 PROCEDURE — 99222 1ST HOSP IP/OBS MODERATE 55: CPT | Performed by: INTERNAL MEDICINE

## 2024-10-22 PROCEDURE — 1200000000 HC SEMI PRIVATE

## 2024-10-22 PROCEDURE — 05HA33Z INSERTION OF INFUSION DEVICE INTO LEFT BRACHIAL VEIN, PERCUTANEOUS APPROACH: ICD-10-PCS | Performed by: FAMILY MEDICINE

## 2024-10-22 PROCEDURE — 2580000003 HC RX 258: Performed by: INTERNAL MEDICINE

## 2024-10-22 PROCEDURE — 36415 COLL VENOUS BLD VENIPUNCTURE: CPT

## 2024-10-22 PROCEDURE — 76937 US GUIDE VASCULAR ACCESS: CPT

## 2024-10-22 PROCEDURE — 36410 VNPNXR 3YR/> PHY/QHP DX/THER: CPT

## 2024-10-22 PROCEDURE — 2709999900 HC NON-CHARGEABLE SUPPLY

## 2024-10-22 PROCEDURE — 6360000002 HC RX W HCPCS: Performed by: INTERNAL MEDICINE

## 2024-10-22 RX ORDER — SODIUM CHLORIDE 9 MG/ML
INJECTION, SOLUTION INTRAVENOUS PRN
Status: DISCONTINUED | OUTPATIENT
Start: 2024-10-22 | End: 2024-10-23 | Stop reason: HOSPADM

## 2024-10-22 RX ORDER — ACETAMINOPHEN 325 MG/1
650 TABLET ORAL EVERY 6 HOURS PRN
Status: DISCONTINUED | OUTPATIENT
Start: 2024-10-22 | End: 2024-10-22 | Stop reason: SDUPTHER

## 2024-10-22 RX ORDER — SODIUM CHLORIDE 0.9 % (FLUSH) 0.9 %
5-40 SYRINGE (ML) INJECTION EVERY 12 HOURS SCHEDULED
Status: DISCONTINUED | OUTPATIENT
Start: 2024-10-22 | End: 2024-10-23 | Stop reason: HOSPADM

## 2024-10-22 RX ORDER — LIDOCAINE HYDROCHLORIDE 10 MG/ML
50 INJECTION, SOLUTION EPIDURAL; INFILTRATION; INTRACAUDAL; PERINEURAL ONCE
Status: DISCONTINUED | OUTPATIENT
Start: 2024-10-22 | End: 2024-10-23 | Stop reason: HOSPADM

## 2024-10-22 RX ORDER — IBUPROFEN 800 MG/1
800 TABLET, FILM COATED ORAL 2 TIMES DAILY
Status: DISCONTINUED | OUTPATIENT
Start: 2024-10-22 | End: 2024-10-23 | Stop reason: HOSPADM

## 2024-10-22 RX ORDER — PYRIDOSTIGMINE BROMIDE 60 MG/1
60 TABLET ORAL 4 TIMES DAILY
Status: DISCONTINUED | OUTPATIENT
Start: 2024-10-22 | End: 2024-10-23 | Stop reason: HOSPADM

## 2024-10-22 RX ORDER — POLYETHYLENE GLYCOL 3350 17 G/17G
17 POWDER, FOR SOLUTION ORAL DAILY PRN
Status: DISCONTINUED | OUTPATIENT
Start: 2024-10-22 | End: 2024-10-23 | Stop reason: HOSPADM

## 2024-10-22 RX ORDER — ONDANSETRON 2 MG/ML
4 INJECTION INTRAMUSCULAR; INTRAVENOUS EVERY 6 HOURS PRN
Status: DISCONTINUED | OUTPATIENT
Start: 2024-10-22 | End: 2024-10-23 | Stop reason: HOSPADM

## 2024-10-22 RX ORDER — ACETAMINOPHEN 650 MG/1
650 SUPPOSITORY RECTAL EVERY 6 HOURS PRN
Status: DISCONTINUED | OUTPATIENT
Start: 2024-10-22 | End: 2024-10-23 | Stop reason: HOSPADM

## 2024-10-22 RX ORDER — ENOXAPARIN SODIUM 100 MG/ML
40 INJECTION SUBCUTANEOUS DAILY
Status: DISCONTINUED | OUTPATIENT
Start: 2024-10-22 | End: 2024-10-23 | Stop reason: HOSPADM

## 2024-10-22 RX ORDER — LOSARTAN POTASSIUM 25 MG/1
25 TABLET ORAL DAILY
Status: DISCONTINUED | OUTPATIENT
Start: 2024-10-22 | End: 2024-10-23 | Stop reason: HOSPADM

## 2024-10-22 RX ORDER — SODIUM CHLORIDE 0.9 % (FLUSH) 0.9 %
5-40 SYRINGE (ML) INJECTION PRN
Status: DISCONTINUED | OUTPATIENT
Start: 2024-10-22 | End: 2024-10-23 | Stop reason: HOSPADM

## 2024-10-22 RX ORDER — VITAMIN B COMPLEX
4000 TABLET ORAL DAILY
Status: DISCONTINUED | OUTPATIENT
Start: 2024-10-23 | End: 2024-10-23 | Stop reason: HOSPADM

## 2024-10-22 RX ORDER — MELOXICAM 15 MG/1
15 TABLET ORAL DAILY
Status: DISCONTINUED | OUTPATIENT
Start: 2024-10-22 | End: 2024-10-22

## 2024-10-22 RX ORDER — ONDANSETRON 4 MG/1
4 TABLET, ORALLY DISINTEGRATING ORAL EVERY 8 HOURS PRN
Status: DISCONTINUED | OUTPATIENT
Start: 2024-10-22 | End: 2024-10-23 | Stop reason: HOSPADM

## 2024-10-22 RX ORDER — ACETAMINOPHEN 500 MG
1000 TABLET ORAL EVERY 6 HOURS PRN
Status: DISCONTINUED | OUTPATIENT
Start: 2024-10-22 | End: 2024-10-23 | Stop reason: HOSPADM

## 2024-10-22 RX ORDER — ATORVASTATIN CALCIUM 10 MG/1
10 TABLET, FILM COATED ORAL DAILY
Status: DISCONTINUED | OUTPATIENT
Start: 2024-10-22 | End: 2024-10-23 | Stop reason: HOSPADM

## 2024-10-22 RX ADMIN — CEFEPIME 2000 MG: 2 INJECTION, POWDER, FOR SOLUTION INTRAVENOUS at 15:50

## 2024-10-22 RX ADMIN — PYRIDOSTIGMINE BROMIDE 60 MG: 60 TABLET ORAL at 20:19

## 2024-10-22 RX ADMIN — ACETAMINOPHEN 1000 MG: 500 TABLET ORAL at 20:19

## 2024-10-22 RX ADMIN — ATORVASTATIN CALCIUM 10 MG: 10 TABLET, FILM COATED ORAL at 20:19

## 2024-10-22 RX ADMIN — PYRIDOSTIGMINE BROMIDE 60 MG: 60 TABLET ORAL at 17:02

## 2024-10-22 RX ADMIN — IBUPROFEN 800 MG: 800 TABLET, FILM COATED ORAL at 20:19

## 2024-10-22 RX ADMIN — SODIUM CHLORIDE: 9 INJECTION, SOLUTION INTRAVENOUS at 15:49

## 2024-10-22 ASSESSMENT — PAIN SCALES - WONG BAKER: WONGBAKER_NUMERICALRESPONSE: NO HURT

## 2024-10-22 ASSESSMENT — PAIN DESCRIPTION - LOCATION: LOCATION: SHOULDER;BACK

## 2024-10-22 ASSESSMENT — PAIN DESCRIPTION - ORIENTATION: ORIENTATION: RIGHT;LEFT

## 2024-10-22 ASSESSMENT — PAIN DESCRIPTION - DESCRIPTORS: DESCRIPTORS: THROBBING

## 2024-10-22 ASSESSMENT — PAIN SCALES - GENERAL: PAINLEVEL_OUTOF10: 2

## 2024-10-22 NOTE — CONSULTS
[facility] -   Infectious diseases evaluation  admission date 10/22/2024    reason for consultation:   UTI    Impression :   Current:  Pseudomonas aeruginosa UTI   Chronic lymphocytic leukemia  Myasthenia gravis   History of prostate cancer status post prostatectomy  History of kidney stones  Hypertension  Hyperlipidemia      HENCE:   Cefepime 2 g every 12 hours for 1 week   Follow CBC and BUN/creatinine in 1 week  Midline   Follow up with urology   PVR  Avoid Levaquin due to history of myasthenia gravis    Infection Control Recommendations   [place] Precautions      Antimicrobial Stewardship Recommendations   Simplification of therapy  Targeted therapy      History of Present Illness:   Initial history:  Chase Bang is a 74 y.o.-year-old male was sent to the hospital by primary care physician for positive urine culture that grew Pseudomonas aeruginosa as outpatient.  The patient is complaining of generalized fatigue, energy level less than normal, was having burning with urination around 2 days prior to admission.  He denied fever or chills, no other urinary symptoms, no hematuria, no abdominal pain.  He does have chronic back pain due to arthritis.  Urinalysis on 10/18/2024 showed too numerous to count WBC, positive nitrate, moderate leukocyte esterase  The patient was treated with Keflex as outpatient.  WBC 24.5.  The patient had chronic leukocytosis due to chronic lymphocytic leukemia.  History of prostate cancer status post prostatectomy.  PSA was less than 0.01 on 10/18/2024  Interval changes  10/22/2024             I have personally reviewed the past medical history, past surgical history, medications, social history, and family history, and I haveupdated the database accordingly.      Allergies:   Ciprofloxacin     Review of Systems:     Review of Systems  As per history recent illness, other than above 14 system review was negative  Physical Examination :

## 2024-10-22 NOTE — PLAN OF CARE
Problem: Discharge Planning  Goal: Discharge to home or other facility with appropriate resources  Outcome: Progressing  Flowsheets (Taken 10/22/2024 4589)  Discharge to home or other facility with appropriate resources:   Identify barriers to discharge with patient and caregiver   Arrange for needed discharge resources and transportation as appropriate

## 2024-10-22 NOTE — CARE COORDINATION
DISCHARGE PLANNING NOTE:    First IMM given to the patient as he was a direct admission, copy placed in the chart.    IMM letter provided to patient.  Patient offered four hours to make informed decision regarding appeal process; patient agreeable to discharge.     Patient updated regarding plan for start of care tomorrow at 4:00 PM with [facility]. Patient agreeable.    Per the patient he will be going to [place] to visit with the sick over the weekend and wanted to make sure this would not effect his home care or antibiotic delivery.    This writer spoke to Yoana from [facility]. Per Yoana the medication is IV push and they will deliver the full seven days to the patient tomorrow. Yoana stated that he can absolutely travel with the medication, they just recommend putting this in a cooler on his drive.    Patient updated regarding this information.     Yoana stated she will update in the morning what the cost (if any) of the med is and that way the patient can be updated.    Electronically signed by Melisa Rocha RN on 10/22/2024 at 4:27 PM

## 2024-10-22 NOTE — ACP (ADVANCE CARE PLANNING)
Advance Care Planning     Advance Care Planning Activator (Inpatient)  Conversation Note      Date of ACP Conversation: 10/22/2024     Conversation Conducted with: Patient with Decision Making Capacity    ACP Activator: Melisa Rocha RN    Health Care Decision Maker:     Current Designated Health Care Decision Maker:     Primary Decision Maker: HIPABIGAIL,NO LONGER ON - Unknown - [phone]  Click here to complete Healthcare Decision Makers including section of the Healthcare Decision Maker Relationship (ie \"Primary\")  Today we documented Decision Maker(s) consistent with Legal Next of Kin hierarchy.    Care Preferences    Ventilation:  \"If you were in your present state of health and suddenly became very ill and were unable to breathe on your own, what would your preference be about the use of a ventilator (breathing machine) if it were available to you?\"      Would the patient desire the use of ventilator (breathing machine)?: yes    \"If your health worsens and it becomes clear that your chance of recovery is unlikely, what would your preference be about the use of a ventilator (breathing machine) if it were available to you?\"     Would the patient desire the use of ventilator (breathing machine)?: No      Resuscitation  \"CPR works best to restart the heart when there is a sudden event, like a heart attack, in someone who is otherwise healthy. Unfortunately, CPR does not typically restart the heart for people who have serious health conditions or who are very sick.\"    \"In the event your heart stopped as a result of an underlying serious health condition, would you want attempts to be made to restart your heart (answer \"yes\" for attempt to resuscitate) or would you prefer a natural death (answer \"no\" for do not attempt to resuscitate)?\" yes       [] Yes   [x] No   Educated Patient / Decision Maker regarding differences between Advance Directives and portable DNR orders.    Length of ACP Conversation in minutes:

## 2024-10-22 NOTE — CARE COORDINATION
DISCHARGE PLANNING NOTE:    Script for IV cefepime faxed to [facility]. Patient has been accepted by [facility] for start of care for tomorrow evening. Patient is currently on a 3:00 AM and 3:00 PM schedule, however has not received his IV antibiotic yet, therefore Andriy with [facility] updated that the patient will likely need start of care between 4:00-5:00 PM tomorrow, Wednesday, 10/23/24.     Patient will need to receive his dose here this evening and dose in the morning prior to discharge.    Awaiting return call from [facility] on cost and ability to deliver tomorrow, 10/23/24.    Electronically signed by Melisa Rocha RN on 10/22/2024 at 3:38 PM

## 2024-10-22 NOTE — CARE COORDINATION
Case Management Assessment  Initial Evaluation    Date/Time of Evaluation: 10/22/2024 3:29 PM  Assessment Completed by: Melisa Rocha RN    If patient is discharged prior to next notation, then this note serves as note for discharge by case management.    Patient Name: Chase Bang                   YOB: 1950  Diagnosis: Pseudomonas urinary tract infection [N39.0, B96.5]  Urinary tract infection due to Pseudomonas aeruginosa [N39.0, B96.5]                   Date / Time: 10/22/2024  1:31 PM    Patient Admission Status: Inpatient   Readmission Risk (Low < 19, Mod (19-27), High > 27): No data recorded  Current PCP: Nisha Levin MD  PCP verified by CM? Yes    Chart Reviewed: Yes      History Provided by: Patient  Patient Orientation: Alert and Oriented    Patient Cognition: Alert    Hospitalization in the last 30 days (Readmission):  No    If yes, Readmission Assessment in CM Navigator will be completed.    Advance Directives:      Code Status: Full Code   Patient's Primary Decision Maker is: Legal Next of Kin    Primary Decision Maker: HIPAA,NO LONGER ON - Unknown - [phone]    Discharge Planning:    Patient lives with: Alone Type of Home: House  Primary Care Giver: Self  Patient Support Systems include: Scientologist/Nancy Community   Current Financial resources: Medicare  Current community resources: None  Current services prior to admission: None            Current DME:              Type of Home Care services:  IV Therapy, Nursing Services    ADLS  Prior functional level: Independent in ADLs/IADLs  Current functional level: Independent in ADLs/IADLs    PT AM-PAC:   /24  OT AM-PAC:   /24    Family can provide assistance at DC: No  Would you like Case Management to discuss the discharge plan with any other family members/significant others, and if so, who? No  Plans to Return to Present Housing: Yes  Other Identified Issues/Barriers to RETURNING to current housing: IV antibiotics.  Potential

## 2024-10-22 NOTE — PROCEDURES
PROCEDURE NOTE  Date: 10/22/2024   Name: Chase Bang  YOB: 1950    Procedures    Midline insertion note: PICC order - Midline ordered in comments.     Prescribed therapy: Antibiotics       Product type: 4.5fr single lumen Antimicrobial/Antithrombogenic Arrow Midline  History/Labs/Allergies Reviewed  Placed By:   Rosa Isela Lucia RN IV Team  Assistant: Guilherme PRATT  Time out Performed using Two Identifiers    Insertion site:  Left  Basilic vein -   Total length: 15cm (blue flex tip intact).   External catheter length: 1cm  Extremity circumference at insertion site: 32 cm  Number of attempts: 1  Estimated blood loss: 1 ml  Placement verified by: positive blood return & flushes easily  Special equipment used: ultrasound & micro-introducer technique   Catheter secured with adhesive [employer] securement device  Catheter securement adhesive utilized at insertion site  Dressing applied: Tegaderm CHG  Lidocaine administered intradermally conc.1%: approx 1 mL    Midline education:     [ X ] Post care line insertion was discussed with patient/Family or POA prior to procedure.  Risks, benefits, alternatives, and reason for procedure were discussed and teaching was reinforced. An educational handout on post insertion line care and maintenance was left at bedside with patient or in chart. Patient (Family or POA) acknowledged understanding of information relayed.

## 2024-10-22 NOTE — PROGRESS NOTES
Spiritual Health History and Assessment/Progress Note  [facility]    (P) Spiritual/Emotional Needs,  ,  ,      Name: Chase Bang MRN: [id number]    Age: 74 y.o.     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 and his mother's death in January, 2024; listening presence, supportive conversation provided;          Spiritual Assessment began in [facility]        Referral/Consult From: (P) Nurse   Encounter Overview/Reason: (P) Spiritual/Emotional Needs  Service Provided For: (P) Patient    Nancy, Belief, Meaning:   Patient is connected with a nancy tradition or spiritual practice and has beliefs or practices that help with coping during difficult times  Family/Friends No family/friends present      Importance and Influence:  Patient has spiritual/personal beliefs that influence decisions regarding their health  Family/Friends No family/friends present    Community:  Patient feels well-supported. Support system includes: Nancy Community  Family/Friends No family/friends present    Assessment and Plan of Care:     Patient Interventions include: Facilitated expression of thoughts and feelings and Engaged in theological reflection  Family/Friends Interventions include: No family/friends present    Patient Plan of Care: Spiritual Care available upon further referral  Family/Friends Plan of Care: Spiritual Care available upon further referral    Electronically signed by Chaplain Willard on 10/22/2024 at 7:33 PM

## 2024-10-22 NOTE — DISCHARGE INSTR - COC
Continuity of Care Form    Patient Name: Chase Bang   :  1950  MRN:  [id number]    Admit date:  10/22/2024  Discharge date:  10/23/24    Code Status Order: Full Code   Advance Directives:   Advance Care Flowsheet Documentation             Admitting Physician:  Nisha Levin MD  PCP: Nisha Levin MD    Discharging Nurse:   Discharging Hospital Unit/Room#: 2073/2073-01  Discharging Unit Phone Number:     Emergency Contact:   Extended Emergency Contact Information  Primary Emergency Contact: Travis Bang  Home Phone: [phone]  Mobile Phone: [phone]  Relation: Brother/Sister  Secondary Emergency Contact: Viola Orosco  Home Phone: [phone]  Relation: Other    Past Surgical History:  Past Surgical History:   Procedure Laterality Date    COLONOSCOPY      2 times    COLONOSCOPY  2024    COLONOSCOPY  3/26/2024    COLONOSCOPY DIAGNOSTIC performed by Tyrell England MD at [facility] OR    CYSTOSCOPY  2022    EXTRACORPOREAL SHOCK WAVE LITHOTRIPSY  2016    Procedure: EXTRACORPOREAL SHOCK WAVE LITHOTRIPSY, right needs KUB; Surgeon: Geovanni Dominguez MD; Location: [facility]; Service: Urology Surgery; Laterality: Right; MR    EYE SURGERY      30 years ago laser to close holes in retina    KNEE ARTHROSCOPY Bilateral     meniscus    LITHOTRIPSY      3 times    PROSTATE BIOPSY  2022    PROSTATE BIOPSY N/A 2022    CYSTOSCOPY,  PROSTATE BIOPSY ULTRASOUND performed by Brian Dickey MD at [facility] OR    PROSTATECTOMY N/A 2023    PROSTATECTOMY LAPAROSCOPIC ROBOTIC XI   WITH  PELVIC LYMPH NODE DISSECTION performed by Sidney Sánchez MD at [facility] OR    TONSILLECTOMY AND ADENOIDECTOMY         Immunization History:   Immunization History   Administered Date(s) Administered    COVID-19, MODERNA Booster BLUE border, (age 18y+), IM, 50mcg/0.25mL 2022, 2022    COVID-19, US Vaccine, Vaccine Unspecified 2022, 2022    Influenza, FLUAD, (age 65

## 2024-10-23 VITALS
BODY MASS INDEX: 25.91 KG/M2 | HEART RATE: 64 BPM | TEMPERATURE: 97 F | SYSTOLIC BLOOD PRESSURE: 152 MMHG | RESPIRATION RATE: 15 BRPM | HEIGHT: 70 IN | WEIGHT: 181 LBS | OXYGEN SATURATION: 98 % | DIASTOLIC BLOOD PRESSURE: 90 MMHG

## 2024-10-23 LAB
ANION GAP SERPL CALCULATED.3IONS-SCNC: 9 MMOL/L (ref 9–16)
BUN SERPL-MCNC: 12 MG/DL (ref 8–23)
CALCIUM SERPL-MCNC: 8.8 MG/DL (ref 8.6–10.4)
CHLORIDE SERPL-SCNC: 106 MMOL/L (ref 98–107)
CO2 SERPL-SCNC: 26 MMOL/L (ref 20–31)
CREAT SERPL-MCNC: 0.6 MG/DL (ref 0.7–1.2)
GFR, ESTIMATED: >90 ML/MIN/1.73M2
GLUCOSE SERPL-MCNC: 93 MG/DL (ref 74–99)
POTASSIUM SERPL-SCNC: 4.8 MMOL/L (ref 3.7–5.3)
SODIUM SERPL-SCNC: 141 MMOL/L (ref 136–145)

## 2024-10-23 PROCEDURE — 6360000002 HC RX W HCPCS: Performed by: INTERNAL MEDICINE

## 2024-10-23 PROCEDURE — 36415 COLL VENOUS BLD VENIPUNCTURE: CPT

## 2024-10-23 PROCEDURE — 99223 1ST HOSP IP/OBS HIGH 75: CPT | Performed by: FAMILY MEDICINE

## 2024-10-23 PROCEDURE — 6370000000 HC RX 637 (ALT 250 FOR IP): Performed by: FAMILY MEDICINE

## 2024-10-23 PROCEDURE — 2580000003 HC RX 258: Performed by: INTERNAL MEDICINE

## 2024-10-23 PROCEDURE — 80048 BASIC METABOLIC PNL TOTAL CA: CPT

## 2024-10-23 PROCEDURE — 99233 SBSQ HOSP IP/OBS HIGH 50: CPT | Performed by: INTERNAL MEDICINE

## 2024-10-23 RX ORDER — IBUPROFEN 800 MG/1
800 TABLET, FILM COATED ORAL 2 TIMES DAILY
Qty: 60 TABLET | Refills: 1 | Status: SHIPPED | OUTPATIENT
Start: 2024-10-23

## 2024-10-23 RX ORDER — ONDANSETRON 4 MG/1
4 TABLET, ORALLY DISINTEGRATING ORAL EVERY 8 HOURS PRN
Qty: 30 TABLET | Refills: 0 | Status: SHIPPED | OUTPATIENT
Start: 2024-10-23

## 2024-10-23 RX ADMIN — IBUPROFEN 800 MG: 800 TABLET, FILM COATED ORAL at 08:19

## 2024-10-23 RX ADMIN — LOSARTAN POTASSIUM 25 MG: 25 TABLET, FILM COATED ORAL at 08:49

## 2024-10-23 RX ADMIN — Medication 4000 UNITS: at 08:19

## 2024-10-23 RX ADMIN — CEFEPIME 2000 MG: 2 INJECTION, POWDER, FOR SOLUTION INTRAVENOUS at 03:28

## 2024-10-23 RX ADMIN — PYRIDOSTIGMINE BROMIDE 60 MG: 60 TABLET ORAL at 08:19

## 2024-10-23 NOTE — CARE COORDINATION
Writer faxed [place]/DC med list to [facility]. Informed of DC & to call for Start of Care today between 4-5 for his IV antibiotics.

## 2024-10-23 NOTE — H&P
Family Medicine Admit Note    PCP: Nisha Levin MD    Date of Admission: 10/22/2024    Date of Service: Pt seen/examined on 10/23/24 and Admitted to Inpatient.    Chief Complaint:  Complicated UTI      History Of Present Illness:   The patient is a 74 y.o. male who presents to [facility] with reports of a resistant to treatment pseudomonas UTI. He was sent in by his PCP for failed outpatient treatment and wanting an ID consultation. He denies any fevers, chills, cough, congestion, chest pain, SOB, abdominal pain, N/V, dysuria, headaches or confusion.    Past Medical History:        Diagnosis Date    Arthritis     right shoulder/right sacroiliac    BPH (benign prostatic hyperplasia)     Caffeine use     2 cups coffee/day    Chronic back pain     Chronic lymphocytic leukemia (HCC)     numbers are stable. [facility] Dr. Faraz Santacruz. last appt 2020    COVID-19 10/22/2022    body aches, sinus congestion x 3 days, felt \"normal\" by day 5    Elevated PSA     Erectile dysfunction 1/31/2023    prostetectomy    History of myasthenia gravis     occular    Hyperlipidemia     Hypertension     Hypoglycemia     Kidney stones     Lightheadedness     Patient states dizziness/lightheadedness w/hypoglycemia    Ocular myasthenia gravis (HCC)     Prostate cancer (HCC) 12/2022    PVC's (premature ventricular contractions)     Sciatica     lower back    Seasonal allergies     Under care of team     Neurology Dr. Crandall/ [place], [place]/ last seen 9-7-23    Vitamin D deficiency     Wears glasses     Wellness examination     PCP Nisha Levin MD/ [place]/ last seen 2-29-24       Past Surgical History:        Procedure Laterality Date    COLONOSCOPY      2 times    COLONOSCOPY  03/26/2024    COLONOSCOPY  3/26/2024    COLONOSCOPY DIAGNOSTIC performed by Tyrell England MD at [facility] OR    CYSTOSCOPY  11/28/2022    EXTRACORPOREAL SHOCK WAVE LITHOTRIPSY  06/20/2016    Procedure: EXTRACORPOREAL SHOCK WAVE LITHOTRIPSY, right needs  KADY; Surgeon: Geovanni Dominguez MD; Location: [facility]; Service: Urology Surgery; Laterality: Right; MR    EYE SURGERY      30 years ago laser to close holes in retina    KNEE ARTHROSCOPY Bilateral     meniscus    LITHOTRIPSY      3 times    PROSTATE BIOPSY  11/28/2022    PROSTATE BIOPSY N/A 11/28/2022    CYSTOSCOPY,  PROSTATE BIOPSY ULTRASOUND performed by Brian Dickey MD at [facility] OR    PROSTATECTOMY N/A 01/31/2023    PROSTATECTOMY LAPAROSCOPIC ROBOTIC XI   WITH  PELVIC LYMPH NODE DISSECTION performed by Sidney Sánchez MD at [facility] OR    TONSILLECTOMY AND ADENOIDECTOMY  1959       Medications Prior to Admission:    Prior to Admission medications    Medication Sig Start Date End Date Taking? Authorizing Provider   cefepime (MAXIPIME) infusion Infuse 2,000 mg intravenously in the morning and 2,000 mg in the evening. Do all this for 7 days. Compound per protocol. 10/22/24 10/29/24 Yes Jessa Warner MD   irbesartan (AVAPRO) 150 MG tablet Take 0.5 tablets by mouth daily 8/29/24  Yes Nisha Levin MD   simvastatin (ZOCOR) 20 MG tablet Take 1 tablet by mouth nightly 8/29/24  Yes Nisha Levin MD   pyRIDostigmine (MESTINON) 60 MG tablet take 1 tablet by mouth four times a day 6/17/24  Yes Nisha Levin MD   acetaminophen (TYLENOL) 500 MG tablet Take 2 tablets by mouth every 6 hours as needed for Pain   Yes Hima Vale MD   Cholecalciferol 2000 units TABS Take 2 tablets by mouth daily D3   Yes ProviderHima MD   Glucosamine HCl--250 MG TABS Take 2 tablets by mouth daily Move Free   Yes Hima Vale MD   meloxicam (MOBIC) 15 MG tablet take 1 tablet by mouth once daily  Patient not taking: Reported on 10/22/2024 5/28/24   Angelica Canseco, APRN - CNP   Handicap Placard MISC by Does not apply route 5 years, cannot walk over 200 ft. 3/23/23   Nisha Levin MD       Allergies:  Ciprofloxacin    Social History:  The patient currently lives at home    TOBACCO:

## 2024-10-23 NOTE — CARE COORDINATION
Continuity of Care Form    Patient Name: Chase Bang   :  1950  MRN:  [id number]    Admit date:  10/22/2024  Discharge date:  10/23/24    Code Status Order: Full Code   Advance Directives:   Advance Care Flowsheet Documentation             Admitting Physician:  Nisha Levin MD  PCP: Nisha Levin MD    Discharging Nurse:   Discharging Hospital Unit/Room#: 2073/2073-01  Discharging Unit Phone Number:     Emergency Contact:   Extended Emergency Contact Information  Primary Emergency Contact: Travis Bang  Home Phone: [phone]  Mobile Phone: [phone]  Relation: Brother/Sister  Secondary Emergency Contact: Viola Orosco  Home Phone: [phone]  Relation: Other    Past Surgical History:  Past Surgical History:   Procedure Laterality Date    COLONOSCOPY      2 times    COLONOSCOPY  2024    COLONOSCOPY  3/26/2024    COLONOSCOPY DIAGNOSTIC performed by Tyrell England MD at [facility] OR    CYSTOSCOPY  2022    EXTRACORPOREAL SHOCK WAVE LITHOTRIPSY  2016    Procedure: EXTRACORPOREAL SHOCK WAVE LITHOTRIPSY, right needs KUB; Surgeon: Geovanni Dominguez MD; Location: [facility]; Service: Urology Surgery; Laterality: Right; MR    EYE SURGERY      30 years ago laser to close holes in retina    KNEE ARTHROSCOPY Bilateral     meniscus    LITHOTRIPSY      3 times    PROSTATE BIOPSY  2022    PROSTATE BIOPSY N/A 2022    CYSTOSCOPY,  PROSTATE BIOPSY ULTRASOUND performed by Brian Dickey MD at [facility] OR    PROSTATECTOMY N/A 2023    PROSTATECTOMY LAPAROSCOPIC ROBOTIC XI   WITH  PELVIC LYMPH NODE DISSECTION performed by Sidney Sánchez MD at [facility] OR    TONSILLECTOMY AND ADENOIDECTOMY         Immunization History:   Immunization History   Administered Date(s) Administered    COVID-19, MODERNA Booster BLUE border, (age 18y+), IM, 50mcg/0.25mL 2022, 2022    COVID-19, US Vaccine, Vaccine Unspecified 2022, 2022    Influenza, FLUAD, (age 65  tablet  Commonly known as: ZOFRAN-ODT  Take 1 tablet by mouth every 8 hours as needed for Nausea or Vomiting     CONTINUE taking these medications    CONTINUE taking these medications  acetaminophen 500 MG tablet  Commonly known as: TYLENOL  Take 2 tablets by mouth every 6 hours as needed for Pain   Cholecalciferol 50 MCG (2000 UT) Tabs  Take 2 tablets by mouth daily D3   Glucosamine HCl--250 MG Tabs  Take 2 tablets by mouth daily Move Free   Handicap Placard Misc  by Does not apply route 5 years, cannot walk over 200 ft.   irbesartan 150 MG tablet  Commonly known as: AVAPRO  Take 0.5 tablets by mouth daily   pyRIDostigmine 60 MG tablet  Commonly known as: MESTINON  take 1 tablet by mouth four times a day   simvastatin 20 MG tablet  Commonly known as: ZOCOR  Take 1 tablet by mouth nightly     STOP taking these medications    STOP taking these medications  meloxicam 15 MG tablet  Commonly known as: MOBIC   Where to Get Your Medications      These medications were sent to [id number] - [place], [place] - [address] -  [phone] - F [phone]  [address] [place] [address]  Phone: [phone]       ibuprofen 800 MG tablet        ondansetron 4 MG disintegrating tablet      You can get these medications from any pharmacy    Bring a paper prescription for each of these medications      cefepime infusion             Printing Report

## 2024-10-23 NOTE — PLAN OF CARE
Problem: Discharge Planning  Goal: Discharge to home or other facility with appropriate resources  10/23/2024 0212 by Razia Valentino RN  Outcome: Progressing  Flowsheets (Taken 10/22/2024 2023)  Discharge to home or other facility with appropriate resources:   Identify barriers to discharge with patient and caregiver   Arrange for needed discharge resources and transportation as appropriate   Identify discharge learning needs (meds, wound care, etc)     Problem: Pain  Goal: Verbalizes/displays adequate comfort level or baseline comfort level  Outcome: Progressing     Problem: Safety - Adult  Goal: Free from fall injury  Outcome: Progressing     Problem: Skin/Tissue Integrity  Goal: Absence of new skin breakdown  Description: 1.  Monitor for areas of redness and/or skin breakdown  2.  Assess vascular access sites hourly  3.  Every 4-6 hours minimum:  Change oxygen saturation probe site  4.  Every 4-6 hours:  If on nasal continuous positive airway pressure, respiratory therapy assess nares and determine need for appliance change or resting period.  Outcome: Progressing

## 2024-10-23 NOTE — PLAN OF CARE
Problem: Discharge Planning  Goal: Discharge to home or other facility with appropriate resources  10/23/2024 1249 by Eli Ybarra RN  Outcome: Adequate for Discharge  10/23/2024 0212 by Razia Valentino RN  Outcome: Progressing  Flowsheets (Taken 10/22/2024 2023)  Discharge to home or other facility with appropriate resources:   Identify barriers to discharge with patient and caregiver   Arrange for needed discharge resources and transportation as appropriate   Identify discharge learning needs (meds, wound care, etc)     Problem: Pain  Goal: Verbalizes/displays adequate comfort level or baseline comfort level  10/23/2024 1249 by Eli Ybarra RN  Outcome: Adequate for Discharge  10/23/2024 0212 by Razia Valentino RN  Outcome: Progressing     Problem: Safety - Adult  Goal: Free from fall injury  10/23/2024 1249 by Eli Ybarra RN  Outcome: Adequate for Discharge  10/23/2024 0212 by Razia Valentino RN  Outcome: Progressing     Problem: Skin/Tissue Integrity  Goal: Absence of new skin breakdown  Description: 1.  Monitor for areas of redness and/or skin breakdown  2.  Assess vascular access sites hourly  3.  Every 4-6 hours minimum:  Change oxygen saturation probe site  4.  Every 4-6 hours:  If on nasal continuous positive airway pressure, respiratory therapy assess nares and determine need for appliance change or resting period.  10/23/2024 1249 by Eli Ybarra RN  Outcome: Adequate for Discharge  10/23/2024 0212 by Razia Valentino RN  Outcome: Progressing

## 2024-10-23 NOTE — CARE COORDINATION
ONGOING DISCHARGE PLAN:    Patient is alert and oriented x4.    Spoke with patient regarding discharge plan and patient confirms that plan is still to return to home w/ VNS, Jennifer Parikh.     Per ID, Pt. Will need IV Cefepime, 2000 mg, in the morning & in the evening. Will need for 7 days.    Script has already been faxed to [facility], from [facility].     Cost of Drug: $99.54    Cost of Supplies: $20.00 per Day.    Total cost for week would be about $240.00 for the week. This is affordable for the Pt.     Per [facility], They will deliver the med to his home, today between 11-3PM.     Jennifer Parikh will Start Care between 4:00-5:00 Today. Andriy, did confirm this.     Andriy was informed that the Pt will have someone meeting them at the house at 4PM, to assist w/ the IV antibiotics.     Will Fax [place] med list when completed.     Will continue to follow for additional discharge needs.    If patient is discharged prior to next notation, then this note serves as note for discharge by case management.    Electronically signed by Lynsey Cabrera RN on 10/23/2024 at 9:00 AM

## 2024-10-23 NOTE — PROGRESS NOTES
AVS reviewed with patient. All questions answered. Patient discharged with left brachial midline for long term antibiotics. Personal transportation arrived.

## 2024-10-24 ENCOUNTER — CARE COORDINATION (OUTPATIENT)
Dept: CARE COORDINATION | Age: 74
End: 2024-10-24

## 2024-10-24 DIAGNOSIS — N39.0 URINARY TRACT INFECTION DUE TO PSEUDOMONAS AERUGINOSA: Primary | ICD-10-CM

## 2024-10-24 DIAGNOSIS — B96.5 URINARY TRACT INFECTION DUE TO PSEUDOMONAS AERUGINOSA: Primary | ICD-10-CM

## 2024-10-24 PROCEDURE — 1111F DSCHRG MED/CURRENT MED MERGE: CPT

## 2024-10-24 NOTE — TELEPHONE ENCOUNTER
Ok to schedule with different provider or work into her schedule within the 7 day discharge please

## 2024-10-24 NOTE — CARE COORDINATION
Care Transitions Note    Initial Call - Call within 2 business days of discharge: Yes    Patient Current Location:  Home: [facility] [address]   [place] [address]    Care Transition Nurse contacted the patient by telephone to perform post hospital discharge assessment, verified name and  as identifiers. Provided introduction to self, and explanation of the Care Transition Nurse role.     Patient: Chase Bang    Patient : 1950   MRN: [id number]    Reason for Admission:   Discharge Date: 10/23/24  RURS: Readmission Risk Score: 8      Last Discharge Facility       Date Complaint Diagnosis Description Type Department Provider    10/22/24   Admission (Discharged) Nisha Hernández MD            Was this an external facility discharge? No    Additional needs identified to be addressed with provider   High priority:    *[facility] Follow-up    Discharge date: 10/23/24    Discharge hospital: [facility]    Diagnosis: UTI    Initial appointment date offered:   Reason patient wasn't scheduled: patient stated pcp on vacation    Patient needs: needs HFU appt when she gets back?    *Please schedule within 7 days of discharge and contact the patient.             Method of communication with provider: chart routing.    Patients top risk factors for readmission: functional physical ability and medication management    Interventions to address risk factors:   Education:   If you have a bacterial infection, your doctor may prescribe an antibiotic. Antibiotics are powerful drugs and  when used correctly, they can save lives. But like all medications, they can have side effects.  FINISH TAKING ALL YOUR ANTIBIOTICS  Feeling better doesn't mean your infection is gone and if you stop taking your antibiotics without your  physician's direction, your infection could still be active in your body, causing you to develop an antibiotic  resistant infection.  MANAGING SIDE EFFECTS  Like all medications, antibiotics have side

## 2024-10-28 NOTE — DISCHARGE SUMMARY
discharge.    Code Status:  Prior    Hospital Course: The patient was directly admitted by his PCP for a complicated UTI that has failed outpatient treatment. He has a hx of resistant pseudomonas UTI and cannot take Cipro or Levaquin due to Myasthenia Gravis. He was discharged on Cefepime 2 gm every 12 hours through 10/30/24.     Consults:  ID    Significant Diagnostic Studies: as above, and as follows: see chart    Treatments: as above    Disposition: home    Discharged Condition: Stable    Follow Up:  Nisha Levin MD in one week    Discharge Medications:      Medication List        START taking these medications      cefepime infusion  Commonly known as: MAXIPIME  Infuse 2,000 mg intravenously in the morning and 2,000 mg in the evening. Do all this for 7 days. Compound per protocol.     ibuprofen 800 MG tablet  Commonly known as: ADVIL;MOTRIN  Take 1 tablet by mouth in the morning and at bedtime     ondansetron 4 MG disintegrating tablet  Commonly known as: ZOFRAN-ODT  Take 1 tablet by mouth every 8 hours as needed for Nausea or Vomiting            CONTINUE taking these medications      acetaminophen 500 MG tablet  Commonly known as: TYLENOL     Cholecalciferol 50 MCG (2000 UT) Tabs     Glucosamine HCl--250 MG Tabs     Handicap Placard Misc  by Does not apply route 5 years, cannot walk over 200 ft.     irbesartan 150 MG tablet  Commonly known as: AVAPRO  Take 0.5 tablets by mouth daily     pyRIDostigmine 60 MG tablet  Commonly known as: MESTINON  take 1 tablet by mouth four times a day     simvastatin 20 MG tablet  Commonly known as: ZOCOR  Take 1 tablet by mouth nightly            STOP taking these medications      meloxicam 15 MG tablet  Commonly known as: MOBIC               Where to Get Your Medications        These medications were sent to [id number] - [place], [place] - [address] -  [phone] - F [phone]  [address] [address]      Phone: [phone]

## 2024-10-28 NOTE — PROGRESS NOTES
Physician Progress Note      PATIENT:               EDDIE MCNALLY  CSN #:                  [id number]  :                       1950  ADMIT DATE:       10/22/2024 1:31 PM  DISCH DATE:        10/23/2024 1:04 PM  RESPONDING  PROVIDER #:        No Schreiber MD          QUERY TEXT:    Patient admitted with UTI. Noted documentation in H&P on 10/23. In order to   support the diagnosis of UTI, please include additional clinical indicators in   your documentation.  Or please document if the diagnosis of UTI has been   ruled out after further study.    The medical record reflects the following:  Risk Factors: Advanced age,BPH  Clinical Indicators:H&P 10/23-Patient admitted with Resistant Pseudomonas UTI.  CN 10/22-   Pseudomonas aeruginosa UTI  He denies any fevers, chills, cough, congestion, chest pain, SOB, abdominal   pain, N/V, dysuria, headaches or confusion  ,Abdomen: Soft, non-tender or non-distended without rigidity or guarding and   positive bowel sounds all four quadrants.  WBC-24.5  Urine analysis, culture not available  Treatment: IV Zosyn, serial lab monitoring  Thank you  SAMMIE Hicks,CDS  Options provided:  -- UTI present as evidenced by, Please document evidence.  -- UTI  was ruled out  -- Other - I will add my own diagnosis  -- Disagree - Not applicable / Not valid  -- Disagree - Clinically unable to determine / Unknown  -- Refer to Clinical Documentation Reviewer    PROVIDER RESPONSE TEXT:    UTI is present as evidenced by WBC=24.5 and failure of outpatient treatment   with oral antibiotics. Also, the patient has a hx of resistant pseudomonas   UTI.    Query created by: Kurt Wick on 10/24/2024 1:11 PM      Electronically signed by:  No Schreiber MD 10/28/2024 3:23 AM

## 2024-10-30 ENCOUNTER — TELEPHONE (OUTPATIENT)
Dept: INFECTIOUS DISEASES | Age: 74
End: 2024-10-30

## 2024-10-30 NOTE — TELEPHONE ENCOUNTER
Home care nurse called, IV antibiotics are due to end and line is to be pulled. Pt's WBC is chronically elevated, they are asking if you can review and advise if the line can be pulled?

## 2024-10-31 ENCOUNTER — CARE COORDINATION (OUTPATIENT)
Dept: CARE COORDINATION | Age: 74
End: 2024-10-31

## 2024-10-31 NOTE — CARE COORDINATION
Care Transitions Note    Follow Up Call     Patient Current Location:  Home: [address]   [place] [address]    LPN Care Coordinator contacted the patient by telephone. Verified name and  as identifiers.    Additional needs identified to be addressed with provider   FYI: Patient completed IV antibiotics yesterday and had IV removed. He reports one episode of dysuria this morning. No other symptoms. He will disciss with you at his appointment this afternoon.                  Method of communication with provider: none.    Care Summary Note: Writer spoke with Chase for a follow up care transitions call. He states he finished his IV antibiotics and had his midline pulled yesterday. He denies having any fever or chills, but states he did have some slight stinging at the start of urinating this morning. He states his urine is light yellow and doesn't have any blood or strong smell to it. He is seeing his PCP this afternoon and will mention this to her. He denies having any other need or concerns at this time.     Plan of care updates since last contact:  Review of patient management of conditions/medications:         Advance Care Planning:   Does patient have an Advance Directive: reviewed during previous call, see note. .    Medication Review:  Completed IV antibiotics    Remote Patient Monitoring:  Offered patient enrollment in the Remote Patient Monitoring (RPM) program for in-home monitoring: Yes, but did not enroll at this time: declined to enroll in the program becausenot interested in RPM .    Assessments:  Care Transitions Subsequent and Final Call    Subsequent and Final Calls  Do you have any ongoing symptoms?: No  Have your medications changed?: No  Do you have any questions related to your medications?: No  Do you currently have any active services?: No  Do you have any needs or concerns that I can assist you with?: No  Identified Barriers: None  Care Transitions Interventions     Other Services:

## 2024-10-31 NOTE — TELEPHONE ENCOUNTER
Patient home care nurse called and stated that after the line has been pulled patient asked her what to do with all the extra supplies.  Patient need up pulling 2 more antibiotic doses out of the fridge.  Patient is insistent that he had 14 doses.  I told his nurse that its not likely that we will put a mid line back in for 2 doses.

## 2024-11-07 ENCOUNTER — CARE COORDINATION (OUTPATIENT)
Dept: CARE COORDINATION | Age: 74
End: 2024-11-07

## 2024-11-07 ENCOUNTER — HOSPITAL ENCOUNTER (OUTPATIENT)
Age: 74
Setting detail: SPECIMEN
Discharge: HOME | End: 2024-11-07

## 2024-11-07 DIAGNOSIS — D72.829 LEUKOCYTOSIS, UNSPECIFIED TYPE: ICD-10-CM

## 2024-11-07 DIAGNOSIS — Z09 HOSPITAL DISCHARGE FOLLOW-UP: ICD-10-CM

## 2024-11-07 LAB
BACTERIA URNS QL MICRO: NORMAL
BASOPHILS # BLD: 0 K/UL (ref 0–0.2)
BASOPHILS NFR BLD: 0 % (ref 0–2)
BILIRUB UR QL STRIP: ABNORMAL
CASTS #/AREA URNS LPF: NORMAL /LPF (ref 0–8)
CLARITY UR: ABNORMAL
COLOR UR: ABNORMAL
EOSINOPHIL # BLD: 0 K/UL (ref 0–0.4)
EOSINOPHILS RELATIVE PERCENT: 0 % (ref 1–4)
EPI CELLS #/AREA URNS HPF: NORMAL /HPF (ref 0–5)
ERYTHROCYTE [DISTWIDTH] IN BLOOD BY AUTOMATED COUNT: 14.7 % (ref 11.8–14.4)
GLUCOSE UR STRIP-MCNC: NEGATIVE MG/DL
HCT VFR BLD AUTO: 40.5 % (ref 40.7–50.3)
HGB BLD-MCNC: 12.4 G/DL (ref 13–17)
HGB UR QL STRIP.AUTO: NEGATIVE
IMM GRANULOCYTES # BLD AUTO: 0 K/UL (ref 0–0.3)
IMM GRANULOCYTES NFR BLD: 0 %
KETONES UR STRIP-MCNC: ABNORMAL MG/DL
LEUKOCYTE ESTERASE UR QL STRIP: ABNORMAL
LYMPHOCYTES NFR BLD: 18.17 K/UL (ref 1–4.8)
LYMPHOCYTES RELATIVE PERCENT: 76 % (ref 24–44)
MCH RBC QN AUTO: 30 PG (ref 25.2–33.5)
MCHC RBC AUTO-ENTMCNC: 30.6 G/DL (ref 28.4–34.8)
MCV RBC AUTO: 98.1 FL (ref 82.6–102.9)
MONOCYTES NFR BLD: 1.67 K/UL (ref 0.1–0.8)
MONOCYTES NFR BLD: 7 % (ref 1–7)
MORPHOLOGY: ABNORMAL
MORPHOLOGY: ABNORMAL
NEUTROPHILS NFR BLD: 17 % (ref 36–66)
NEUTS SEG NFR BLD: 4.06 K/UL (ref 1.8–7.7)
NITRITE UR QL STRIP: POSITIVE
NRBC BLD-RTO: 0 PER 100 WBC
PH UR STRIP: 5.5 [PH] (ref 5–8)
PLATELET # BLD AUTO: 165 K/UL (ref 138–453)
PLATELET, FLUORESCENCE: 165 K/UL (ref 138–453)
PMV BLD AUTO: 11.1 FL (ref 8.1–13.5)
PROT UR STRIP-MCNC: ABNORMAL MG/DL
RBC # BLD AUTO: 4.13 M/UL (ref 4.21–5.77)
RBC #/AREA URNS HPF: NORMAL /HPF (ref 0–4)
SP GR UR STRIP: 1.03 (ref 1–1.03)
UROBILINOGEN UR STRIP-ACNC: NORMAL EU/DL (ref 0–1)
WBC #/AREA URNS HPF: NORMAL /HPF (ref 0–5)
WBC OTHER # BLD: 23.9 K/UL (ref 3.5–11.3)

## 2024-11-07 NOTE — CARE COORDINATION
Care Transitions Note    Follow Up Call     Patient Current Location:  Home: [facility] [address]   [place] [address]    N Care Coordinator contacted the patient by telephone. Verified name and  as identifiers.    Additional needs identified to be addressed with provider   No needs identified                 Method of communication with provider: chart routing.    Care Summary Note: Writer spoke to Chase for follow up transitional care call. He stated he is not having dysuria , flank pain , fever, chills , N/V/D . He stated that urine is a pale yellow color with no odor. He had HFU on 10/31 cbc done and UA/culture. Writer advised pcp office will let him know what medication is advised. Thanked writer for calling.     Plan of care updates since last contact:  Review of patient management of conditions/medications: UTI        Advance Care Planning:   Does patient have an Advance Directive: reviewed during previous call, see note. .    Medication Review:  No changes since last call.     Remote Patient Monitoring:  Offered patient enrollment in the Remote Patient Monitoring (RPM) program for in-home monitoring: Yes, but did not enroll at this time: declined to enroll in the program becausedeclined  .    Assessments:  Care Transitions Subsequent and Final Call    Subsequent and Final Calls  Care Transitions Interventions     Other Services: Completed     Specialty Service Referral: Declined    Other Interventions:              Follow Up Appointment:   Reviewed upcoming appointment(s).  Future Appointments         Provider Specialty Dept Phone    2025 11:20 AM Nisha Levin MD Family Medicine [phone]            LPN Care Coordinator provided contact information.  Plan for follow-up call in 2-5 days based on severity of symptoms and risk factors.  Plan for next call: symptom management-medication /UA ?       Liat Doherty LPN

## 2024-11-10 LAB
MICROORGANISM SPEC CULT: ABNORMAL
MICROORGANISM SPEC CULT: ABNORMAL
SPECIMEN DESCRIPTION: ABNORMAL

## 2024-11-11 ENCOUNTER — CARE COORDINATION (OUTPATIENT)
Dept: CARE COORDINATION | Age: 74
End: 2024-11-11

## 2024-11-11 NOTE — CARE COORDINATION
Care Transitions Note    Follow Up Call     Patient Current Location:  Home: [address]   [place] [address]    N Care Coordinator contacted the patient by telephone. Verified name and  as identifiers.    Additional needs identified to be addressed with provider   No needs identified                 Method of communication with provider: none.    Care Summary Note: Writer spoke to Father Chase he returned writers call. He voiced no needs or concerns at this time. Writer was calling to see how pt is doing. He stated he is fine. Advised pcp  office will reach out to him when UA comes back. He verbalized understanding. Thanked writer for calling.     Plan of care updates since last contact:  Review of patient management of conditions/medications: UA        Advance Care Planning:   Does patient have an Advance Directive: reviewed during previous call, see note. .    Medication Review:  No changes since last call.     Remote Patient Monitoring:  Offered patient enrollment in the Remote Patient Monitoring (RPM) program for in-home monitoring: Yes, but did not enroll at this time: declined to enroll in the program becauseDECLINED  .    Assessments:  Care Transitions Subsequent and Final Call    Subsequent and Final Calls  Care Transitions Interventions     Other Services: Completed     Specialty Service Referral: Declined    Other Interventions:              Follow Up Appointment:   Reviewed upcoming appointment(s).  Future Appointments         Provider Specialty Dept Phone    2025 11:20 AM Nisha Levin MD Family Medicine [phone]            N Care Coordinator provided contact information.  Plan for follow-up call in 6-10 days based on severity of symptoms and risk factors.  Plan for next call: symptom management-UTI symptoms UA back ?       Liat Doherty LPN

## 2024-11-19 ENCOUNTER — CARE COORDINATION (OUTPATIENT)
Dept: CARE COORDINATION | Age: 74
End: 2024-11-19

## 2024-11-19 NOTE — CARE COORDINATION
Care Transitions Note    Final Call     Patient Current Location:  Home: [facility] [address]   [facility] [address]    LPN Care Coordinator contacted the patient by telephone. Verified name and  as identifiers.    Additional needs identified to be addressed with provider   No needs identified                 Method of communication with provider: none.    Care Summary Note: Writer spoke to Chaes for follow up transitional care call. He does not have any UTI symptoms he denies hematuria or dysuria, fever or chills he stated that doctors office reached out to him regarding UA writer advised him to let provider know if he has any any s/s of UTI. Well enough to end transitional care calls thanked writer for calling.     Plan of care updates since last contact:  UTI S/S        Advance Care Planning:   Does patient have an Advance Directive: deferred at this time, will discuss on future follow up. .    Medication Review:  No changes since last call.     Remote Patient Monitoring:  Offered patient enrollment in the Remote Patient Monitoring (RPM) program for in-home monitoring: Patient is not eligible for RPM program because: patient does not have qualifying diagnosis.    Assessments:  Care Transitions Subsequent and Final Call    Subsequent and Final Calls  Care Transitions Interventions     Other Services: Completed     Specialty Service Referral: Declined    Other Interventions:              Follow Up Appointment:   Reviewed upcoming appointment(s).  Future Appointments         Provider Specialty Dept Phone    2025 11:20 AM Nisha Levin MD Family Medicine [phone]            Patient graduated from the Care Transitions program on 24.    Handoff:   Patient was not referred to the ACM team due to no additional needs identified.       Patient has agreed to contact primary care provider and/or specialist for any further questions, concerns, or needs.    Liat Doherty LPN

## 2024-12-25 ENCOUNTER — APPOINTMENT (OUTPATIENT)
Dept: GENERAL RADIOLOGY | Age: 74
DRG: 313 | End: 2024-12-25
Payer: MEDICARE

## 2024-12-25 ENCOUNTER — HOSPITAL ENCOUNTER (EMERGENCY)
Age: 74
Discharge: HOME | DRG: 313 | End: 2024-12-25
Attending: EMERGENCY MEDICINE
Payer: MEDICARE

## 2024-12-25 VITALS
TEMPERATURE: 98.4 F | SYSTOLIC BLOOD PRESSURE: 110 MMHG | RESPIRATION RATE: 19 BRPM | WEIGHT: 181 LBS | BODY MASS INDEX: 25.97 KG/M2 | DIASTOLIC BLOOD PRESSURE: 81 MMHG | HEART RATE: 71 BPM | OXYGEN SATURATION: 93 %

## 2024-12-25 DIAGNOSIS — I48.0 PAROXYSMAL ATRIAL FIBRILLATION (HCC): Primary | ICD-10-CM

## 2024-12-25 LAB
ALBUMIN SERPL-MCNC: 4.4 G/DL (ref 3.5–5.2)
ALP SERPL-CCNC: 73 U/L (ref 40–129)
ALT SERPL-CCNC: 16 U/L (ref 10–50)
ANION GAP SERPL CALCULATED.3IONS-SCNC: 15 MMOL/L (ref 9–16)
AST SERPL-CCNC: 24 U/L (ref 10–50)
BASOPHILS # BLD: 0 K/UL (ref 0–0.2)
BASOPHILS NFR BLD: 0 % (ref 0–2)
BILIRUB SERPL-MCNC: 0.5 MG/DL (ref 0–1.2)
BUN SERPL-MCNC: 10 MG/DL (ref 8–23)
CALCIUM SERPL-MCNC: 9.4 MG/DL (ref 8.6–10.4)
CHLORIDE SERPL-SCNC: 102 MMOL/L (ref 98–107)
CO2 SERPL-SCNC: 21 MMOL/L (ref 20–31)
CREAT SERPL-MCNC: 0.8 MG/DL (ref 0.7–1.2)
EOSINOPHIL # BLD: 0 K/UL (ref 0–0.4)
EOSINOPHILS RELATIVE PERCENT: 0 % (ref 0–4)
ERYTHROCYTE [DISTWIDTH] IN BLOOD BY AUTOMATED COUNT: 15.3 % (ref 11.5–14.9)
GFR, ESTIMATED: >90 ML/MIN/1.73M2
GLUCOSE SERPL-MCNC: 128 MG/DL (ref 74–99)
HCT VFR BLD AUTO: 44 % (ref 41–53)
HGB BLD-MCNC: 14.6 G/DL (ref 13.5–17.5)
INR PPP: 1
LYMPHOCYTES NFR BLD: 26.72 K/UL (ref 1–4.8)
LYMPHOCYTES RELATIVE PERCENT: 77 % (ref 24–44)
MAGNESIUM SERPL-MCNC: 2.6 MG/DL (ref 1.6–2.4)
MCH RBC QN AUTO: 31.9 PG (ref 26–34)
MCHC RBC AUTO-ENTMCNC: 33.1 G/DL (ref 31–37)
MCV RBC AUTO: 96.2 FL (ref 80–100)
MONOCYTES NFR BLD: 0.35 K/UL (ref 0.1–1.3)
MONOCYTES NFR BLD: 1 % (ref 1–7)
MORPHOLOGY: ABNORMAL
MORPHOLOGY: ABNORMAL
NEUTROPHILS NFR BLD: 22 % (ref 36–66)
NEUTS SEG NFR BLD: 7.63 K/UL (ref 1.3–9.1)
PARTIAL THROMBOPLASTIN TIME: 26.9 SEC (ref 24–36)
PLATELET # BLD AUTO: 175 K/UL (ref 150–450)
PMV BLD AUTO: 8.7 FL (ref 6–12)
POTASSIUM SERPL-SCNC: 3.7 MMOL/L (ref 3.7–5.3)
PROT SERPL-MCNC: 6.6 G/DL (ref 6.6–8.7)
PROTHROMBIN TIME: 13.2 SEC (ref 11.8–14.6)
RBC # BLD AUTO: 4.58 M/UL (ref 4.5–5.9)
SODIUM SERPL-SCNC: 138 MMOL/L (ref 136–145)
TROPONIN I SERPL HS-MCNC: 32 NG/L (ref 0–22)
TROPONIN I SERPL HS-MCNC: 37 NG/L (ref 0–22)
TROPONIN I SERPL HS-MCNC: 40 NG/L (ref 0–22)
TSH SERPL DL<=0.05 MIU/L-ACNC: 1.23 UIU/ML (ref 0.27–4.2)
WBC OTHER # BLD: 34.7 K/UL (ref 3.5–11)

## 2024-12-25 PROCEDURE — 96360 HYDRATION IV INFUSION INIT: CPT

## 2024-12-25 PROCEDURE — 93005 ELECTROCARDIOGRAM TRACING: CPT | Performed by: EMERGENCY MEDICINE

## 2024-12-25 PROCEDURE — 36415 COLL VENOUS BLD VENIPUNCTURE: CPT

## 2024-12-25 PROCEDURE — 84484 ASSAY OF TROPONIN QUANT: CPT

## 2024-12-25 PROCEDURE — 99285 EMERGENCY DEPT VISIT HI MDM: CPT

## 2024-12-25 PROCEDURE — 71045 X-RAY EXAM CHEST 1 VIEW: CPT

## 2024-12-25 PROCEDURE — 85730 THROMBOPLASTIN TIME PARTIAL: CPT

## 2024-12-25 PROCEDURE — 85025 COMPLETE CBC W/AUTO DIFF WBC: CPT

## 2024-12-25 PROCEDURE — 80053 COMPREHEN METABOLIC PANEL: CPT

## 2024-12-25 PROCEDURE — 85610 PROTHROMBIN TIME: CPT

## 2024-12-25 PROCEDURE — 2580000003 HC RX 258: Performed by: EMERGENCY MEDICINE

## 2024-12-25 PROCEDURE — 84443 ASSAY THYROID STIM HORMONE: CPT

## 2024-12-25 PROCEDURE — 83735 ASSAY OF MAGNESIUM: CPT

## 2024-12-25 RX ORDER — DILTIAZEM HYDROCHLORIDE 5 MG/ML
INJECTION INTRAVENOUS
Status: DISCONTINUED
Start: 2024-12-25 | End: 2024-12-25 | Stop reason: WASHOUT

## 2024-12-25 RX ORDER — DILTIAZEM HYDROCHLORIDE 5 MG/ML
15 INJECTION INTRAVENOUS ONCE
Status: DISCONTINUED | OUTPATIENT
Start: 2024-12-25 | End: 2024-12-25 | Stop reason: HOSPADM

## 2024-12-25 RX ORDER — 0.9 % SODIUM CHLORIDE 0.9 %
1000 INTRAVENOUS SOLUTION INTRAVENOUS ONCE
Status: COMPLETED | OUTPATIENT
Start: 2024-12-25 | End: 2024-12-25

## 2024-12-25 RX ORDER — DILTIAZEM HYDROCHLORIDE 120 MG/1
120 CAPSULE, COATED, EXTENDED RELEASE ORAL DAILY
Qty: 30 CAPSULE | Refills: 0 | Status: SHIPPED | OUTPATIENT
Start: 2024-12-25 | End: 2025-01-24

## 2024-12-25 RX ADMIN — SODIUM CHLORIDE 1000 ML: 9 INJECTION, SOLUTION INTRAVENOUS at 17:31

## 2024-12-25 ASSESSMENT — LIFESTYLE VARIABLES
HOW OFTEN DO YOU HAVE A DRINK CONTAINING ALCOHOL: 2-3 TIMES A WEEK
HOW MANY STANDARD DRINKS CONTAINING ALCOHOL DO YOU HAVE ON A TYPICAL DAY: 1 OR 2

## 2024-12-25 ASSESSMENT — ENCOUNTER SYMPTOMS
VOMITING: 0
SHORTNESS OF BREATH: 0
NAUSEA: 0

## 2024-12-25 NOTE — ED PROVIDER NOTES
EMERGENCY DEPARTMENT ENCOUNTER    Pt Name: Chase Bang  MRN: [id number]  Birthdate 1950  Date of evaluation: 12/25/24  CHIEF COMPLAINT       Chief Complaint   Patient presents with    Chest Pain     HISTORY OF PRESENT ILLNESS   Patient is presenting for tachycardia.  He said that he was feeling fatigued and felt like his heart was beating out of his chest.  Patient was drinking martinis this evening when his symptoms started.  He has never had anything like this in the past.  He denies any chest pain or shortness of breath.    The history is provided by the patient.           REVIEW OF SYSTEMS     Review of Systems   Constitutional:  Positive for fatigue. Negative for chills and fever.   HENT:  Negative for congestion.    Respiratory:  Negative for shortness of breath.    Cardiovascular:  Positive for palpitations. Negative for chest pain.   Gastrointestinal:  Negative for nausea and vomiting.   Neurological:  Positive for light-headedness. Negative for headaches.     PASTMEDICAL HISTORY     Past Medical History:   Diagnosis Date    Arthritis     right shoulder/right sacroiliac    BPH (benign prostatic hyperplasia)     Caffeine use     2 cups coffee/day    Chronic back pain     Chronic lymphocytic leukemia (HCC)     numbers are stable. [facility] Dr. Faraz Santacruz. last appt 2020    COVID-19 10/22/2022    body aches, sinus congestion x 3 days, felt \"normal\" by day 5    Elevated PSA     Erectile dysfunction 1/31/2023    prostetectomy    History of myasthenia gravis     occular    Hyperlipidemia     Hypertension     Hypoglycemia     Kidney stones     Lightheadedness     Patient states dizziness/lightheadedness w/hypoglycemia    Ocular myasthenia gravis (HCC)     Prostate cancer (HCC) 12/2022    PVC's (premature ventricular contractions)     Sciatica     lower back    Seasonal allergies     Under care of team     Neurology Dr. Crandall/ [place], [place]/ last seen 9-7-23    Vitamin D deficiency     Wears glasses     Wellness       DISPOSITION/PLAN   DISPOSITION Decision To Discharge 12/25/2024 06:15:06 PM   DISPOSITION CONDITION Stable           OUTPATIENT FOLLOW UP THE PATIENT:  [facility]  [address]  [address]  [place] [address]  [phone]  Schedule an appointment as soon as possible for a visit       Nisha Levin MD  [address]   [address]  [place] [address]  [phone]          [facility] Emergency Department  [address] [place] [address]  [phone]  Go to   As needed, If symptoms worsen      DO Isma Bey Mansour Mohamed I, DO  12/25/24 1106

## 2024-12-26 LAB
EKG ATRIAL RATE: 72 BPM
EKG P AXIS: 40 DEGREES
EKG P-R INTERVAL: 112 MS
EKG Q-T INTERVAL: 278 MS
EKG Q-T INTERVAL: 394 MS
EKG QRS DURATION: 86 MS
EKG QRS DURATION: 90 MS
EKG QTC CALCULATION (BAZETT): 431 MS
EKG QTC CALCULATION (BAZETT): 433 MS
EKG R AXIS: 35 DEGREES
EKG R AXIS: 53 DEGREES
EKG T AXIS: 40 DEGREES
EKG T AXIS: 52 DEGREES
EKG VENTRICULAR RATE: 146 BPM
EKG VENTRICULAR RATE: 72 BPM

## 2024-12-26 PROCEDURE — 93010 ELECTROCARDIOGRAM REPORT: CPT | Performed by: INTERNAL MEDICINE

## 2024-12-27 ENCOUNTER — APPOINTMENT (OUTPATIENT)
Dept: GENERAL RADIOLOGY | Age: 74
DRG: 313 | End: 2024-12-27
Payer: MEDICARE

## 2024-12-27 ENCOUNTER — HOSPITAL ENCOUNTER (INPATIENT)
Age: 74
LOS: 1 days | Discharge: HOME | DRG: 313 | End: 2024-12-27
Attending: STUDENT IN AN ORGANIZED HEALTH CARE EDUCATION/TRAINING PROGRAM | Admitting: STUDENT IN AN ORGANIZED HEALTH CARE EDUCATION/TRAINING PROGRAM
Payer: MEDICARE

## 2024-12-27 ENCOUNTER — APPOINTMENT (OUTPATIENT)
Age: 74
DRG: 313 | End: 2024-12-27
Attending: INTERNAL MEDICINE
Payer: MEDICARE

## 2024-12-27 VITALS
BODY MASS INDEX: 24.34 KG/M2 | SYSTOLIC BLOOD PRESSURE: 152 MMHG | WEIGHT: 170 LBS | TEMPERATURE: 97.9 F | OXYGEN SATURATION: 98 % | RESPIRATION RATE: 18 BRPM | DIASTOLIC BLOOD PRESSURE: 81 MMHG | HEIGHT: 70 IN | HEART RATE: 55 BPM

## 2024-12-27 DIAGNOSIS — I20.9 ANGINA PECTORIS (HCC): ICD-10-CM

## 2024-12-27 DIAGNOSIS — R07.9 CHEST PAIN, UNSPECIFIED TYPE: Primary | ICD-10-CM

## 2024-12-27 LAB
ANION GAP SERPL CALCULATED.3IONS-SCNC: 10 MMOL/L (ref 9–16)
BASOPHILS # BLD: 0.28 K/UL (ref 0–0.2)
BASOPHILS NFR BLD: 1 % (ref 0–2)
BUN SERPL-MCNC: 15 MG/DL (ref 8–23)
CALCIUM SERPL-MCNC: 8.9 MG/DL (ref 8.6–10.4)
CHLORIDE SERPL-SCNC: 108 MMOL/L (ref 98–107)
CO2 SERPL-SCNC: 23 MMOL/L (ref 20–31)
CREAT SERPL-MCNC: 0.7 MG/DL (ref 0.7–1.2)
EKG ATRIAL RATE: 53 BPM
EKG P AXIS: 0 DEGREES
EKG P-R INTERVAL: 102 MS
EKG Q-T INTERVAL: 450 MS
EKG QRS DURATION: 84 MS
EKG QTC CALCULATION (BAZETT): 422 MS
EKG R AXIS: 50 DEGREES
EKG T AXIS: 53 DEGREES
EKG VENTRICULAR RATE: 53 BPM
EOSINOPHIL # BLD: 0.28 K/UL (ref 0–0.4)
EOSINOPHILS RELATIVE PERCENT: 1 % (ref 0–4)
ERYTHROCYTE [DISTWIDTH] IN BLOOD BY AUTOMATED COUNT: 15.9 % (ref 11.5–14.9)
GFR, ESTIMATED: >90 ML/MIN/1.73M2
GLUCOSE SERPL-MCNC: 113 MG/DL (ref 74–99)
HCT VFR BLD AUTO: 41.2 % (ref 41–53)
HGB BLD-MCNC: 13.7 G/DL (ref 13.5–17.5)
LYMPHOCYTES NFR BLD: 23.29 K/UL (ref 1–4.8)
LYMPHOCYTES RELATIVE PERCENT: 82 % (ref 24–44)
MAGNESIUM SERPL-MCNC: 2.2 MG/DL (ref 1.6–2.4)
MCH RBC QN AUTO: 32.1 PG (ref 26–34)
MCHC RBC AUTO-ENTMCNC: 33.2 G/DL (ref 31–37)
MCV RBC AUTO: 96.6 FL (ref 80–100)
MONOCYTES NFR BLD: 0.57 K/UL (ref 0.1–1.3)
MONOCYTES NFR BLD: 2 % (ref 1–7)
MORPHOLOGY: ABNORMAL
MORPHOLOGY: ABNORMAL
NEUTROPHILS NFR BLD: 14 % (ref 36–66)
NEUTS SEG NFR BLD: 3.98 K/UL (ref 1.3–9.1)
PLATELET # BLD AUTO: 154 K/UL (ref 150–450)
PMV BLD AUTO: 8.2 FL (ref 6–12)
POTASSIUM SERPL-SCNC: 4.5 MMOL/L (ref 3.7–5.3)
RBC # BLD AUTO: 4.27 M/UL (ref 4.5–5.9)
SODIUM SERPL-SCNC: 141 MMOL/L (ref 136–145)
TROPONIN I SERPL HS-MCNC: 22 NG/L (ref 0–22)
TROPONIN I SERPL HS-MCNC: 23 NG/L (ref 0–22)
WBC OTHER # BLD: 28.4 K/UL (ref 3.5–11)

## 2024-12-27 PROCEDURE — 99235 HOSP IP/OBS SAME DATE MOD 70: CPT | Performed by: STUDENT IN AN ORGANIZED HEALTH CARE EDUCATION/TRAINING PROGRAM

## 2024-12-27 PROCEDURE — 2500000003 HC RX 250 WO HCPCS: Performed by: STUDENT IN AN ORGANIZED HEALTH CARE EDUCATION/TRAINING PROGRAM

## 2024-12-27 PROCEDURE — 93005 ELECTROCARDIOGRAM TRACING: CPT | Performed by: STUDENT IN AN ORGANIZED HEALTH CARE EDUCATION/TRAINING PROGRAM

## 2024-12-27 PROCEDURE — 6370000000 HC RX 637 (ALT 250 FOR IP): Performed by: STUDENT IN AN ORGANIZED HEALTH CARE EDUCATION/TRAINING PROGRAM

## 2024-12-27 PROCEDURE — 71046 X-RAY EXAM CHEST 2 VIEWS: CPT

## 2024-12-27 PROCEDURE — 84484 ASSAY OF TROPONIN QUANT: CPT

## 2024-12-27 PROCEDURE — 83735 ASSAY OF MAGNESIUM: CPT

## 2024-12-27 PROCEDURE — 1200000000 HC SEMI PRIVATE

## 2024-12-27 PROCEDURE — 99222 1ST HOSP IP/OBS MODERATE 55: CPT | Performed by: INTERNAL MEDICINE

## 2024-12-27 PROCEDURE — 36415 COLL VENOUS BLD VENIPUNCTURE: CPT

## 2024-12-27 PROCEDURE — 80048 BASIC METABOLIC PNL TOTAL CA: CPT

## 2024-12-27 PROCEDURE — 85025 COMPLETE CBC W/AUTO DIFF WBC: CPT

## 2024-12-27 PROCEDURE — 99285 EMERGENCY DEPT VISIT HI MDM: CPT

## 2024-12-27 RX ORDER — POTASSIUM CHLORIDE 7.45 MG/ML
10 INJECTION INTRAVENOUS PRN
Status: DISCONTINUED | OUTPATIENT
Start: 2024-12-27 | End: 2024-12-27 | Stop reason: HOSPADM

## 2024-12-27 RX ORDER — BISACODYL 5 MG/1
5 TABLET, DELAYED RELEASE ORAL DAILY PRN
Status: DISCONTINUED | OUTPATIENT
Start: 2024-12-27 | End: 2024-12-27 | Stop reason: HOSPADM

## 2024-12-27 RX ORDER — LOSARTAN POTASSIUM 50 MG/1
50 TABLET ORAL DAILY
Status: DISCONTINUED | OUTPATIENT
Start: 2024-12-27 | End: 2024-12-27 | Stop reason: HOSPADM

## 2024-12-27 RX ORDER — ACETAMINOPHEN 500 MG
1000 TABLET ORAL EVERY 6 HOURS PRN
Status: DISCONTINUED | OUTPATIENT
Start: 2024-12-27 | End: 2024-12-27 | Stop reason: HOSPADM

## 2024-12-27 RX ORDER — DILTIAZEM HYDROCHLORIDE 120 MG/1
120 CAPSULE, COATED, EXTENDED RELEASE ORAL DAILY
Status: DISCONTINUED | OUTPATIENT
Start: 2024-12-27 | End: 2024-12-27 | Stop reason: HOSPADM

## 2024-12-27 RX ORDER — MAGNESIUM SULFATE HEPTAHYDRATE 40 MG/ML
2000 INJECTION, SOLUTION INTRAVENOUS PRN
Status: DISCONTINUED | OUTPATIENT
Start: 2024-12-27 | End: 2024-12-27 | Stop reason: HOSPADM

## 2024-12-27 RX ORDER — NITROGLYCERIN 0.4 MG/1
0.4 TABLET SUBLINGUAL EVERY 5 MIN PRN
Qty: 25 TABLET | Refills: 3 | Status: SHIPPED | OUTPATIENT
Start: 2024-12-27

## 2024-12-27 RX ORDER — FAMOTIDINE 20 MG/1
20 TABLET, FILM COATED ORAL 2 TIMES DAILY PRN
Status: DISCONTINUED | OUTPATIENT
Start: 2024-12-27 | End: 2024-12-27 | Stop reason: HOSPADM

## 2024-12-27 RX ORDER — ASPIRIN 81 MG/1
324 TABLET, CHEWABLE ORAL ONCE
Status: COMPLETED | OUTPATIENT
Start: 2024-12-27 | End: 2024-12-27

## 2024-12-27 RX ORDER — POTASSIUM CHLORIDE 1500 MG/1
40 TABLET, EXTENDED RELEASE ORAL PRN
Status: DISCONTINUED | OUTPATIENT
Start: 2024-12-27 | End: 2024-12-27 | Stop reason: HOSPADM

## 2024-12-27 RX ORDER — ONDANSETRON 4 MG/1
4 TABLET, ORALLY DISINTEGRATING ORAL EVERY 8 HOURS PRN
Status: DISCONTINUED | OUTPATIENT
Start: 2024-12-27 | End: 2024-12-27 | Stop reason: HOSPADM

## 2024-12-27 RX ORDER — ATORVASTATIN CALCIUM 10 MG/1
10 TABLET, FILM COATED ORAL DAILY
Status: DISCONTINUED | OUTPATIENT
Start: 2024-12-27 | End: 2024-12-27 | Stop reason: HOSPADM

## 2024-12-27 RX ORDER — SODIUM CHLORIDE 0.9 % (FLUSH) 0.9 %
5-40 SYRINGE (ML) INJECTION EVERY 12 HOURS SCHEDULED
Status: DISCONTINUED | OUTPATIENT
Start: 2024-12-27 | End: 2024-12-27 | Stop reason: HOSPADM

## 2024-12-27 RX ORDER — ONDANSETRON 2 MG/ML
4 INJECTION INTRAMUSCULAR; INTRAVENOUS EVERY 6 HOURS PRN
Status: DISCONTINUED | OUTPATIENT
Start: 2024-12-27 | End: 2024-12-27 | Stop reason: HOSPADM

## 2024-12-27 RX ORDER — SODIUM CHLORIDE 0.9 % (FLUSH) 0.9 %
5-40 SYRINGE (ML) INJECTION PRN
Status: DISCONTINUED | OUTPATIENT
Start: 2024-12-27 | End: 2024-12-27 | Stop reason: HOSPADM

## 2024-12-27 RX ORDER — SODIUM CHLORIDE 9 MG/ML
INJECTION, SOLUTION INTRAVENOUS PRN
Status: DISCONTINUED | OUTPATIENT
Start: 2024-12-27 | End: 2024-12-27 | Stop reason: HOSPADM

## 2024-12-27 RX ORDER — PYRIDOSTIGMINE BROMIDE 60 MG/1
60 TABLET ORAL 4 TIMES DAILY
Status: DISCONTINUED | OUTPATIENT
Start: 2024-12-27 | End: 2024-12-27 | Stop reason: HOSPADM

## 2024-12-27 RX ADMIN — PYRIDOSTIGMINE BROMIDE 60 MG: 60 TABLET ORAL at 10:35

## 2024-12-27 RX ADMIN — ASPIRIN 324 MG: 81 TABLET, CHEWABLE ORAL at 04:31

## 2024-12-27 RX ADMIN — APIXABAN 5 MG: 5 TABLET, FILM COATED ORAL at 10:32

## 2024-12-27 RX ADMIN — LOSARTAN POTASSIUM 50 MG: 50 TABLET, FILM COATED ORAL at 10:32

## 2024-12-27 RX ADMIN — SODIUM CHLORIDE, PRESERVATIVE FREE 10 ML: 5 INJECTION INTRAVENOUS at 10:37

## 2024-12-27 RX ADMIN — DILTIAZEM HYDROCHLORIDE 120 MG: 120 CAPSULE, COATED, EXTENDED RELEASE ORAL at 10:35

## 2024-12-27 RX ADMIN — ATORVASTATIN CALCIUM 10 MG: 20 TABLET, FILM COATED ORAL at 10:32

## 2024-12-27 ASSESSMENT — PAIN - FUNCTIONAL ASSESSMENT: PAIN_FUNCTIONAL_ASSESSMENT: NONE - DENIES PAIN

## 2024-12-27 ASSESSMENT — ENCOUNTER SYMPTOMS
VOMITING: 0
SHORTNESS OF BREATH: 0
NAUSEA: 0
COUGH: 0
ABDOMINAL PAIN: 0

## 2024-12-27 NOTE — ED PROVIDER NOTES
[facility]  Emergency Department Encounter  Emergency Medicine Physician     Pt Name: Chase Bang  MRN: [id number]  Birthdate 1950  Date of evaluation: 12/27/24  PCP:  Nisha Levin MD    CHIEF COMPLAINT       Chief Complaint   Patient presents with    Chest Pain    Arm Pain     Pt. States chest tightness/pain (ache), ongoing since last night before bed, and noticing pain in right arm this AM pt. States taking blood pressure this AM and was 187/80. Pt. Was recently seen yesterday for atrial fibrillation, and was notified to come back with any new symtoms.        HISTORY OF PRESENT ILLNESS  (Location/Symptom, Timing/Onset, Context/Setting, Quality, Duration, Modifying Factors, Severity.)    Chase Bang is a 74 y.o. male who presents with chest discomfort radiates towards the right arm.  Patient states that he noticed it tonight.  States that he got up to go to the bathroom and that is when he noticed that he started having some chest discomfort.  States that he was here yesterday.  Diagnosed with atrial fibrillation.  Was started on diltiazem, Eliquis.  States that has been taking them as prescribed.  Did take the Eliquis last night around 7:30 PM.  He states that he checked his blood pressure after he woke up tonight noticed that it was elevated with a systolic reading into the 180s.  He denies any back pain or abdominal pain.  Denies any jaw pain.  States that he did try to call the cardiologist today to get an appointment, states that his appointment is in February.        PAST MEDICAL / SURGICAL / SOCIAL / FAMILY HISTORY    has a past medical history of Arthritis, BPH (benign prostatic hyperplasia), Caffeine use, Chronic back pain, Chronic lymphocytic leukemia (HCC), COVID-19, Elevated PSA, Erectile dysfunction, History of myasthenia gravis, Hyperlipidemia, Hypertension, Hypoglycemia, Kidney stones, Lightheadedness, Ocular myasthenia gravis (HCC), Prostate cancer (HCC), PVC's

## 2024-12-27 NOTE — PROGRESS NOTES
Patient is being boarded in ER.   Jonathan from ER called down to request maintenance medications be sent up for 0900 doses.  Orders Verified and being tubed to station for boarding medication administration.

## 2024-12-27 NOTE — FLOWSHEET NOTE
Discharge instructions reviewed with the patient and his visitor.  Patient discharged home and will return outpatient on Tuesday fot the stress test.     Tolerated 2 oz pedialyte. Will dc with return precautions, f/u as needed.

## 2024-12-27 NOTE — CONSULTS
[facility]   History and physical  Note                   Date:   12/27/2024  Patient name: Chase Bang  Date of admission:  12/27/2024  4:03 AM  MRN:   [id number]  YOB: 1950  PCP: Nisha Levin MD    74-year-old male with past medical history of hypertension, COVID, chronic back pain, hyperlipidemia ocular myasthenia gravis, prostate cancer, PVC, recently diagnosed with atrial fibrillation on Eliquis presented to the emergency department due to chest discomfort,    States that he got up to go to the bathroom and that is when he noticed that he started having some chest discomfort, patient is on Eliquis and he is compliant with it, he said he checked his blood pressure and it was high in 180s.    On my encounter patient is completely asymptomatic, he said that overnight around 3 AM he was walking to the restroom and he had episode of double chest pain/epigastric pain also radiated to the right arm and he checked his blood pressure it was 187 x 85 mmHg and he decided to come to the emergency department.  He never had this chest pain before.  He does not smoke cigarettes or use drugs.    Troponin trend> 32> 40> 37> 23 and 22    EKG: EKG showed sinus bradycardia with short RP  without any significant ST-T wave changes,no clear delta waive.     Review of Systems   Constitutional:  Negative for chills and fever.   Respiratory:  Negative for cough and shortness of breath.    Cardiovascular:  Positive for chest pain.   Gastrointestinal:  Negative for abdominal pain, nausea and vomiting.   All other systems reviewed and are negative.      Reason  Medications:   Scheduled Meds:   Current Facility-Administered Medications:     acetaminophen (TYLENOL) tablet 1,000 mg, 1,000 mg, Oral, Q6H PRN, Margarito Ann MD    pyRIDostigmine (MESTINON) tablet 60 mg, 60 mg, Oral, 4x daily, Margarito Ann MD    apixaban (ELIQUIS) tablet 5 mg, 5 mg, Oral, BID, Margarito Ann MD    dilTIAZem    Results for orders placed or performed during the hospital encounter of 12/27/24   EKG 12 Lead   Result Value Ref Range    Ventricular Rate 53 BPM    Atrial Rate 53 BPM    P-R Interval 102 ms    QRS Duration 84 ms    Q-T Interval 450 ms    QTc Calculation (Bazett) 422 ms    P Axis 0 degrees    R Axis 50 degrees    T Axis 53 degrees    Narrative    Sinus bradycardia with short ME  Otherwise normal ECG  When compared with ECG of 25-DEC-2024 18:36,  No significant change was found       Echo:    Stress Test:    No results found for this or any previous visit.     No results found for this or any previous visit.       LAST CATH:   No results found for this or any previous visit.    No results found for this or any previous visit.           Assessment / Acute Cardiac Problems:       Patient Active Problem List:     Microscopic hematuria     Calculus of kidney     Hypertrophy of prostate with urinary obstruction and other lower urinary tract symptoms (LUTS)     Chronic low back pain     Chronic lymphocytic leukemia (HCC)     Herpes zoster     Acquired spondylolisthesis     Degeneration of lumbar intervertebral disc     Dermatochalasis of left upper eyelid     Dermatochalasis of right upper eyelid     Essential hypertension     Hyperglycemia     Hyperlipidemia     Lesion of ulnar nerve     Localized, primary osteoarthritis of shoulder region     Lower urinary tract symptoms due to benign prostatic hyperplasia     Lumbosacral spondylosis without myelopathy     Nuclear sclerosis of both eyes     Nuclear senile cataract     Osteoarthritis of knee     Pes anserinus tendinitis     Preglaucoma, unspecified, bilateral     Rupture of right rotator cuff     Tear of lateral meniscus of right knee, current     History of myasthenia gravis     Unilateral primary osteoarthritis, right hip     Unsp athscl native arteries of extremities, right leg (HCC)     Pain in right knee     Pain in right leg     Primary osteoarthritis, left

## 2024-12-27 NOTE — ED TRIAGE NOTES
Mode of arrival (squad #, walk in, police, etc) : Walk-in        Chief complaint(s): Chest pain, right arm pain, hypertension        Arrival Note (brief scenario, treatment PTA, etc).: Pt. Arrived to ER with c/o chest pain, right arm pain, and hypertension. Pt. Was seen yesterday in ER, and went into A-fib. Pt. Was having chest ache last night. Pt. Woke up this AM with right arm pain as well as light headedness. Pt. Took blood pressure at home and was elevated systolic in 180s.         C= \"Have you ever felt that you should Cut down on your drinking?\"  No  A= \"Have people Annoyed you by criticizing your drinking?\"  No  G= \"Have you ever felt bad or Guilty about your drinking?\"  No  E= \"Have you ever had a drink as an Eye-opener first thing in the morning to steady your nerves or to help a hangover?\"  No      Deferred []      Reason for deferring: N/A    *If yes to two or more: probable alcohol abuse.*

## 2024-12-27 NOTE — ED NOTES
Report given to KAROL Castillo from ER.   Report method in person   The following was reviewed with receiving RN:   Current vital signs:  /82   Pulse 51   Temp 97.4 °F (36.3 °C)   Resp 17   Ht 1.778 m (5' 10\")   Wt 77.1 kg (170 lb)   SpO2 94%   BMI 24.39 kg/m²                MEWS Score: 1     Any medication or safety alerts were reviewed. Any pending diagnostics and notifications were also reviewed, as well as any safety concerns or issues, abnormal labs, abnormal imaging, and abnormal assessment findings. Questions were answered.

## 2024-12-27 NOTE — FLOWSHEET NOTE
Patient admitted to room 2038 per wheelchair from the ED.  Patient is adamant that he was told he is being discharged to home and to return on Tuesday for a stress test.  Patient does not want to stay over the weekend and he said he is leaving today.  Writer spoke with Dr. Ann and he will be rounding soon.  Patient states he will wait until 1245 but then he is leaving.

## 2024-12-29 PROBLEM — I48.0 PAROXYSMAL ATRIAL FIBRILLATION (HCC): Status: ACTIVE | Noted: 2024-12-29

## 2024-12-30 ENCOUNTER — CARE COORDINATION (OUTPATIENT)
Dept: CARE COORDINATION | Age: 74
End: 2024-12-30

## 2024-12-30 NOTE — H&P
0809 12/27/24 1221   BP: (!) 140/77 (!) 142/80  (!) 152/81   Pulse: 50 (!) 49 54 55   Resp: 18 17 20 18   Temp:    97.9 °F (36.6 °C)   TempSrc:    Oral   SpO2: 94% 93% 96% 98%   Weight:       Height:           No intake or output data in the 24 hours ending 12/29/24 2316    Physical Exam  Vitals and nursing note reviewed.   Constitutional:       General: He is not in acute distress.     Appearance: Normal appearance. He is not ill-appearing.   Eyes:      Extraocular Movements: Extraocular movements intact.      Conjunctiva/sclera: Conjunctivae normal.   Cardiovascular:      Rate and Rhythm: Regular rhythm. Bradycardia present.      Pulses: Normal pulses.      Heart sounds: Normal heart sounds.   Pulmonary:      Effort: Pulmonary effort is normal. No respiratory distress.      Breath sounds: Normal breath sounds.   Abdominal:      Palpations: Abdomen is soft.      Tenderness: There is no abdominal tenderness.   Neurological:      General: No focal deficit present.      Mental Status: He is alert and oriented to person, place, and time.           DIAGNOSTICS:      Laboratory Testing:    No results found for this or any previous visit (from the past 24 hour(s)).      Imaging/Diagonstics:  XR CHEST (2 VW)    Result Date: 12/27/2024  EXAMINATION: TWO XRAY VIEWS OF THE CHEST 12/27/2024 4:55 am COMPARISON: 12/25/2024 HISTORY: ORDERING SYSTEM PROVIDED HISTORY: left sided chest pain TECHNOLOGIST PROVIDED HISTORY: left sided chest pain Reason for Exam: left sided chest pain Additional signs and symptoms: left sided chest pain Relevant Medical/Surgical History: left sided chest pain FINDINGS: Heart size and pulmonary vasculature are normal.  The lungs are clear and normally expanded.  Surrounding osseous and soft tissue structures are unremarkable.     Normal examination.     XR CHEST PORTABLE    Result Date: 12/25/2024  EXAMINATION: ONE XRAY VIEW OF THE CHEST 12/25/2024 4:54 pm COMPARISON: None. HISTORY: ORDERING SYSTEM  PROVIDED HISTORY: tachycardia TECHNOLOGIST PROVIDED HISTORY: tachycardia Reason for Exam: tachycardia FINDINGS: Lungs are clear. There is no pleural effusion or pneumothorax. Heart is normal in size. Pulmonary vascular markings are normal. No acute osseous abnormalities are seen.     No acute cardiopulmonary process.           Margarito Ann MD  12/29/2024 11:16 PM

## 2024-12-30 NOTE — DISCHARGE SUMMARY
[facility]  Family Medicine      Discharge Summary      NAME:  Chase Bang  :  1950  MRN:  [id number]    Admit date:  2024  Discharge date:  2024    Admitting Physician:  Margarito Ann MD    Primary Diagnosis on Admission:   Present on Admission:   Chest pain   Essential hypertension   Paroxysmal atrial fibrillation (HCC)      Secondary Diagnoses:  does not have any pertinent problems on file.      Admission Condition:  fair     Discharged Condition: good      Hospital Course:   The patient was admitted for the management ofchest pain. Pt was seen in the ER recently for Afib with RVR and discharged home on cardizem and eliquis.  Pt returned to the ER with chest discomfort however stress test can not be done until Monday. Chest discomfort radiates towards the right arm. . Pt has a follow up with cardiology on Tuesday and they agree to do a stress test in the office.     Today on day of discharge pt feels better with no further complaints. Vitals and Labs are at pts baseline.  All consultants involved during this admission are agreeable to d/c.    Consults:  cardiology    Disposition:   home    Instructions to Patient:      Follow up with Nisha Levin MD in  1-2 weeks    Discharge Medications:       Medication List        START taking these medications      nitroGLYCERIN 0.4 MG SL tablet  Commonly known as: Nitrostat  Place 1 tablet under the tongue every 5 minutes as needed for Chest pain up to max of 3 total doses. If no relief after 1 dose, call 911.            CONTINUE taking these medications      acetaminophen 500 MG tablet  Commonly known as: TYLENOL     apixaban 5 MG Tabs tablet  Commonly known as: Eliquis  Take 1 tablet by mouth 2 times daily     Cholecalciferol 50 MCG (2000) Tabs     dilTIAZem 120 MG extended release capsule  Commonly known as: CARDIZEM CD  Take 1 capsule by mouth daily     Glucosamine HCl--250 MG Tabs     Handicap Placard Misc  by Does not

## 2024-12-30 NOTE — CARE COORDINATION
Care Transitions Note    Initial Call - Call within 2 business days of discharge: Yes    Attempted to reach patient for transitions of care follow up. Unable to reach patient.    Outreach Attempts:   HIPAA compliant voicemail left for patient.     Patient: Chase Bang    Patient : 1950   MRN: [id number]    Reason for Admission: cp  Discharge Date: 24  RURS: Readmission Risk Score: 11.3    Last Discharge Facility       Date Complaint Diagnosis Description Type Department Provider    24 Chest Pain; Arm Pain Chest pain, unspecified type ... ED to Hosp-Admission (Discharged) (ADMITTED) [facility] Margarito Ann MD; CHAYO Garcia          # 1 attempt-Attempted initial 24 hour hospital follow up call. Left a Hipaa compliant message with name and call back information. Requested return call to [phone].   Was this an external facility discharge? No    Follow Up Appointment:   Patient does not have a follow up appointment scheduled at time of call.     Future Appointments         Provider Specialty Dept Phone    2024  9:30 AM (Arrive by 9:15 AM) [facility] ECHO 2 Cardiology [phone]    2025 11:20 AM Nisah Levin MD Family Medicine [phone]            Plan for follow-up on next business day. 2nd attempt 24 hr HFU call     Ashely Dumont RN

## 2024-12-31 ENCOUNTER — HOSPITAL ENCOUNTER (OUTPATIENT)
Age: 74
Discharge: HOME | End: 2025-01-02
Attending: STUDENT IN AN ORGANIZED HEALTH CARE EDUCATION/TRAINING PROGRAM
Payer: MEDICARE

## 2024-12-31 ENCOUNTER — CARE COORDINATION (OUTPATIENT)
Dept: CARE COORDINATION | Age: 74
End: 2024-12-31

## 2024-12-31 DIAGNOSIS — R07.9 CHEST PAIN, UNSPECIFIED TYPE: ICD-10-CM

## 2024-12-31 LAB
ECHO AO ROOT DIAM: 4 CM
ECHO AV AREA PEAK VELOCITY: 1.7 CM2
ECHO AV AREA VTI: 2.5 CM2
ECHO AV MEAN GRADIENT: 7 MMHG
ECHO AV MEAN VELOCITY: 1.2 M/S
ECHO AV PEAK GRADIENT: 20 MMHG
ECHO AV PEAK VELOCITY: 2.2 M/S
ECHO AV VELOCITY RATIO: 0.59
ECHO AV VTI: 35.4 CM
ECHO EST RA PRESSURE: 8 MMHG
ECHO LA AREA 2C: 21.2 CM2
ECHO LA AREA 4C: 21.2 CM2
ECHO LA DIAMETER: 3.2 CM
ECHO LA MAJOR AXIS: 6.6 CM
ECHO LA MINOR AXIS: 6.1 CM
ECHO LA TO AORTIC ROOT RATIO: 0.8
ECHO LA VOL BP: 59 ML (ref 18–58)
ECHO LA VOL MOD A2C: 58 ML (ref 18–58)
ECHO LA VOL MOD A4C: 56 ML (ref 18–58)
ECHO LV E' LATERAL VELOCITY: 12 CM/S
ECHO LV E' SEPTAL VELOCITY: 9.77 CM/S
ECHO LV EF PHYSICIAN: 65 %
ECHO LV FRACTIONAL SHORTENING: 33 % (ref 28–44)
ECHO LV INTERNAL DIMENSION DIASTOLIC: 5.2 CM (ref 4.2–5.9)
ECHO LV INTERNAL DIMENSION SYSTOLIC: 3.5 CM
ECHO LV IVSD: 1 CM (ref 0.6–1)
ECHO LV MASS 2D: 194.2 G (ref 88–224)
ECHO LV POSTERIOR WALL DIASTOLIC: 1 CM (ref 0.6–1)
ECHO LV RELATIVE WALL THICKNESS RATIO: 0.38
ECHO LVOT AREA: 2.8 CM2
ECHO LVOT AV VTI INDEX: 0.87
ECHO LVOT DIAM: 1.9 CM
ECHO LVOT MEAN GRADIENT: 4 MMHG
ECHO LVOT PEAK GRADIENT: 7 MMHG
ECHO LVOT PEAK VELOCITY: 1.3 M/S
ECHO LVOT SV: 87 ML
ECHO LVOT VTI: 30.7 CM
ECHO MV A VELOCITY: 0.8 M/S
ECHO MV E DECELERATION TIME (DT): 207 MS
ECHO MV E VELOCITY: 0.57 M/S
ECHO MV E/A RATIO: 0.71
ECHO MV E/E' LATERAL: 4.75
ECHO MV E/E' RATIO (AVERAGED): 5.29
ECHO MV E/E' SEPTAL: 5.83
ECHO RA AREA 4C: 17.4 CM2
ECHO RA VOLUME: 51 ML
ECHO RIGHT VENTRICULAR SYSTOLIC PRESSURE (RVSP): 22 MMHG
ECHO RV TAPSE: 3 CM (ref 1.7–?)
ECHO TV REGURGITANT MAX VELOCITY: 1.9 M/S
ECHO TV REGURGITANT PEAK GRADIENT: 14 MMHG
EKG ATRIAL RATE: 53 BPM
EKG P AXIS: 0 DEGREES
EKG P-R INTERVAL: 102 MS
EKG Q-T INTERVAL: 450 MS
EKG QRS DURATION: 84 MS
EKG QTC CALCULATION (BAZETT): 422 MS
EKG R AXIS: 50 DEGREES
EKG T AXIS: 53 DEGREES
EKG VENTRICULAR RATE: 53 BPM

## 2024-12-31 PROCEDURE — 93306 TTE W/DOPPLER COMPLETE: CPT

## 2024-12-31 PROCEDURE — 93010 ELECTROCARDIOGRAM REPORT: CPT | Performed by: INTERNAL MEDICINE

## 2024-12-31 NOTE — CARE COORDINATION
Care Transitions Note    Initial Call - Call within 2 business days of discharge: Yes    Patient Current Location:  Home: [facility] [address]   [place] [facility] [place] [facility] [address]    Care Transition Nurse contacted the patient by telephone to perform post hospital discharge assessment, verified name and  as identifiers. Provided introduction to self, and explanation of the Care Transition Nurse role.     Patient: Chase Bang    Patient : 1950   MRN: [id number]    Reason for Admission: cp  Discharge Date: 24  RURS: Readmission Risk Score: 11.3      Last Discharge Facility       Date Complaint Diagnosis Description Type Department Provider    24 Chest Pain; Arm Pain Chest pain, unspecified type ... ED to Hosp-Admission (Discharged) (ADMITTED) [facility] Margarito Ann MD; MK Garcia.            Was this an external facility discharge? No    Additional needs identified to be addressed with provider                 Method of communication with provider:    .    Patients top risk factors for readmission: functional physical ability and medication management    Interventions to address risk factors:   Review of patient management of conditions/medications:      Care Summary Note: Writer spoke to patient, he stated he is doing well, reviewed discharge instructions and medications, patient denied any c/o fever, chills, n/v/d sob or chest pain at time of call, patient refused any further care transitions, he will follow with his pcp and f/u with her for any needs, CTN episode resolved//JU    Care Transition Nurse reviewed discharge instructions, medical action plan, and red flags with patient. The patient was given an opportunity to ask questions; all questions answered at this time.. The patient verbalized understanding.   Were discharge instructions available to patient? Yes.   Reviewed appropriate site of care based on symptoms and resources available to patient including: PCP  Specialist  Urgent care

## 2025-01-03 ENCOUNTER — HOSPITAL ENCOUNTER (OUTPATIENT)
Age: 75
Setting detail: SPECIMEN
Discharge: HOME | End: 2025-01-03

## 2025-01-03 DIAGNOSIS — Z85.46 HISTORY OF PROSTATE CANCER: ICD-10-CM

## 2025-01-04 NOTE — PROGRESS NOTES
Physician Progress Note      PATIENT:               EDDIE MCNALLY  CSN #:                  [id number]  :                       1950  ADMIT DATE:       2024 4:03 AM  DISCH DATE:        2024 1:00 PM  RESPONDING  PROVIDER #:        Margarito Ann MD          QUERY TEXT:    Pt admitted with chest pain. If possible, please document in progress notes   and discharge summary if you are evaluating and/or treating any of the   following:    The medical record reflects the following:  Risk Factors: chest pain  Clinical Indicators: presented with chest pain, noted to have recent admission   for paroxysmal A-fib; troponin trend 32, 40, 37, 23, 22, EKG showed SB with   short RP without any significant ST-T wave changes; stress test to be done as   outpatient  Treatment: Labs, imaging, monitoring, Cardiology consult  Options provided:  -- Chest pain due to PAF  -- Chest pain due to, Please document cause of chest pain.  -- Other - I will add my own diagnosis  -- Disagree - Not applicable / Not valid  -- Disagree - Clinically unable to determine / Unknown  -- Refer to Clinical Documentation Reviewer    PROVIDER RESPONSE TEXT:    This patient has chest pain due to PAF.    Query created by: Maribel Ivy on 2025 12:53 PM      Electronically signed by:  Margarito Ann MD 1/3/2025 7:35 PM

## 2025-01-05 LAB — PSA SERPL DL<=0.01 NG/ML-MCNC: 0.02 NG/ML (ref 0–4)

## 2025-01-14 NOTE — PROGRESS NOTES
Physician Progress Note      PATIENT:               EDDIE MCNALLY  CSN #:                  [id number]  :                       1950  ADMIT DATE:       2024 4:03 AM  DISCH DATE:        2024 1:00 PM  RESPONDING  PROVIDER #:        Margarito Ann MD          QUERY TEXT:    Patient admitted with chest pain, noted to have paroxysmal atrial fibrillation   and is maintained on Eliquis. If possible, please document in progress notes   and discharge summary if you are evaluating and/or treating any of the   following:?  ?  The medical record reflects the following:  Risk Factors: chest pain, PAF, HTN, 74 years old  Clinical Indicators: presented with chest pain due to PAF, noted to have been   placed on Eliquis  Treatment: Eliquis, labs, imaging, monitoring, Cardiology consult  Options provided:  -- Secondary hypercoagulable state in a patient with atrial fibrillation  -- Other - I will add my own diagnosis  -- Disagree - Not applicable / Not valid  -- Disagree - Clinically unable to determine / Unknown  -- Refer to Clinical Documentation Reviewer    PROVIDER RESPONSE TEXT:    This patient has secondary hypercoagulable state in a patient with atrial   fibrillation.    Query created by: Maribel Ivy on 2025 1:35 PM      Electronically signed by:  Margarito Ann MD 2025 10:36 PM

## 2025-03-07 ENCOUNTER — TRANSCRIBE ORDERS (OUTPATIENT)
Dept: ADMINISTRATIVE | Age: 75
End: 2025-03-07

## 2025-03-07 DIAGNOSIS — I48.91 ATRIAL FIBRILLATION, UNSPECIFIED TYPE (HCC): ICD-10-CM

## 2025-03-07 DIAGNOSIS — I10 HYPERTENSION, UNSPECIFIED TYPE: Primary | ICD-10-CM

## 2025-03-07 DIAGNOSIS — R07.89 CHEST PRESSURE: ICD-10-CM

## 2025-03-13 ENCOUNTER — HOSPITAL ENCOUNTER (EMERGENCY)
Age: 75
Discharge: HOME | End: 2025-03-13
Attending: EMERGENCY MEDICINE
Payer: MEDICARE

## 2025-03-13 VITALS
DIASTOLIC BLOOD PRESSURE: 58 MMHG | WEIGHT: 182.98 LBS | HEIGHT: 70 IN | HEART RATE: 101 BPM | TEMPERATURE: 97.9 F | OXYGEN SATURATION: 93 % | RESPIRATION RATE: 16 BRPM | BODY MASS INDEX: 26.2 KG/M2 | SYSTOLIC BLOOD PRESSURE: 94 MMHG

## 2025-03-13 DIAGNOSIS — I48.91 ATRIAL FIBRILLATION WITH RAPID VENTRICULAR RESPONSE (HCC): Primary | ICD-10-CM

## 2025-03-13 PROCEDURE — 96375 TX/PRO/DX INJ NEW DRUG ADDON: CPT

## 2025-03-13 PROCEDURE — 6360000002 HC RX W HCPCS: Performed by: STUDENT IN AN ORGANIZED HEALTH CARE EDUCATION/TRAINING PROGRAM

## 2025-03-13 PROCEDURE — 99284 EMERGENCY DEPT VISIT MOD MDM: CPT

## 2025-03-13 PROCEDURE — 96365 THER/PROPH/DIAG IV INF INIT: CPT

## 2025-03-13 PROCEDURE — 96361 HYDRATE IV INFUSION ADD-ON: CPT

## 2025-03-13 PROCEDURE — 93005 ELECTROCARDIOGRAM TRACING: CPT | Performed by: STUDENT IN AN ORGANIZED HEALTH CARE EDUCATION/TRAINING PROGRAM

## 2025-03-13 PROCEDURE — 2500000003 HC RX 250 WO HCPCS: Performed by: STUDENT IN AN ORGANIZED HEALTH CARE EDUCATION/TRAINING PROGRAM

## 2025-03-13 PROCEDURE — 2580000003 HC RX 258: Performed by: STUDENT IN AN ORGANIZED HEALTH CARE EDUCATION/TRAINING PROGRAM

## 2025-03-13 RX ORDER — 0.9 % SODIUM CHLORIDE 0.9 %
1000 INTRAVENOUS SOLUTION INTRAVENOUS ONCE
Status: COMPLETED | OUTPATIENT
Start: 2025-03-13 | End: 2025-03-13

## 2025-03-13 RX ORDER — DILTIAZEM HYDROCHLORIDE 5 MG/ML
0.25 INJECTION INTRAVENOUS ONCE
Status: COMPLETED | OUTPATIENT
Start: 2025-03-13 | End: 2025-03-13

## 2025-03-13 RX ORDER — MAGNESIUM SULFATE IN WATER 40 MG/ML
2000 INJECTION, SOLUTION INTRAVENOUS ONCE
Status: COMPLETED | OUTPATIENT
Start: 2025-03-13 | End: 2025-03-13

## 2025-03-13 RX ADMIN — SODIUM CHLORIDE 1000 ML: 0.9 INJECTION, SOLUTION INTRAVENOUS at 09:51

## 2025-03-13 RX ADMIN — MAGNESIUM SULFATE HEPTAHYDRATE 2000 MG: 40 INJECTION, SOLUTION INTRAVENOUS at 09:50

## 2025-03-13 RX ADMIN — DILTIAZEM HYDROCHLORIDE 21 MG: 5 INJECTION, SOLUTION INTRAVENOUS at 09:27

## 2025-03-13 ASSESSMENT — ENCOUNTER SYMPTOMS
ABDOMINAL PAIN: 0
SHORTNESS OF BREATH: 0
COUGH: 0
VOMITING: 0
NAUSEA: 0

## 2025-03-13 ASSESSMENT — PAIN - FUNCTIONAL ASSESSMENT: PAIN_FUNCTIONAL_ASSESSMENT: NONE - DENIES PAIN

## 2025-03-13 NOTE — DISCHARGE INSTRUCTIONS
Be sure to follow-up with your primary care provider as well as your cardiologist.  Return to emergency department for any acutely worsening/any symptoms or any other acute concerns.

## 2025-03-13 NOTE — ED PROVIDER NOTES
[facility]  Emergency Department  Faculty Attestation     I performed a history and physical examination of the patient and discussed management with the resident. I reviewed the resident’s note and agree with the documented findings and plan of care. Any areas of disagreement are noted on the chart. I was personally present for the key portions of any procedures. I have documented in the chart those procedures where I was not present during the key portions. I have reviewed the emergency nurses triage note. I agree with the chief complaint, past medical history, past surgical history, allergies, medications, social and family history as documented unless otherwise noted below.    For Physician Assistant/ Nurse Practitioner cases/documentation I have personally evaluated this patient and have completed at least one if not all key elements of the E/M (history, physical exam, and MDM). Additional findings are as noted.    Preliminary note started at 9:19 AM EDT    Primary Care Physician:  Nisha Levin MD    Screenings:  [unfilled]    CHIEF COMPLAINT       Chief Complaint   Patient presents with    AFIB RVR       RECENT VITALS:   /70   Pulse (!) 126   Temp 97.9 °F (36.6 °C)   Resp 20   SpO2 96%     LABS:  Labs Reviewed - No data to display    Radiology  No orders to display       CRITICAL CARE: There was a high probability of clinically significant/life threatening deterioration in this patient's condition which required my urgent intervention.  Total critical care time was none minutes.  This excludes any time for separately reportable procedures.     EKG:  EKG Interpretation    Interpreted by me    Rhythm: Atrial fibrillation  Rate: Tachycardia  Axis: normal  Ectopy: none  Conduction: normal  ST Segments: Diffuse ST depressions diffusely, elevation aVR  T Waves: Inversions inferolaterally  Q Waves: none    Clinical Impression: Rapid atrial fibrillation with

## 2025-03-13 NOTE — ED PROVIDER NOTES
History    Marital status: Single     Spouse name: Not on file    Number of children: Not on file    Years of education: Not on file    Highest education level: Not on file   Occupational History    Not on file   Tobacco Use    Smoking status: Former     Current packs/day: 0.50     Average packs/day: 0.5 packs/day for 39.2 years (19.6 ttl pk-yrs)     Types: Cigarettes, Pipe, Cigars     Start date: 1974     Quit date: 1993     Passive exposure: Never    Smokeless tobacco: Never    Tobacco comments:     Smoked a pipe about 1 year in his 20's, also smoked cigarettes in college, smoked x1 cigar in 1992 (quit smoking in 1992).   Vaping Use    Vaping status: Never Used   Substance and Sexual Activity    Alcohol use: Not Currently     Alcohol/week: 3.0 standard drinks of alcohol     Types: 3 Glasses of wine per week     Comment: 3 glasses wine weekly    Drug use: Never    Sexual activity: Not Currently   Other Topics Concern    Not on file   Social History Narrative    Not on file     Social Drivers of Health     Financial Resource Strain: Low Risk  (2/29/2024)    Overall Financial Resource Strain (CARDIA)     Difficulty of Paying Living Expenses: Not hard at all   Food Insecurity: No Food Insecurity (10/22/2024)    Hunger Vital Sign     Worried About Running Out of Food in the Last Year: Never true     Ran Out of Food in the Last Year: Never true   Transportation Needs: No Transportation Needs (10/22/2024)    PRAPARE - Transportation     Lack of Transportation (Medical): No     Lack of Transportation (Non-Medical): No   Physical Activity: Sufficiently Active (8/26/2024)    Exercise Vital Sign     Days of Exercise per Week: 4 days     Minutes of Exercise per Session: 60 min   Stress: No Stress Concern Present (11/8/2022)    [facility]    [employer] [place] [employer] - Occupational Stress Questionnaire     Feeling of Stress : Only a little   Social    Cardiovascular:      Rate and Rhythm: Regular rhythm. Tachycardia present.      Heart sounds: No murmur heard.     No friction rub. No gallop.   Pulmonary:      Effort: Pulmonary effort is normal. No respiratory distress.      Breath sounds: Normal breath sounds. No stridor. No wheezing, rhonchi or rales.   Abdominal:      General: There is no distension.      Tenderness: There is no abdominal tenderness. There is no guarding.   Skin:     General: Skin is warm and dry.      Capillary Refill: Capillary refill takes less than 2 seconds.   Neurological:      Mental Status: He is alert.           DDX/DIAGNOSTIC RESULTS / EMERGENCY DEPARTMENT COURSE / MDM     Medical Decision Making  Amount and/or Complexity of Data Reviewed  ECG/medicine tests: ordered.    Risk  Prescription drug management.        EKG  EKG Interpretation    Interpreted by me    Rhythm: A-fib RVR  Rate: 152 bpm  Axis: normal  Ectopy: none  Conduction: Nonspecific intraventricular block  ST Segments: ST segment depression to inferolateral leads  T Waves: no acute change  Q Waves: none    Clinical Impression: A-fib RVR with heart rate 152 bpm, ST segment depressions to the inferolateral leads similarly seen on prior EKG in setting of A-fib RVR    All EKG's are interpreted by the Emergency Department Physician who either signs or Co-signs this chart in the absence of a cardiologist.    EMERGENCY DEPARTMENT COURSE:  Patient seen and examined.  Patient is significantly tachycardic with irregularly irregular rhythm, known history of A-fib with RVR in the past.  Vital signs otherwise stable.  Patient is overall well-appearing, not in acute distress or obvious discomfort.  EKG showing A-fib RVR with heart rate 152 bpm and inferolateral ST segment depressions are similarly seen on prior EKG in the setting of A-fib RVR.  Will give diltiazem 0.25 mg/kg IV, magnesium sulfate 2 g IV.    ED Course as of 03/13/25 1218   Thu Mar 13, 2025   0945 Heart rate improving,

## 2025-03-13 NOTE — ED NOTES
Pt to ED for c/o: AFIB with RVR.   Pt states recent dx back in December and was put on eliquis and cardizem. Pt states taking medications as prescribed. Pt states he was woke up this morning due to the onset of the Afib.

## 2025-03-14 LAB
EKG ATRIAL RATE: 182 BPM
EKG Q-T INTERVAL: 282 MS
EKG QRS DURATION: 162 MS
EKG QTC CALCULATION (BAZETT): 448 MS
EKG R AXIS: 48 DEGREES
EKG T AXIS: 34 DEGREES
EKG VENTRICULAR RATE: 152 BPM

## 2025-03-20 ENCOUNTER — HOSPITAL ENCOUNTER (OUTPATIENT)
Dept: NUCLEAR MEDICINE | Age: 75
Discharge: HOME | End: 2025-03-22
Attending: INTERNAL MEDICINE
Payer: MEDICARE

## 2025-03-20 ENCOUNTER — HOSPITAL ENCOUNTER (OUTPATIENT)
Age: 75
Discharge: HOME | End: 2025-03-22
Attending: INTERNAL MEDICINE
Payer: MEDICARE

## 2025-03-20 DIAGNOSIS — R07.89 CHEST PRESSURE: ICD-10-CM

## 2025-03-20 DIAGNOSIS — I48.91 ATRIAL FIBRILLATION, UNSPECIFIED TYPE (HCC): ICD-10-CM

## 2025-03-20 DIAGNOSIS — I10 HYPERTENSION, UNSPECIFIED TYPE: ICD-10-CM

## 2025-03-20 LAB
NUC STRESS EJECTION FRACTION: 59 %
STRESS BASELINE DIAS BP: 84 MMHG
STRESS BASELINE HR: 53 BPM
STRESS BASELINE SYS BP: 134 MMHG
STRESS ESTIMATED WORKLOAD: 10.3 METS
STRESS EXERCISE DUR MIN: 17 MIN
STRESS EXERCISE DUR SEC: 40 SEC
STRESS PEAK DIAS BP: 81 MMHG
STRESS PEAK SYS BP: 217 MMHG
STRESS PERCENT HR ACHIEVED: 86 %
STRESS POST PEAK HR: 125 BPM
STRESS RATE PRESSURE PRODUCT: NORMAL BPM*MMHG
STRESS STAGE RECOVERY 1 BP: NORMAL MMHG
STRESS STAGE RECOVERY 1 DURATION: 1 MIN:SEC
STRESS STAGE RECOVERY 1 HR: 99 BPM
STRESS STAGE RECOVERY 2 BP: NORMAL MMHG
STRESS STAGE RECOVERY 2 DURATION: 4 MIN:SEC
STRESS STAGE RECOVERY 2 HR: 73 BPM
STRESS STAGE RECOVERY 3 BP: NORMAL MMHG
STRESS STAGE RECOVERY 3 DURATION: 7 MIN:SEC
STRESS STAGE RECOVERY 3 HR: 68 BPM
STRESS TARGET HR: 146 BPM
TID: 0.98

## 2025-03-20 PROCEDURE — 93017 CV STRESS TEST TRACING ONLY: CPT

## 2025-03-20 PROCEDURE — 3430000000 HC RX DIAGNOSTIC RADIOPHARMACEUTICAL: Performed by: INTERNAL MEDICINE

## 2025-03-20 PROCEDURE — 2500000003 HC RX 250 WO HCPCS: Performed by: INTERNAL MEDICINE

## 2025-03-20 PROCEDURE — A9500 TC99M SESTAMIBI: HCPCS | Performed by: INTERNAL MEDICINE

## 2025-03-20 PROCEDURE — 78452 HT MUSCLE IMAGE SPECT MULT: CPT

## 2025-03-20 RX ORDER — 0.9 % SODIUM CHLORIDE 0.9 %
500 INTRAVENOUS SOLUTION INTRAVENOUS
Status: DISCONTINUED | OUTPATIENT
Start: 2025-03-20 | End: 2025-03-23 | Stop reason: HOSPADM

## 2025-03-20 RX ORDER — SODIUM CHLORIDE 0.9 % (FLUSH) 0.9 %
10 SYRINGE (ML) INJECTION PRN
Status: DISCONTINUED | OUTPATIENT
Start: 2025-03-20 | End: 2025-03-23 | Stop reason: HOSPADM

## 2025-03-20 RX ORDER — TETRAKIS(2-METHOXYISOBUTYLISOCYANIDE)COPPER(I) TETRAFLUOROBORATE 1 MG/ML
10 INJECTION, POWDER, LYOPHILIZED, FOR SOLUTION INTRAVENOUS
Status: COMPLETED | OUTPATIENT
Start: 2025-03-20 | End: 2025-03-20

## 2025-03-20 RX ORDER — METOPROLOL TARTRATE 1 MG/ML
5 INJECTION, SOLUTION INTRAVENOUS
Status: DISCONTINUED | OUTPATIENT
Start: 2025-03-20 | End: 2025-03-23 | Stop reason: HOSPADM

## 2025-03-20 RX ORDER — NITROGLYCERIN 0.4 MG/1
0.4 TABLET SUBLINGUAL
Status: DISCONTINUED | OUTPATIENT
Start: 2025-03-20 | End: 2025-03-23 | Stop reason: HOSPADM

## 2025-03-20 RX ORDER — TETRAKIS(2-METHOXYISOBUTYLISOCYANIDE)COPPER(I) TETRAFLUOROBORATE 1 MG/ML
30 INJECTION, POWDER, LYOPHILIZED, FOR SOLUTION INTRAVENOUS
Status: COMPLETED | OUTPATIENT
Start: 2025-03-20 | End: 2025-03-20

## 2025-03-20 RX ORDER — ATROPINE SULFATE 0.1 MG/ML
1 INJECTION INTRAVENOUS
Status: DISCONTINUED | OUTPATIENT
Start: 2025-03-20 | End: 2025-03-23 | Stop reason: HOSPADM

## 2025-03-20 RX ADMIN — Medication 10.5 MILLICURIE: at 07:19

## 2025-03-20 RX ADMIN — SODIUM CHLORIDE, PRESERVATIVE FREE 10 ML: 5 INJECTION INTRAVENOUS at 07:19

## 2025-03-20 RX ADMIN — Medication 33.1 MILLICURIE: at 09:06

## 2025-09-03 ENCOUNTER — HOSPITAL ENCOUNTER (OUTPATIENT)
Dept: LAB | Age: 75
Discharge: HOME | End: 2025-09-03
Payer: MEDICARE

## 2025-09-03 DIAGNOSIS — Z85.46 HISTORY OF PROSTATE CANCER: ICD-10-CM

## 2025-09-03 DIAGNOSIS — E78.2 MIXED HYPERLIPIDEMIA: ICD-10-CM

## 2025-09-03 DIAGNOSIS — C91.10 CHRONIC LYMPHOCYTIC LEUKEMIA (HCC): ICD-10-CM

## 2025-09-03 LAB
ALBUMIN SERPL-MCNC: 4.3 G/DL (ref 3.5–5.2)
ALP SERPL-CCNC: 51 U/L (ref 40–129)
ALT SERPL-CCNC: 21 U/L (ref 10–50)
ANION GAP SERPL CALCULATED.3IONS-SCNC: 10 MMOL/L (ref 9–16)
AST SERPL-CCNC: 21 U/L (ref 10–50)
BASOPHILS # BLD: 0 K/UL (ref 0–0.2)
BASOPHILS NFR BLD: 0 % (ref 0–2)
BILIRUB SERPL-MCNC: 0.9 MG/DL (ref 0–1.2)
BUN SERPL-MCNC: 17 MG/DL (ref 8–23)
CALCIUM SERPL-MCNC: 9.1 MG/DL (ref 8.6–10.4)
CHLORIDE SERPL-SCNC: 105 MMOL/L (ref 98–107)
CHOLEST SERPL-MCNC: 139 MG/DL (ref 0–199)
CHOLESTEROL/HDL RATIO: 3.2
CO2 SERPL-SCNC: 26 MMOL/L (ref 20–31)
CREAT SERPL-MCNC: 0.7 MG/DL (ref 0.7–1.2)
EOSINOPHIL # BLD: 0.24 K/UL (ref 0–0.4)
EOSINOPHILS RELATIVE PERCENT: 1 % (ref 0–4)
ERYTHROCYTE [DISTWIDTH] IN BLOOD BY AUTOMATED COUNT: 14.6 % (ref 11.5–14.9)
GFR, ESTIMATED: >90 ML/MIN/1.73M2
GLUCOSE SERPL-MCNC: 100 MG/DL (ref 74–99)
HCT VFR BLD AUTO: 41.9 % (ref 41–53)
HDLC SERPL-MCNC: 43 MG/DL
HGB BLD-MCNC: 13.4 G/DL (ref 13.5–17.5)
IMM GRANULOCYTES # BLD AUTO: 0 K/UL (ref 0–0.3)
IMM GRANULOCYTES NFR BLD: 0 %
LDLC SERPL CALC-MCNC: 65 MG/DL (ref 0–100)
LYMPHOCYTES NFR BLD: 21.21 K/UL (ref 1–4.8)
LYMPHOCYTES RELATIVE PERCENT: 88 % (ref 24–44)
MCH RBC QN AUTO: 30.7 PG (ref 26–34)
MCHC RBC AUTO-ENTMCNC: 32 G/DL (ref 31–37)
MCV RBC AUTO: 95.9 FL (ref 80–100)
MONOCYTES NFR BLD: 0.24 K/UL (ref 0.1–1.3)
MONOCYTES NFR BLD: 1 % (ref 1–7)
MORPHOLOGY: ABNORMAL
NEUTROPHILS NFR BLD: 10 % (ref 36–66)
NEUTS SEG NFR BLD: 2.41 K/UL (ref 1.3–9.1)
NRBC BLD-RTO: 0 PER 100 WBC
PLATELET # BLD AUTO: 172 K/UL (ref 150–450)
PMV BLD AUTO: 9.9 FL (ref 8–13.5)
POTASSIUM SERPL-SCNC: 4.5 MMOL/L (ref 3.7–5.3)
PROT SERPL-MCNC: 6.4 G/DL (ref 6.6–8.7)
RBC # BLD AUTO: 4.37 M/UL (ref 4.21–5.77)
SODIUM SERPL-SCNC: 141 MMOL/L (ref 136–145)
TRIGL SERPL-MCNC: 157 MG/DL (ref 0–149)
WBC OTHER # BLD: 24.1 K/UL (ref 3.5–11)

## 2025-09-03 PROCEDURE — 85025 COMPLETE CBC W/AUTO DIFF WBC: CPT

## 2025-09-03 PROCEDURE — 84153 ASSAY OF PSA TOTAL: CPT

## 2025-09-03 PROCEDURE — 36415 COLL VENOUS BLD VENIPUNCTURE: CPT

## 2025-09-03 PROCEDURE — 80053 COMPREHEN METABOLIC PANEL: CPT

## 2025-09-03 PROCEDURE — 80061 LIPID PANEL: CPT

## 2025-09-05 LAB — PSA SERPL DL<=0.01 NG/ML-MCNC: 0.02 NG/ML (ref 0–4)

## (undated) PROCEDURE — 02HV33Z INSERTION OF INFUSION DEVICE INTO SUPERIOR VENA CAVA, PERCUTANEOUS APPROACH: ICD-10-PCS

## (undated) DEVICE — APPLICATOR MEDICATED 26 CC SOLUTION HI LT ORNG CHLORAPREP

## (undated) DEVICE — YANKAUER,BULB TIP,W/O VENT,RIGID,STERILE: Brand: MEDLINE

## (undated) DEVICE — TISSUE RETRIEVAL SYSTEM: Brand: INZII RETRIEVAL SYSTEM

## (undated) DEVICE — PAD PT POS 36 IN SURGYPAD DISP

## (undated) DEVICE — NEEDLE BX ASPIR SPNL TIPCM MRK AND NDL STP 22GAX20CM

## (undated) DEVICE — CONTAINER,SPECIMEN,4OZ,OR STRL: Brand: MEDLINE

## (undated) DEVICE — STRAP,CATHETER,ELASTIC,HOOK&LOOP: Brand: MEDLINE

## (undated) DEVICE — SUTURE MCRYL SZ 3-0 L27IN ABSRB VLT L17MM RB-1 1/2 CIR Y305H

## (undated) DEVICE — COUNTER NDL 10 COUNT HLD 20 FOAM BLK SGL MAG

## (undated) DEVICE — GLOVE ORANGE PI 7 1/2   MSG9075

## (undated) DEVICE — SUTURE V-LOC 180 SZ 3-0 L6IN ABSRB GRN V-20 L26MM 1/2 CIR VLOCL0604

## (undated) DEVICE — BLANKET WRM W29.9XL79.1IN UP BODY FORC AIR MISTRAL-AIR

## (undated) DEVICE — 3M™ IOBAN™ 2 ANTIMICROBIAL INCISE DRAPE 6650EZ: Brand: IOBAN™ 2

## (undated) DEVICE — MARKER,SKIN,WI/RULER AND LABELS: Brand: MEDLINE

## (undated) DEVICE — TIP COVER ACCESSORY

## (undated) DEVICE — SOLUTION ANTIFOG VIS SYS CLEARIFY LAPSCP

## (undated) DEVICE — Device

## (undated) DEVICE — TRI-LUMEN FILTERED TUBE SET WITH ACTIVATED CHARCOAL FILTER: Brand: AIRSEAL

## (undated) DEVICE — 3M™ STERI-STRIP™ COMPOUND BENZOIN TINCTURE 40 BAGS/CARTON 4 CARTONS/CASE C1544: Brand: 3M™ STERI-STRIP™

## (undated) DEVICE — INSUFFLATION NEEDLE TO ESTABLISH PNEUMOPERITONEUM.: Brand: INSUFFLATION NEEDLE

## (undated) DEVICE — SOLUTION IRRIG 1000ML STRL H2O USP PLAS POUR BTL

## (undated) DEVICE — GLOVE ORANGE PI 7   MSG9070

## (undated) DEVICE — SYRINGE MED 10ML LUERLOCK TIP W/O SFTY DISP

## (undated) DEVICE — METER,URINE,400ML,DRAIN BAG,L/F,LL: Brand: MEDLINE

## (undated) DEVICE — SHEET,DRAPE,53X77,STERILE: Brand: MEDLINE

## (undated) DEVICE — APPLICATOR LAP 45CM FLX 2 VISTASEAL

## (undated) DEVICE — APPLIER SUT CLP FOR 2-0 3-0 4-0 VCRL ABSRB SUT

## (undated) DEVICE — ADHESIVE SKIN CLOSURE TOP 36 CC HI VISC DERMBND MINI

## (undated) DEVICE — BLADELESS OBTURATOR: Brand: WECK VISTA

## (undated) DEVICE — GLOVE ORTHO 7 1/2   MSG9475

## (undated) DEVICE — SPONGE LAP W18XL18IN WHT COT 4 PLY FLD STRUNG RADPQ DISP ST

## (undated) DEVICE — AIRSEAL 12 MM ACCESS PORT AND PALM GRIP OBTURATOR WITH BLADELESS OPTICAL TIP, 120 MM LENGTH: Brand: AIRSEAL

## (undated) DEVICE — ARM DRAPE

## (undated) DEVICE — SEALANT TISS 10 CC FIBRIN VISTASEAL

## (undated) DEVICE — INTENDED FOR TISSUE SEPARATION, AND OTHER PROCEDURES THAT REQUIRE A SHARP SURGICAL BLADE TO PUNCTURE OR CUT.: Brand: BARD-PARKER ® CARBON RIB-BACK BLADES

## (undated) DEVICE — ELECTRO LUBE IS A SINGLE PATIENT USE DEVICE THAT IS INTENDED TO BE USED ON ELECTROSURGICAL ELECTRODES TO REDUCE STICKING.: Brand: KEY SURGICAL ELECTRO LUBE

## (undated) DEVICE — SYRINGE,PISTON,IRRIGATION,60ML,STERILE: Brand: MEDLINE

## (undated) DEVICE — GOWN,ECLIPSE,NONRNF,XL,ST,30/CS: Brand: MEDLINE

## (undated) DEVICE — SOLUTION IV 1000ML 0.9% SOD CHL PH 5 INJ USP VIAFLX PLAS

## (undated) DEVICE — TROCAR: Brand: KII FIOS FIRST ENTRY

## (undated) DEVICE — SYRINGE MED 30ML STD CLR PLAS LUERLOCK TIP N CTRL DISP

## (undated) DEVICE — GOWN,AURORA,NONREINFORCED,LARGE: Brand: MEDLINE

## (undated) DEVICE — COVER,MAYO STAND,XL,STERILE: Brand: MEDLINE

## (undated) DEVICE — ST CHARLES GEN LAPAROSCOPY PK: Brand: MEDLINE INDUSTRIES, INC.

## (undated) DEVICE — SUTURE PDS II SZ 1 L36IN ABSRB VLT L36MM CT-1 1/2 CIR Z347H

## (undated) DEVICE — GEL US 20GM NONIRRITATING OVERWRAPPED FILE PCH TRNSMIT

## (undated) DEVICE — CATHETER URETH 18FR BLLN 30CC 2 W F INF CTRL BARDX

## (undated) DEVICE — MONOPTY® DISPOSABLE CORE BIOPSY INSTRUMENT, 22MM PENETRATION DEPTH, 18G X 20CM: Brand: MONOPTY

## (undated) DEVICE — TUBING, SUCTION, 3/16" X 10', STRAIGHT: Brand: MEDLINE

## (undated) DEVICE — SINGLE PORT MANIFOLD: Brand: NEPTUNE 2

## (undated) DEVICE — GOWN,SIRUS,NONRNF,SETINSLV,XL,20/CS: Brand: MEDLINE

## (undated) DEVICE — SUTURE MCRYL + SZ 4-0 L27IN ABSRB UD L19MM PS-2 3/8 CIR MCP426H

## (undated) DEVICE — SUTURE ABSORB MONOFILAMENT 3-0 RB 1 6IN STRATAFIX SPRL SXMP2B402

## (undated) DEVICE — STRIP,CLOSURE,WOUND,MEDI-STRIP,1/2X4: Brand: MEDLINE

## (undated) DEVICE — SUTURE V-LOC 180 SZ 3-0 L12IN ABSRB GRN V-20 L26MM 1/2 CIR VLOCL0614

## (undated) DEVICE — SUTURE V-LOC 180 SZ 0 L9IN ABSRB GRN GS-21 L37MM 1/2 CIR VLOCL0346

## (undated) DEVICE — PACK PROCEDURE SURG CYSTO SVMMC LF

## (undated) DEVICE — CANNULA SEAL